# Patient Record
Sex: FEMALE | Race: WHITE | NOT HISPANIC OR LATINO | Employment: OTHER | URBAN - METROPOLITAN AREA
[De-identification: names, ages, dates, MRNs, and addresses within clinical notes are randomized per-mention and may not be internally consistent; named-entity substitution may affect disease eponyms.]

---

## 2017-03-17 ENCOUNTER — CONSULT (OUTPATIENT)
Dept: SLEEP MEDICINE | Facility: HOSPITAL | Age: 57
End: 2017-03-17

## 2017-03-17 VITALS
SYSTOLIC BLOOD PRESSURE: 148 MMHG | HEART RATE: 79 BPM | WEIGHT: 260 LBS | OXYGEN SATURATION: 94 % | DIASTOLIC BLOOD PRESSURE: 78 MMHG | BODY MASS INDEX: 46.07 KG/M2 | HEIGHT: 63 IN

## 2017-03-17 DIAGNOSIS — G47.33 OBSTRUCTIVE SLEEP APNEA, ADULT: ICD-10-CM

## 2017-03-17 DIAGNOSIS — R06.83 SNORING: Primary | ICD-10-CM

## 2017-03-17 DIAGNOSIS — G25.81 RESTLESS LEGS SYNDROME (RLS): ICD-10-CM

## 2017-03-17 PROBLEM — E66.01 MORBID OBESITY DUE TO EXCESS CALORIES: Status: ACTIVE | Noted: 2017-03-17

## 2017-03-17 PROCEDURE — 99244 OFF/OP CNSLTJ NEW/EST MOD 40: CPT | Performed by: INTERNAL MEDICINE

## 2017-03-17 RX ORDER — LEVOCETIRIZINE DIHYDROCHLORIDE 5 MG/1
5 TABLET, FILM COATED ORAL AS NEEDED
COMMUNITY
Start: 2017-02-15

## 2017-03-17 RX ORDER — METOPROLOL SUCCINATE 50 MG/1
50 TABLET, EXTENDED RELEASE ORAL DAILY
COMMUNITY
Start: 2017-02-15

## 2017-03-17 RX ORDER — SULFAMETHOXAZOLE AND TRIMETHOPRIM 800; 160 MG/1; MG/1
TABLET ORAL
Refills: 0 | COMMUNITY
Start: 2017-02-23 | End: 2017-04-25

## 2017-03-17 RX ORDER — GABAPENTIN 100 MG/1
CAPSULE ORAL
Refills: 0 | COMMUNITY
Start: 2016-12-31 | End: 2017-04-25 | Stop reason: DRUGHIGH

## 2017-03-17 RX ORDER — GABAPENTIN 300 MG/1
CAPSULE ORAL
Refills: 0 | COMMUNITY
Start: 2017-01-17 | End: 2017-04-25 | Stop reason: SINTOL

## 2017-03-17 RX ORDER — CELECOXIB 200 MG/1
CAPSULE ORAL
Refills: 0 | COMMUNITY
Start: 2017-02-16 | End: 2019-01-31

## 2017-03-17 RX ORDER — FLUOXETINE HYDROCHLORIDE 20 MG/1
CAPSULE ORAL
COMMUNITY
Start: 2017-02-15 | End: 2017-04-25 | Stop reason: DRUGHIGH

## 2017-03-17 RX ORDER — ACYCLOVIR 800 MG/1
TABLET ORAL
Refills: 0 | COMMUNITY
Start: 2017-02-08 | End: 2017-04-25

## 2017-03-17 RX ORDER — PROMETHAZINE HYDROCHLORIDE 12.5 MG/1
TABLET ORAL
Refills: 0 | Status: ON HOLD | COMMUNITY
Start: 2017-02-16 | End: 2020-02-19

## 2017-03-17 NOTE — PATIENT INSTRUCTIONS
Restless Legs Syndrome  Restless legs syndrome is a condition that causes uncomfortable feelings or sensations in the legs, especially while sitting or lying down. The sensations usually cause an overwhelming urge to move the legs. The arms can also sometimes be affected.  The condition can range from mild to severe. The symptoms often interfere with a person's ability to sleep.  CAUSES  The cause of this condition is not known.  RISK FACTORS  This condition is more likely to develop in:  · People who are older than age 50.  · Pregnant women. In general, restless legs syndrome is more common in women than in men.  · People who have a family history of the condition.  · People who have certain medical conditions, such as iron deficiency, kidney disease, Parkinson disease, or nerve damage.  · People who take certain medicines, such as medicines for high blood pressure, nausea, colds, allergies, depression, and some heart conditions.  SYMPTOMS  The main symptom of this condition is uncomfortable sensations in the legs. These sensations may be:  · Described as pulling, tingling, prickling, throbbing, crawling, or burning.  · Worse while you are sitting or lying down.  · Worse during periods of rest or inactivity.  · Worse at night, often interfering with your sleep.  · Accompanied by a very strong urge to move your legs.  · Temporarily relieved by movement of your legs.  The sensations usually affect both sides of the body. The arms can also be affected, but this is rare. People who have this condition often have tiredness during the day because of their lack of sleep at night.  DIAGNOSIS  This condition may be diagnosed based on your description of the symptoms. You may also have tests, including blood tests, to check for other conditions that may lead to your symptoms. In some cases, you may be asked to spend some time in a sleep lab so your sleeping can be monitored.  TREATMENT  Treatment for this condition is  focused on managing the symptoms. Treatment may include:  · Self-help and lifestyle changes.  · Medicines.  HOME CARE INSTRUCTIONS  · Take medicines only as directed by your health care provider.  · Try these methods to get temporary relief from the uncomfortable sensations:    Massage your legs.    Walk or stretch.    Take a cold or hot bath.  · Practice good sleep habits. For example, go to bed and get up at the same time every day.  · Exercise regularly.  · Practice ways of relaxing, such as yoga or meditation.  · Avoid caffeine and alcohol.  · Do not use any tobacco products, including cigarettes, chewing tobacco, or electronic cigarettes. If you need help quitting, ask your health care provider.  · Keep all follow-up visits as directed by your health care provider. This is important.  SEEK MEDICAL CARE IF:  Your symptoms do not improve with treatment, or they get worse.     This information is not intended to replace advice given to you by your health care provider. Make sure you discuss any questions you have with your health care provider.     Document Released: 12/08/2003 Document Revised: 05/03/2016 Document Reviewed: 12/14/2015  [employer] Interactive Patient Education ©2016 [employer]  Sleep Apnea   Sleep apnea is a sleep disorder characterized by abnormal pauses in breathing while you sleep. When your breathing pauses, the level of oxygen in your blood decreases. This causes you to move out of deep sleep and into light sleep. As a result, your quality of sleep is poor, and the system that carries your blood throughout your body (cardiovascular system) experiences stress. If sleep apnea remains untreated, the following conditions can develop:  · High blood pressure (hypertension).  · Coronary artery disease.  · Inability to achieve or maintain an erection (impotence).  · Impairment of your thought process (cognitive dysfunction).  There are three types of sleep apnea:  1. Obstructive sleep apnea--Pauses  in breathing during sleep because of a blocked airway.  2. Central sleep apnea--Pauses in breathing during sleep because the area of the brain that controls your breathing does not send the correct signals to the muscles that control breathing.  3. Mixed sleep apnea--A combination of both obstructive and central sleep apnea.  RISK FACTORS  The following risk factors can increase your risk of developing sleep apnea:  · Being overweight.  · Smoking.  · Having narrow passages in your nose and throat.  · Being of older age.  · Being male.  · Alcohol use.  · Sedative and tranquilizer use.  · Ethnicity. Among individuals younger than 35 years,  Americans are at increased risk of sleep apnea.  SYMPTOMS   · Difficulty staying asleep.  · Daytime sleepiness and fatigue.  · Loss of energy.  · Irritability.  · Loud, heavy snoring.  · Morning headaches.  · Trouble concentrating.  · Forgetfulness.  · Decreased interest in sex.  · Unexplained sleepiness.  DIAGNOSIS   In order to diagnose sleep apnea, your caregiver will perform a physical examination. A sleep study done in the comfort of your own home may be appropriate if you are otherwise healthy. Your caregiver may also recommend that you spend the night in a sleep lab. In the sleep lab, several monitors record information about your heart, lungs, and brain while you sleep. Your leg and arm movements and blood oxygen level are also recorded.  TREATMENT  The following actions may help to resolve mild sleep apnea:  · Sleeping on your side.    · Using a decongestant if you have nasal congestion.    · Avoiding the use of depressants, including alcohol, sedatives, and narcotics.    · Losing weight and modifying your diet if you are overweight.  There also are devices and treatments to help open your airway:  · Oral appliances. These are custom-made mouthpieces that shift your lower jaw forward and slightly open your bite. This opens your airway.  · Devices that create  "positive airway pressure. This positive pressure \"splints\" your airway open to help you breathe better during sleep. The following devices create positive airway pressure:    Continuous positive airway pressure (CPAP) device. The CPAP device creates a continuous level of air pressure with an air pump. The air is delivered to your airway through a mask while you sleep. This continuous pressure keeps your airway open.    Nasal expiratory positive airway pressure (EPAP) device. The EPAP device creates positive air pressure as you exhale. The device consists of single-use valves, which are inserted into each nostril and held in place by adhesive. The valves create very little resistance when you inhale but create much more resistance when you exhale. That increased resistance creates the positive airway pressure. This positive pressure while you exhale keeps your airway open, making it easier to breath when you inhale again.    Bilevel positive airway pressure (BPAP) device. The BPAP device is used mainly in patients with central sleep apnea. This device is similar to the CPAP device because it also uses an air pump to deliver continuous air pressure through a mask. However, with the BPAP machine, the pressure is set at two different levels. The pressure when you exhale is lower than the pressure when you inhale.  · Surgery. Typically, surgery is only done if you cannot comply with less invasive treatments or if the less invasive treatments do not improve your condition. Surgery involves removing excess tissue in your airway to create a wider passage way.     This information is not intended to replace advice given to you by your health care provider. Make sure you discuss any questions you have with your health care provider.     Document Released: 12/08/2003 Document Revised: 01/08/2016 Document Reviewed: 09/26/2016  [employer] Interactive Patient Education ©2016 [employer]    "

## 2017-03-18 NOTE — PROGRESS NOTES
Subjective   Jeevan Cardoso is a 56 y.o. female is being seen for consultation today at the request of SHARATH Sampsonhe is referred for snoring and nonrestorative sleep.    History of Present Illness  Patient states she had surgery last year and the anesthesiologist remarked that she needed to have a sleep study.  She's been told she had snoring.  All of her life but it's been worse in the past 5 years.  Apparently her  moved to sleep in a different room a urine a half ago due to her snoring as well as her movements.  Her  is noted her to have apneas for at least 2 years.  She admits to occasional falling asleep if quiet during the day.  She denies any problems while driving.  She does state she has loud snoring snores in all positions is awakened with a dry mouth and awakens with headaches 2-3 days a week.  She does not feel rested in the morning.  She denies ever breaking her nose.    She has history of reflux symptoms and is on medications.  She's had occasional hypnagogic hallucinations for roughly year.  She denies any sleep paralysis.  She denies any cataplexy.  She has a lot of leg movements at night and is been diagnosed with restless leg syndrome.she is already on medications for this and says it seems to help quite a bit.  She has some pain from her wrist that bothers her at night.  She admits she's gained 15 pounds in the past year.    She goes to bed between 9 and 11 PM.  She says it takes her an hour to go to sleep.  She will wakens couple times during the night.  She thinks she gets 4-5 hours of sleep and often feels tired on arising.  She has a history of hypertension for 4 years.  She denies any history of diabetes or coronary artery disease.  She has a history of arthritis gastroesophageal reflux disease and vertigo and migraines.    Past medical history is: Surgeries: She's had wrist surgeries ×2.  She had cholecystectomy.  She had wisdom teeth extraction she's had  "laparoscopy.    Allergies:She says she is allergic to most pain medicines as well as penicillin also allergic to tree pollen    Habits: Smoking: Without  Ethanol: She has one drink per week    Caffeine: She has 2 cups coffee per day    Family history: Positive for hypertension stroke COPD and cancer  The following portions of the patient's history were reviewed and updated as appropriate: allergies, current medications, past family history, past medical history, past social history, past surgical history and problem list.    Review of Systems   Constitutional: Positive for fatigue.   HENT: Positive for hearing loss, nosebleeds, postnasal drip, sinus pressure and tinnitus.    Eyes: Negative.    Respiratory: Negative.    Cardiovascular: Positive for palpitations and leg swelling.   Gastrointestinal: Positive for diarrhea.        She complains of difficulty swallowing   Genitourinary: Positive for frequency.   Musculoskeletal: Positive for arthralgias, back pain, gait problem and joint swelling.   Skin: Positive for rash.   Allergic/Immunologic: Positive for environmental allergies.   Neurological: Positive for dizziness, tremors, light-headedness, numbness and headaches.   Hematological: Negative.    Psychiatric/Behavioral: Positive for confusion, decreased concentration and dysphoric mood. The patient is nervous/anxious.    [place] scores 9/24    Objective    Visit Vitals   • /78   • Pulse 79   • Ht 63\" (160 cm)   • Wt 260 lb (118 kg)   • LMP  (LMP Unknown)   • SpO2 94%   • BMI 46.06 kg/m2       Physical Exam   Constitutional: She is oriented to person, place, and time. She appears well-developed and well-nourished.   She is morbidly obese.   HENT:   Head: Normocephalic and atraumatic.   She has nasal airway narrowing and Mallampati class IV anatomy   Eyes: EOM are normal. Pupils are equal, round, and reactive to light.   Neck: Normal range of motion. Neck supple.   Cardiovascular: Normal rate, regular rhythm " and normal heart sounds.    Pulmonary/Chest: Effort normal and breath sounds normal.   Abdominal: Soft. Bowel sounds are normal.   Musculoskeletal: Normal range of motion. She exhibits no edema.   Neurological: She is alert and oriented to person, place, and time.   Skin: Skin is warm and dry.   Psychiatric: She has a normal mood and affect. Her behavior is normal.         Assessment/Plan   Jeevan was seen today for sleeping problem.    Diagnoses and all orders for this visit:    Snoring  -     Home Sleep Study; Future    Restless legs syndrome (RLS)    Obstructive sleep apnea, adult  -     Home Sleep Study; Future    patient presents with a history as noted above.  She has a history of restless leg syndrome and is under therapy and seems have fairly good control.  She has a history of snoring and nonrestorative sleep and seems have obstructive sleep apnea.  We will plan to proceed to home sleep testing.  I've discussed possible therapies including CPAP weight loss oral appliances and surgery.  She is to return in roughly 2 weeks after the study.  I've also discussed possible complications from long-term untreated obstructive sleep apnea.    William Herron MD [facility]  Sleep Medicine  Pulmonary and Critical Care Medicine

## 2017-04-10 ENCOUNTER — PROCEDURE VISIT (OUTPATIENT)
Dept: NEUROLOGY | Facility: CLINIC | Age: 57
End: 2017-04-10

## 2017-04-10 DIAGNOSIS — G43.709 CHRONIC MIGRAINE W/O AURA W/O STATUS MIGRAINOSUS, NOT INTRACTABLE: Primary | ICD-10-CM

## 2017-04-10 PROCEDURE — 64615 CHEMODENERV MUSC MIGRAINE: CPT | Performed by: PSYCHIATRY & NEUROLOGY

## 2017-04-10 NOTE — PROGRESS NOTES
Botulinum Toxin Injection Procedure    Pre-operative diagnosis: headache    Post-operative diagnosis: Same    Procedure: Chemical neurolysis    After risks and benefits were explained including bleeding, infection, worsening of pain, damage to the areas being injected, weakness of muscles, loss of muscle control, dysphagia if injecting the head or neck, facial droop if injecting the facial area, painful injection, allergic or other reaction to the medications being injected, and the failure of the procedure to help the problem, a signed consent was obtained.      The patient was placed in a comfortable area and the sites to be treated were identified. A time out was called and performed. The area to be treated was prepped three times with alcohol and the alcohol allowed to dry. Next, a 27 gauge needle was used to inject the medication in the area to be treated.    Area(s) injected: frontalis muscle, semispinalis capitis muscle and temporallis muscle    Medications used: botulinum toxin 200 mu, 2 mL of saline used to dilute the botulinum toxin.      The patient did tolerate the procedure well. There were no complications.       Physical Examination    Current Treatment (Units)   Occipitalis 80 units on the right    Temporalis 75 units on the right   EMG Guidance none .   Complications: none .   The total amount injected in units is 155 .   The total amount wasted in units is 45 .   The total amount submitted in units is 200 .   Botox was supplied by physician.

## 2017-04-21 ENCOUNTER — TRANSCRIBE ORDERS (OUTPATIENT)
Dept: ADMINISTRATIVE | Facility: HOSPITAL | Age: 57
End: 2017-04-21

## 2017-04-21 DIAGNOSIS — R41.3 MEMORY LOSS: Primary | ICD-10-CM

## 2017-04-24 ENCOUNTER — HOSPITAL ENCOUNTER (OUTPATIENT)
Dept: SLEEP MEDICINE | Facility: HOSPITAL | Age: 57
Discharge: HOME | End: 2017-04-24
Attending: INTERNAL MEDICINE | Admitting: INTERNAL MEDICINE

## 2017-04-24 VITALS
HEART RATE: 66 BPM | OXYGEN SATURATION: 96 % | SYSTOLIC BLOOD PRESSURE: 96 MMHG | BODY MASS INDEX: 46.74 KG/M2 | WEIGHT: 263.8 LBS | HEIGHT: 63 IN | DIASTOLIC BLOOD PRESSURE: 51 MMHG

## 2017-04-24 DIAGNOSIS — R06.83 SNORING: ICD-10-CM

## 2017-04-24 DIAGNOSIS — G47.33 OBSTRUCTIVE SLEEP APNEA, ADULT: ICD-10-CM

## 2017-04-24 PROCEDURE — G0398 HOME SLEEP TEST/TYPE 2 PORTA: HCPCS

## 2017-04-24 PROCEDURE — 95806 SLEEP STUDY UNATT&RESP EFFT: CPT | Performed by: INTERNAL MEDICINE

## 2017-04-25 DIAGNOSIS — I10 ESSENTIAL HYPERTENSION: ICD-10-CM

## 2017-04-25 DIAGNOSIS — G25.81 RESTLESS LEGS SYNDROME (RLS): ICD-10-CM

## 2017-04-25 DIAGNOSIS — G47.33 OBSTRUCTIVE SLEEP APNEA, ADULT: Primary | ICD-10-CM

## 2017-04-25 DIAGNOSIS — R06.83 SNORING: ICD-10-CM

## 2017-04-27 ENCOUNTER — HOSPITAL ENCOUNTER (OUTPATIENT)
Dept: CT IMAGING | Facility: HOSPITAL | Age: 57
Discharge: HOME | End: 2017-04-27
Attending: FAMILY MEDICINE | Admitting: FAMILY MEDICINE

## 2017-04-27 DIAGNOSIS — R41.3 MEMORY LOSS: ICD-10-CM

## 2017-04-27 PROCEDURE — 70450 CT HEAD/BRAIN W/O DYE: CPT

## 2017-04-28 NOTE — PROGRESS NOTES
04- Patient agrees to start treatment. Filled of the DME preference sheet and has a preference of [facility]. Sending information over.

## 2017-07-14 ENCOUNTER — PROCEDURE VISIT (OUTPATIENT)
Dept: NEUROLOGY | Facility: CLINIC | Age: 57
End: 2017-07-14

## 2017-07-14 DIAGNOSIS — G43.709 CHRONIC MIGRAINE W/O AURA W/O STATUS MIGRAINOSUS, NOT INTRACTABLE: Primary | ICD-10-CM

## 2017-07-14 PROCEDURE — 64615 CHEMODENERV MUSC MIGRAINE: CPT | Performed by: PSYCHIATRY & NEUROLOGY

## 2017-07-14 NOTE — PROGRESS NOTES
Botulinum Toxin Injection Procedure    History:  Response to treatment has reduced HA's at least 7 fewer days a month and/or 100 hours fewer each month.     Pre-operative diagnosis: headache    Post-operative diagnosis: Same    Procedure: Chemical neurolysis    After risks and benefits were explained including bleeding, infection, worsening of pain, damage to the areas being injected, weakness of muscles, loss of muscle control, dysphagia if injecting the head or neck, facial droop if injecting the facial area, painful injection, allergic or other reaction to the medications being injected, and the failure of the procedure to help the problem, a signed consent was obtained.      The patient was placed in a comfortable area and the sites to be treated were identified. A time out was called and performed. The area to be treated was prepped three times with alcohol and the alcohol allowed to dry. Next, a 27 gauge needle was used to inject the medication in the area to be treated.    Area(s) injected: frontalis muscle, semispinalis capitis muscle and temporallis muscle    Medications used: botulinum toxin 200 mu, 2 mL of saline used to dilute the botulinum toxin.      The patient did tolerate the procedure well. There were no complications.       Physical Examination    Current Treatment (Units)   Splenius Capitis 25 units on the right and 25 units on the left.   Temporalis 25 units on the right and 25 units on the left.   Frontalis 27 units on the right and 28 units on the left.   EMG Guidance none .   Complications: none .   The total amount injected in units is 155 .   The total amount wasted in units is 45 .   The total amount submitted in units is 200 .   Botox was supplied by physician.

## 2017-07-28 ENCOUNTER — OFFICE VISIT (OUTPATIENT)
Dept: SLEEP MEDICINE | Facility: HOSPITAL | Age: 57
End: 2017-07-28

## 2017-07-28 VITALS
BODY MASS INDEX: 47.66 KG/M2 | SYSTOLIC BLOOD PRESSURE: 126 MMHG | OXYGEN SATURATION: 93 % | HEART RATE: 80 BPM | DIASTOLIC BLOOD PRESSURE: 68 MMHG | HEIGHT: 63 IN | WEIGHT: 269 LBS

## 2017-07-28 DIAGNOSIS — G47.34 NOCTURNAL HYPOXEMIA: ICD-10-CM

## 2017-07-28 DIAGNOSIS — G47.33 OBSTRUCTIVE SLEEP APNEA, ADULT: Primary | ICD-10-CM

## 2017-07-28 PROCEDURE — 99214 OFFICE O/P EST MOD 30 MIN: CPT | Performed by: INTERNAL MEDICINE

## 2017-07-28 RX ORDER — FUROSEMIDE 20 MG/1
20 TABLET ORAL AS NEEDED
Refills: 0 | COMMUNITY
Start: 2017-07-06

## 2017-07-28 NOTE — PROGRESS NOTES
Subjective   Jeevan Cardoso is a 56 y.o. female is here today for follow-up.  She is followed here with snoring and nonrestorative sleep.  Her primary care physician is Dr. Hair Delcid.    History of Present Illness  Patient was seen here March 17 with a history of snoring and nonrestorative sleep as well as hypertension arthritis and morbid obesity.  She had home sleep testing on April 24.  She thought her study night was somewhat better than usual.  She is been on CPAP since.  She says she has occasional problem with the mask and takes her some time to get comfortable.  She had some problems with allergies with her old cushion and now is using a new one doing better.  She feels more rested when she uses her machine.  Past Medical History:   Diagnosis Date   • Arthritis    • Erosive (osteo)arthritis    • GERD (gastroesophageal reflux disease)    • Migraine    • Vertigo        Past Surgical History:   Procedure Laterality Date   • GALLBLADDER SURGERY     • WISDOM TOOTH EXTRACTION             Current Outpatient Prescriptions:   •  ALPRAZolam (XANAX) 0.5 MG tablet, Take  by mouth., Disp: , Rfl:   •  buPROPion XL (WELLBUTRIN XL) 300 MG 24 hr tablet, Take  by mouth daily., Disp: , Rfl:   •  celecoxib (CeleBREX) 200 MG capsule, take 1 capsule by mouth once daily, Disp: , Rfl: 0  •  eletriptan (RELPAX) 40 MG tablet, Take 40 mg by mouth. may repeat in 2 hours if necessary, Disp: , Rfl:   •  FLUoxetine (PROZAC) 40 MG capsule, Take  by mouth daily., Disp: , Rfl:   •  furosemide (LASIX) 20 MG tablet, take 1 tablet by mouth once daily if needed, Disp: , Rfl: 0  •  levocetirizine (XYZAL) 5 MG tablet, , Disp: , Rfl:   •  metoprolol succinate XL (TOPROL-XL) 50 MG 24 hr tablet, , Disp: , Rfl:   •  OnabotulinumtoxinA (BOTOX) 200 UNITS reconstituted solution, Inject 200 Units into the shoulder, thigh, or buttocks 1 (one) time., Disp: , Rfl:   •  pantoprazole (PROTONIX) 40 MG EC tablet, Take  by mouth daily., Disp: , Rfl:   •   "promethazine (PHENERGAN) 25 MG tablet, take 1 tablet by mouth every 4 to 6 hours if needed, Disp: , Rfl: 0  •  tiZANidine (ZANAFLEX) 4 MG tablet, Take 3 tablets by mouth daily., Disp: , Rfl:   •  traMADol (ULTRAM) 50 MG tablet, Take 50 mg by mouth., Disp: , Rfl:     Current Facility-Administered Medications:   •  OnabotulinumtoxinA 155 Units, 155 Units, Intramuscular, Q3 Months, Carl Conway MD, 155 Units at 07/14/17 1334    Allergies   Allergen Reactions   • Penicillins Anaphylaxis   • Cefaclor Itching   • Hydrocodone Itching   • Oxycodone-Acetaminophen Itching   • Vancomycin Itching       The following portions of the patient's history were reviewed and updated as appropriate: allergies, current medications and problem list.    Review of Systems   Constitutional: Negative.  Negative for fever.   HENT: Positive for congestion, postnasal drip, rhinorrhea and sinus pressure. Negative for ear pain and sore throat.    Eyes: Negative.    Respiratory: Positive for cough and shortness of breath. Negative for wheezing.    Cardiovascular: Positive for leg swelling. Negative for chest pain.   Gastrointestinal: Positive for abdominal distention. Negative for abdominal pain and vomiting.   Endocrine: Negative.    Genitourinary: Negative.    Musculoskeletal: Positive for arthralgias and joint swelling. Negative for neck pain.   Skin: Negative.  Negative for rash.   Allergic/Immunologic: Positive for environmental allergies.   Neurological: Positive for headaches.   Hematological: Negative.    Psychiatric/Behavioral: Positive for dysphoric mood. The patient is nervous/anxious.    [place] score 7/24    Objective     /68  Pulse 80  Ht 63\" (160 cm)  Wt 269 lb (122 kg)  LMP  (LMP Unknown)  SpO2 93%  BMI 47.65 kg/m2    Physical Exam   Constitutional: She is oriented to person, place, and time. She appears well-developed and well-nourished.   She has morbid obesity.   HENT:   Head: Normocephalic and atraumatic.   She " has nasal airway narrowing with nasal septal deviation the left and Mallampati class IV anatomy   Eyes: EOM are normal. Pupils are equal, round, and reactive to light.   Neck: Normal range of motion. Neck supple.   Cardiovascular: Normal rate, regular rhythm and normal heart sounds.    Pulmonary/Chest: Effort normal and breath sounds normal.   Abdominal: Soft. Bowel sounds are normal.   Musculoskeletal: Normal range of motion. She exhibits edema.   She has 1+ pedal edema   Neurological: She is alert and oriented to person, place, and time.   Skin: Skin is warm and dry.   Psychiatric: She has a normal mood and affect. Her behavior is normal.    she had home sleep testing on April 24 that showed severe obstructive sleep apnea with an AHI of 109.4 she had oxygen desaturations to 79% and spent 8.2% of her time in bed in desaturated state.    Download from her machine for the past 80 days shows usage 95% the time.  She is using it for hours 45 minutes per day.  Her 90% pressure is 13.8.  Her AHI is minimally elevated at 5.3.      Assessment/Plan   Jeevan was seen today for follow-up.    Diagnoses and all orders for this visit:    Obstructive sleep apnea, adult  -     CPAP Therapy  -     Overnight Sleep Oximetry Study; Future    Nocturnal hypoxemia  -     Overnight Sleep Oximetry Study; Future    Patient does have very severe obstructive sleep apnea with significant nocturnal hypoxemia.  She has fairly good control of her respiratory events on her current pressure settings.  She wishes to continue on the CPAP.  We've discussed other possible therapies including weight loss oral appliances and surgery.  We will write to renew her supplies.  We will check an overnight oximetry on the CPAP to be certain we are correcting nocturnal hypoxemia.  Assuming it is adequate we will plan to see her back in 1 year.  She is encouraged to lose weight.  She is encouraged to avoid alcohol and sedatives close to bedtime.  She is encouraged  to act us lateral position sleep.  She is to contact us earlier symptoms worsen.             William Herron MD [facility]  Sleep Medicine  Pulmonary and Critical Care Medicine      07/28/17  2:19 PM

## 2017-07-28 NOTE — PATIENT INSTRUCTIONS
Sleep Apnea  Sleep apnea is a condition in which breathing pauses or becomes shallow during sleep. Episodes of sleep apnea usually last 10 seconds or longer, and they may occur as many as 20 times an hour. Sleep apnea disrupts your sleep and keeps your body from getting the rest that it needs. This condition can increase your risk of certain health problems, including:  · Heart attack.  · Stroke.  · Obesity.  · Diabetes.  · Heart failure.  · Irregular heartbeat.  There are three kinds of sleep apnea:  · Obstructive sleep apnea. This kind is caused by a blocked or collapsed airway.  · Central sleep apnea. This kind happens when the part of the brain that controls breathing does not send the correct signals to the muscles that control breathing.  · Mixed sleep apnea. This is a combination of obstructive and central sleep apnea.  CAUSES  The most common cause of this condition is a collapsed or blocked airway. An airway can collapse or become blocked if:  · Your throat muscles are abnormally relaxed.  · Your tongue and tonsils are larger than normal.  · You are overweight.  · Your airway is smaller than normal.  RISK FACTORS  This condition is more likely to develop in people who:  · Are overweight.  · Smoke.  · Have a smaller than normal airway.  · Are elderly.  · Are male.  · Drink alcohol.  · Take sedatives or tranquilizers.  · Have a family history of sleep apnea.  SYMPTOMS  Symptoms of this condition include:  · Trouble staying asleep.  · Daytime sleepiness and tiredness.  · Irritability.  · Loud snoring.  · Morning headaches.  · Trouble concentrating.  · Forgetfulness.  · Decreased interest in sex.  · Unexplained sleepiness.  · Mood swings.  · Personality changes.  · Feelings of depression.  · Waking up often during the night to urinate.  · Dry mouth.  · Sore throat.  DIAGNOSIS  This condition may be diagnosed with:  · A medical history.  · A physical exam.  · A series of tests that are done while you are  sleeping (sleep study). These tests are usually done in a sleep lab, but they may also be done at home.  TREATMENT  Treatment for this condition aims to restore normal breathing and to ease symptoms during sleep. It may involve managing health issues that can affect breathing, such as high blood pressure or obesity. Treatment may include:  · Sleeping on your side.  · Using a decongestant if you have nasal congestion.  · Avoiding the use of depressants, including alcohol, sedatives, and narcotics.  · Losing weight if you are overweight.  · Making changes to your diet.  · Quitting smoking.  · Using a device to open your airway while you sleep, such as:    An oral appliance. This is a custom-made mouthpiece that shifts your lower jaw forward.    A continuous positive airway pressure (CPAP) device. This device delivers oxygen to your airway through a mask.    A nasal expiratory positive airway pressure (EPAP) device. This device has valves that you put into each nostril.    A bi-level positive airway pressure (BPAP) device. This device delivers oxygen to your airway through a mask.  · Surgery if other treatments do not work. During surgery, excess tissue is removed to create a wider airway.  It is important to get treatment for sleep apnea. Without treatment, this condition can lead to:  · High blood pressure.  · Coronary artery disease.  · (Men) An inability to achieve or maintain an erection (impotence).  · Reduced thinking abilities.  HOME CARE INSTRUCTIONS  · Make any lifestyle changes that your health care provider recommends.  · Eat a healthy, well-balanced diet.  · Take over-the-counter and prescription medicines only as told by your health care provider.  · Avoid using depressants, including alcohol, sedatives, and narcotics.  · Take steps to lose weight if you are overweight.  · If you were given a device to open your airway while you sleep, use it only as told by your health care provider.  · Do not use any  tobacco products, such as cigarettes, chewing tobacco, and e-cigarettes. If you need help quitting, ask your health care provider.  · Keep all follow-up visits as told by your health care provider. This is important.  SEEK MEDICAL CARE IF:  · The device that you received to open your airway during sleep is uncomfortable or does not seem to be working.  · Your symptoms do not improve.  · Your symptoms get worse.  SEEK IMMEDIATE MEDICAL CARE IF:  · You develop chest pain.  · You develop shortness of breath.  · You develop discomfort in your back, arms, or stomach.  · You have trouble speaking.  · You have weakness on one side of your body.  · You have drooping in your face.  These symptoms may represent a serious problem that is an emergency. Do not wait to see if the symptoms will go away. Get medical help right away. Call your local emergency services (911 in the [place] Do not drive yourself to the hospital.     This information is not intended to replace advice given to you by your health care provider. Make sure you discuss any questions you have with your health care provider.     Document Released: 12/08/2003 Document Revised: 04/10/2017 Document Reviewed: 09/26/2016  [employer] Interactive Patient Education ©2017 [employer]

## 2017-09-08 ENCOUNTER — TRANSCRIBE ORDERS (OUTPATIENT)
Dept: ADMINISTRATIVE | Facility: HOSPITAL | Age: 57
End: 2017-09-08

## 2017-09-08 DIAGNOSIS — R60.0 LOWER LEG EDEMA: Primary | ICD-10-CM

## 2019-01-14 ENCOUNTER — LAB (OUTPATIENT)
Dept: LAB | Facility: HOSPITAL | Age: 59
End: 2019-01-14

## 2019-01-14 ENCOUNTER — CONSULT (OUTPATIENT)
Dept: ONCOLOGY | Facility: CLINIC | Age: 59
End: 2019-01-14

## 2019-01-14 VITALS
DIASTOLIC BLOOD PRESSURE: 76 MMHG | HEIGHT: 63 IN | HEART RATE: 88 BPM | SYSTOLIC BLOOD PRESSURE: 145 MMHG | WEIGHT: 248 LBS | RESPIRATION RATE: 18 BRPM | BODY MASS INDEX: 43.94 KG/M2 | TEMPERATURE: 98.2 F | OXYGEN SATURATION: 96 %

## 2019-01-14 DIAGNOSIS — D69.6 THROMBOCYTOPENIA (HCC): Primary | Chronic | ICD-10-CM

## 2019-01-14 DIAGNOSIS — D69.6 THROMBOCYTOPENIA (HCC): ICD-10-CM

## 2019-01-14 LAB
APTT PPP: 32.6 SECONDS (ref 24–37)
D DIMER PPP FEU-MCNC: 0.44 MCGFEU/ML (ref 0–0.56)
FIBRINOGEN PPP-MCNC: 358 MG/DL (ref 220–400)
FSP PPP LA-ACNC: NORMAL
INR PPP: 1.21 (ref 0.85–1.16)
PROTHROMBIN TIME: 14.7 SECONDS (ref 11.2–14.3)

## 2019-01-14 PROCEDURE — 85060 BLOOD SMEAR INTERPRETATION: CPT

## 2019-01-14 PROCEDURE — 85384 FIBRINOGEN ACTIVITY: CPT

## 2019-01-14 PROCEDURE — 86038 ANTINUCLEAR ANTIBODIES: CPT

## 2019-01-14 PROCEDURE — 85379 FIBRIN DEGRADATION QUANT: CPT

## 2019-01-14 PROCEDURE — 85730 THROMBOPLASTIN TIME PARTIAL: CPT

## 2019-01-14 PROCEDURE — 36415 COLL VENOUS BLD VENIPUNCTURE: CPT

## 2019-01-14 PROCEDURE — 85610 PROTHROMBIN TIME: CPT

## 2019-01-14 PROCEDURE — 86677 HELICOBACTER PYLORI ANTIBODY: CPT

## 2019-01-14 PROCEDURE — 85362 FIBRIN DEGRADATION PRODUCTS: CPT

## 2019-01-14 PROCEDURE — 86430 RHEUMATOID FACTOR TEST QUAL: CPT

## 2019-01-14 PROCEDURE — 99244 OFF/OP CNSLTJ NEW/EST MOD 40: CPT | Performed by: INTERNAL MEDICINE

## 2019-01-14 RX ORDER — VENLAFAXINE 75 MG/1
75 TABLET ORAL 2 TIMES DAILY
COMMUNITY
End: 2019-01-31

## 2019-01-14 RX ORDER — AMPICILLIN TRIHYDRATE 250 MG
400 CAPSULE ORAL DAILY
COMMUNITY

## 2019-01-14 RX ORDER — MELOXICAM 7.5 MG/1
7.5 TABLET ORAL DAILY
COMMUNITY
End: 2023-01-23

## 2019-01-14 RX ORDER — B-COMPLEX WITH VITAMIN C
1 TABLET ORAL EVERY MORNING
COMMUNITY
End: 2019-01-31

## 2019-01-14 RX ORDER — VENLAFAXINE HYDROCHLORIDE 37.5 MG/1
37.5 CAPSULE, EXTENDED RELEASE ORAL DAILY
COMMUNITY
End: 2019-01-31

## 2019-01-14 NOTE — PROGRESS NOTES
CHIEF COMPLAINT: Thrombocytopenia    REASON FOR REFERRAL: Same      RECORDS OBTAINED  Records of the patients history including those obtained from  [facility] were reviewed and summarized in detail.    Oncology/Hematology History    1. Thrombocytopenia  2. Severe obstructive sleep apnea  3. Normal creatinine and liver enzymes    -1/14/19: Saw me for the first time with platelet count by history of 76,000 in November 2018 and 83,000 in October 2018 with normal creatinine and liver enzymes.  No petechiae or ecchymoses.  No arthritis to suggest rheumatoid disease.  No rashes.  No diarrhea.  No abdominal distention she's been aware of.  No change in the color caliber consistency of her stools.  No consumption of undercooked beef that she is aware of.  No known ulcers.  I recommended checking a peripheral smear for platelet clumping and schistocytes, d-dimer, fibrin split products, PT, PTT, LINETTE, rheumatoid factor, and liver spleen ultrasound with her severe obstructive sleep apnea with high risk of pulmonary hypertension and possible functional portal hypertension with splenomegaly though I did not feel an enlarged spleen on exam.  Nonetheless she has large abdominal girth that would make that less accurate on exam.  She is had a 36 pound unexplained weight loss over the last 10 months with a great deal of fatigue and to that end for the potential of immune mediated destruction of platelets in the face of lymphoproliferative disorder I will check a CT of her chest abdomen and pelvis as well.  I will also check Helicobacter pylori see urology's and ultimately she may need to go back to Dr. Alvarez who did her colonoscopy 2 years ago and EGD about 7 years ago if there is any hint of ongoing reflux symptoms/helicobacter pylori        Thrombocytopenia (CMS/HCC)    1/14/2019 Initial Diagnosis     Thrombocytopenia (CMS/HCC)            HISTORY OF PRESENT ILLNESS:  The patient is a 58 y.o.  female, referred for thrombocytopenia.   See above for hematology history of present illness.    REVIEW OF SYSTEMS:  A 14 point review of systems was performed and is negative except as noted above.    Past Medical History:   Diagnosis Date   • Arthritis    • Erosive (osteo)arthritis    • GERD (gastroesophageal reflux disease)    • Migraine    • Vertigo      Past Surgical History:   Procedure Laterality Date   • GALLBLADDER SURGERY     • WISDOM TOOTH EXTRACTION         Current Outpatient Medications on File Prior to Visit   Medication Sig Dispense Refill   • ALPRAZolam (XANAX) 0.5 MG tablet Take 0.5 mg by mouth 3 (Three) Times a Day As Needed.     • Coenzyme Q10 (COQ10) 200 MG capsule Take 200 mg by mouth Daily.     • eletriptan (RELPAX) 40 MG tablet Take 40 mg by mouth. may repeat in 2 hours if necessary     • FLUoxetine (PROZAC) 40 MG capsule Take  by mouth daily.     • furosemide (LASIX) 20 MG tablet take 1 tablet by mouth once daily if needed  0   • levocetirizine (XYZAL) 5 MG tablet      • meloxicam (MOBIC) 7.5 MG tablet Take 7.5 mg by mouth Daily.     • metoprolol succinate XL (TOPROL-XL) 50 MG 24 hr tablet      • pantoprazole (PROTONIX) 40 MG EC tablet Take  by mouth daily.     • promethazine (PHENERGAN) 12.5 MG tablet take 1 tablet by mouth every 4 to 6 hours if needed  0   • Riboflavin (VITAMIN B-2) 25 MG tablet Take 1 tablet by mouth Every Morning.     • tiZANidine (ZANAFLEX) 4 MG tablet Take 3 tablets by mouth Every 8 (Eight) Hours As Needed (RLS).     • traMADol (ULTRAM) 50 MG tablet Take 50 mg by mouth Every 8 (Eight) Hours As Needed.     • Unable to find 1 each 1 (One) Time. Membrasm qam     • venlafaxine (EFFEXOR) 75 MG tablet Take 75 mg by mouth 2 (Two) Times a Day.     • Venlafaxine HCl (EFFEXOR XR PO) Take 225 mg by mouth Every Night.     • venlafaxine XR (EFFEXOR XR) 37.5 MG 24 hr capsule Take 37.5 mg by mouth Daily.     • buPROPion XL (WELLBUTRIN XL) 300 MG 24 hr tablet Take  by mouth daily.     • celecoxib (CeleBREX) 200 MG capsule  "take 1 capsule by mouth once daily  0   • OnabotulinumtoxinA (BOTOX) 200 UNITS reconstituted solution Inject 200 Units into the shoulder, thigh, or buttocks 1 (one) time.       Current Facility-Administered Medications on File Prior to Visit   Medication Dose Route Frequency Provider Last Rate Last Dose   • OnabotulinumtoxinA 155 Units  155 Units Intramuscular Q3 Months Carl Conway MD   155 Units at 07/14/17 1334       Allergies   Allergen Reactions   • Penicillins Anaphylaxis   • Cefaclor Itching   • Hydrocodone Itching   • Oxycodone-Acetaminophen Itching   • Vancomycin Itching       Social History     Socioeconomic History   • Marital status:      Spouse name: Not on file   • Number of children: Not on file   • Years of education: Not on file   • Highest education level: Not on file   Tobacco Use   • Smoking status: Never Smoker   • Smokeless tobacco: Never Used   Substance and Sexual Activity   • Alcohol use: Yes     Comment: once a week    • Drug use: No       Family History   Problem Relation Age of Onset   • Breast cancer Cousin 40   • Ovarian cancer Cousin 50   • Hypertension Mother    • Stroke Mother    • Cancer Father        PHYSICAL EXAM:    /76   Pulse 88   Temp 98.2 °F (36.8 °C)   Resp 18   Ht 160 cm (63\")   Wt 112 kg (248 lb)   LMP  (LMP Unknown)   SpO2 96%   BMI 43.93 kg/m²     ECOG: (1) Restricted in physically strenuous activity, ambulatory and able to do work of light nature  General: well appearing overly nourished female in no acute distress  HEENT: sclera anicteric, oropharynx clear  Lymphatics: no cervical, supraclavicular, inguinal, or axillary adenopathy  Cardiovascular: regular rate and rhythm, no murmurs  Neck: Supple; No thyromegaly  Lungs: clear to auscultation bilaterally. No respiratory distress.   Abdomen: soft, nontender, nondistended.  No palpable organomegaly  Extremities: no cyanosis, clubbing, edema, or cords  Skin: no rashes, lesions, bruising, or " petechiae  Neuro: Alert and oriented x 4; Moving all extremities.  Psych: No anxiety or depression    Lab Results   Component Value Date    HGB 12.4 10/07/2015    HCT 38.5 10/07/2015    MCV 98.0 10/07/2015     (L) 10/07/2015    WBC 4.53 10/07/2015    NEUTROABS 2.08 10/07/2015    LYMPHSABS 1.86 10/07/2015    MONOSABS 0.44 10/07/2015    EOSABS 0.14 10/07/2015    BASOSABS 0.01 10/07/2015     Lab Results   Component Value Date    GLUCOSE 114 (H) 10/07/2015    BUN 14 10/07/2015    CREATININE 1.0 10/07/2015     10/07/2015    K 4.3 10/07/2015     10/07/2015    CO2 27 10/07/2015    CALCIUM 8.4 (L) 10/07/2015    PROTEINTOT 6.1 10/07/2015    ALBUMIN 3.3 10/07/2015    BILITOT 0.2 (L) 10/07/2015    ALKPHOS 87 10/07/2015    AST 40 (H) 10/07/2015    ALT 32 10/07/2015           Assessment/Plan     1. Thrombocytopenia  2. Severe obstructive sleep apnea  3. Normal creatinine and liver enzymes    -1/14/19: Saw me for the first time with platelet count by history of 76,000 in November 2018 and 83,000 in October 2018 with normal creatinine and liver enzymes.  No petechiae or ecchymoses.  No arthritis to suggest rheumatoid disease.  No rashes.  No diarrhea.  No abdominal distention she's been aware of.  No change in the color caliber consistency of her stools.  No consumption of undercooked beef that she is aware of.  No known ulcers.  I recommended checking a peripheral smear for platelet clumping and schistocytes, d-dimer, fibrin split products, PT, PTT, LINETTE, rheumatoid factor, and liver spleen ultrasound with her severe obstructive sleep apnea with high risk of pulmonary hypertension and possible functional portal hypertension with splenomegaly though I did not feel an enlarged spleen on exam.  Nonetheless she has large abdominal girth that would make that less accurate on exam.  She is had a 36 pound unexplained weight loss over the last 10 months with a great deal of fatigue and to that end for the potential of  immune mediated destruction of platelets in the face of lymphoproliferative disorder I will check a CT of her chest abdomen and pelvis as well.  I will also check Helicobacter pylori see urology's and ultimately she may need to go back to Dr. Alvarez who did her colonoscopy 2 years ago and EGD about 7 years ago if there is any hint of ongoing reflux symptoms/helicobacter pylori.  Discussed with patient 1 hour greater than 50% spent counseling      Darren Day MD    1/14/2019

## 2019-01-15 LAB
H PYLORI IGA SER IA-ACNC: <9 UNITS (ref 0–8.9)
H PYLORI IGG SER IA-ACNC: 0.19 INDEX VALUE (ref 0–0.79)
H PYLORI IGM SER-ACNC: <9 UNITS (ref 0–8.9)
RHEUMATOID FACT SERPL-ACNC: NEGATIVE [IU]/ML

## 2019-01-16 LAB
ANA HOMOGEN TITR SER: ABNORMAL {TITER}
ANA SER QL IA: POSITIVE
CYTOLOGIST CVX/VAG CYTO: NORMAL
Lab: ABNORMAL
PATH INTERP BLD-IMP: NORMAL

## 2019-01-21 ENCOUNTER — HOSPITAL ENCOUNTER (OUTPATIENT)
Dept: CT IMAGING | Facility: HOSPITAL | Age: 59
Discharge: HOME | End: 2019-01-21
Attending: INTERNAL MEDICINE | Admitting: INTERNAL MEDICINE

## 2019-01-21 ENCOUNTER — TRANSCRIBE ORDERS (OUTPATIENT)
Dept: DIABETES SERVICES | Facility: HOSPITAL | Age: 59
End: 2019-01-21

## 2019-01-21 DIAGNOSIS — D69.6 THROMBOCYTOPENIA (HCC): Chronic | ICD-10-CM

## 2019-01-21 DIAGNOSIS — E11.9 NEW ONSET TYPE 2 DIABETES MELLITUS (HCC): Primary | ICD-10-CM

## 2019-01-21 LAB — CREAT BLDA-MCNC: 0.9 MG/DL (ref 0.6–1.3)

## 2019-01-21 PROCEDURE — 74177 CT ABD & PELVIS W/CONTRAST: CPT

## 2019-01-21 PROCEDURE — 82565 ASSAY OF CREATININE: CPT

## 2019-01-21 PROCEDURE — 71260 CT THORAX DX C+: CPT

## 2019-01-21 PROCEDURE — 25010000002 IOPAMIDOL 61 % SOLUTION: Performed by: INTERNAL MEDICINE

## 2019-01-21 RX ADMIN — BARIUM SULFATE 375 ML: 21 SUSPENSION ORAL at 15:02

## 2019-01-21 RX ADMIN — IOPAMIDOL 90 ML: 612 INJECTION, SOLUTION INTRAVENOUS at 15:00

## 2019-01-31 ENCOUNTER — LAB (OUTPATIENT)
Dept: LAB | Facility: HOSPITAL | Age: 59
End: 2019-01-31

## 2019-01-31 ENCOUNTER — TELEPHONE (OUTPATIENT)
Dept: ONCOLOGY | Facility: CLINIC | Age: 59
End: 2019-01-31

## 2019-01-31 ENCOUNTER — OFFICE VISIT (OUTPATIENT)
Dept: ONCOLOGY | Facility: CLINIC | Age: 59
End: 2019-01-31

## 2019-01-31 VITALS
RESPIRATION RATE: 18 BRPM | BODY MASS INDEX: 43.77 KG/M2 | HEIGHT: 63 IN | OXYGEN SATURATION: 97 % | DIASTOLIC BLOOD PRESSURE: 60 MMHG | TEMPERATURE: 97.8 F | HEART RATE: 65 BPM | WEIGHT: 247 LBS | SYSTOLIC BLOOD PRESSURE: 113 MMHG

## 2019-01-31 DIAGNOSIS — D69.6 THROMBOCYTOPENIA (HCC): Primary | Chronic | ICD-10-CM

## 2019-01-31 DIAGNOSIS — D69.6 THROMBOCYTOPENIA (HCC): ICD-10-CM

## 2019-01-31 LAB
ALBUMIN SERPL-MCNC: 3.37 G/DL (ref 3.2–4.8)
ALBUMIN/GLOB SERPL: 1 G/DL (ref 1.5–2.5)
ALP SERPL-CCNC: 119 U/L (ref 25–100)
ALT SERPL W P-5'-P-CCNC: 35 U/L (ref 7–40)
ANION GAP SERPL CALCULATED.3IONS-SCNC: 6 MMOL/L (ref 3–11)
AST SERPL-CCNC: 51 U/L (ref 0–33)
BILIRUB SERPL-MCNC: 1.2 MG/DL (ref 0.3–1.2)
BUN BLD-MCNC: 10 MG/DL (ref 9–23)
BUN/CREAT SERPL: 9.1 (ref 7–25)
CALCIUM SPEC-SCNC: 8.6 MG/DL (ref 8.7–10.4)
CHLORIDE SERPL-SCNC: 92 MMOL/L (ref 99–109)
CO2 SERPL-SCNC: 30 MMOL/L (ref 20–31)
CREAT BLD-MCNC: 1.1 MG/DL (ref 0.6–1.3)
ERYTHROCYTE [DISTWIDTH] IN BLOOD BY AUTOMATED COUNT: 13.8 % (ref 11.3–14.5)
GFR SERPL CREATININE-BSD FRML MDRD: 51 ML/MIN/1.73
GLOBULIN UR ELPH-MCNC: 3.3 GM/DL
GLUCOSE BLD-MCNC: 559 MG/DL (ref 70–100)
HCT VFR BLD AUTO: 40.5 % (ref 34.5–44)
HGB BLD-MCNC: 13.5 G/DL (ref 11.5–15.5)
LYMPHOCYTES # BLD AUTO: 1.4 10*3/MM3 (ref 0.6–4.8)
LYMPHOCYTES NFR BLD AUTO: 33.8 % (ref 24–44)
MCH RBC QN AUTO: 32.6 PG (ref 27–31)
MCHC RBC AUTO-ENTMCNC: 33.3 G/DL (ref 32–36)
MCV RBC AUTO: 97.9 FL (ref 80–99)
MONOCYTES # BLD AUTO: 0.1 10*3/MM3 (ref 0–1)
MONOCYTES NFR BLD AUTO: 2.9 % (ref 0–12)
NEUTROPHILS # BLD AUTO: 2.7 10*3/MM3 (ref 1.5–8.3)
NEUTROPHILS NFR BLD AUTO: 63.3 % (ref 41–71)
PLATELET # BLD AUTO: 87 10*3/MM3 (ref 150–450)
PMV BLD AUTO: 8.7 FL (ref 6–12)
POTASSIUM BLD-SCNC: 4.5 MMOL/L (ref 3.5–5.5)
PROT SERPL-MCNC: 6.7 G/DL (ref 5.7–8.2)
RBC # BLD AUTO: 4.14 10*6/MM3 (ref 3.89–5.14)
SODIUM BLD-SCNC: 128 MMOL/L (ref 132–146)
TSH SERPL DL<=0.05 MIU/L-ACNC: 8.68 MIU/ML (ref 0.35–5.35)
WBC NRBC COR # BLD: 4.2 10*3/MM3 (ref 3.5–10.8)

## 2019-01-31 PROCEDURE — 84443 ASSAY THYROID STIM HORMONE: CPT

## 2019-01-31 PROCEDURE — 99215 OFFICE O/P EST HI 40 MIN: CPT | Performed by: INTERNAL MEDICINE

## 2019-01-31 PROCEDURE — 85025 COMPLETE CBC W/AUTO DIFF WBC: CPT

## 2019-01-31 PROCEDURE — 82784 ASSAY IGA/IGD/IGG/IGM EACH: CPT

## 2019-01-31 PROCEDURE — 36415 COLL VENOUS BLD VENIPUNCTURE: CPT

## 2019-01-31 PROCEDURE — 80053 COMPREHEN METABOLIC PANEL: CPT

## 2019-01-31 PROCEDURE — 82785 ASSAY OF IGE: CPT

## 2019-01-31 RX ORDER — TIZANIDINE 4 MG/1
4 TABLET ORAL EVERY 6 HOURS PRN
COMMUNITY
Start: 2018-09-03 | End: 2023-01-23

## 2019-01-31 RX ORDER — TRAMADOL HYDROCHLORIDE 50 MG/1
TABLET ORAL
COMMUNITY
End: 2019-07-31

## 2019-01-31 RX ORDER — VENLAFAXINE HYDROCHLORIDE 150 MG/1
CAPSULE, EXTENDED RELEASE ORAL
COMMUNITY
End: 2019-07-31

## 2019-01-31 NOTE — PROGRESS NOTES
CHIEF COMPLAINT: Follow-up thrombocytopenia    Problem List:  Oncology/Hematology History    1. Thrombocytopenia  2. Severe obstructive sleep apnea  3. Normal creatinine and liver enzymes    -1/14/19: Saw me for the first time with platelet count by history of 76,000 in November 2018 and 83,000 in October 2018 with normal creatinine and liver enzymes.  No petechiae or ecchymoses.  No arthritis to suggest rheumatoid disease.  No rashes.  No diarrhea.  No abdominal distention she's been aware of.  No change in the color caliber consistency of her stools.  No consumption of undercooked beef that she is aware of.  No known ulcers.  I recommended checking a peripheral smear for platelet clumping and schistocytes, d-dimer, fibrin split products, PT, PTT, LINETTE, rheumatoid factor, and liver spleen ultrasound with her severe obstructive sleep apnea with high risk of pulmonary hypertension and possible functional portal hypertension with splenomegaly though I did not feel an enlarged spleen on exam.  Nonetheless she has large abdominal girth that would make that less accurate on exam.  She is had a 36 pound unexplained weight loss over the last 10 months with a great deal of fatigue and to that end for the potential of immune mediated destruction of platelets in the face of lymphoproliferative disorder I will check a CT of her chest abdomen and pelvis as well.  I will also check Helicobacter pylori see urology's and ultimately she may need to go back to Dr. Alvarez who did her colonoscopy 2 years ago and EGD about 7 years ago if there is any hint of ongoing reflux symptoms/helicobacter pylori    -1/31/19: Came back to me to go over the results of the above tests.  Liver spleen ultrasound showed portal vein dilated 1.5 cm with 14.7 cm splenomegaly and hepatomegaly with abnormal liver echogenicity indicative of parenchymal disease.  Serum helical back or pylori was negative.  1/21/19 CT chest abdomen pelvis showed 13 cm  splenomegaly with slight increased thickening of the spleen compared to prominent spleen seen in 2013 on prior CT.  Peripheral smear showed decreased platelets with no schistocytes or platelet clumping.  D-dimer, fibrin split products, and fibrinogen were all negative for DIC.  Her INR was 1.2 but with a normal PTT.  Her LINETTE was positive at 1:320 in a homogeneous pattern with rheumatoid factor negative.  Hence, I suspect this to be some component of portal hypertensive sequestration with hypersplenism and hepatomegaly on liver spleen ultrasound with Dr. Jonathan Florez as well as perhaps autoimmune thrombocytopenic purpura with a positive LINETTE and no peripheral stigmata for lupus.  I will make referrals to GI as well as rheumatology to sort out associated autoimmune and hepatic disorders.  I'm checking her liver enzymes along with her CBC and quantitative immunoglobulins as well as a TSH in light of her tendency to  infections as well as her severe fatigue 1/31/19 and will see her back in a couple of months to see what other specialists have come up with.        Thrombocytopenia (CMS/HCC)    1/14/2019 Initial Diagnosis     Thrombocytopenia (CMS/HCC)         1/21/2019 Imaging     CT chest, abdomen and pelvis IMPRESSION:  1. No acute intrathoracic pathology or evidence for active malignancy  within the chest.  2. Spleen is borderline enlarged in craniocaudal dimension at 13 cm with  increased thickness on transaxial measurements especially when compared  to 2013 CT evaluation concerning for splenomegaly with splenule at the  splenic hilum.  3. No bulky adenopathy within the abdomen or pelvis. No ascites.            HISTORY OF PRESENT ILLNESS:  The patient is a 58 y.o. female, here for follow up on management of thrombocytopenia.  she is very fatigued thus far inexplicably.  See above for details of my recent hematologic evaluation.      Past Medical History:   Diagnosis Date   • Arthritis    • Erosive  "(osteo)arthritis    • GERD (gastroesophageal reflux disease)    • Migraine    • Vertigo      Past Surgical History:   Procedure Laterality Date   • GALLBLADDER SURGERY     • WISDOM TOOTH EXTRACTION         Allergies   Allergen Reactions   • Penicillins Anaphylaxis   • Cefaclor Itching   • Hydrocodone Itching   • Oxycodone-Acetaminophen Itching   • Vancomycin Itching       Family History and Social History reviewed and changed as necessary      REVIEW OF SYSTEM:   Review of Systems   Constitutional: Negative for appetite change, chills, diaphoresis, fatigue, fever and unexpected weight change.   HENT:   Negative for mouth sores, sore throat and trouble swallowing.    Eyes: Negative for icterus.   Respiratory: Negative for cough, hemoptysis and shortness of breath.    Cardiovascular: Negative for chest pain, leg swelling and palpitations.   Gastrointestinal: Negative for abdominal distention, abdominal pain, blood in stool, constipation, diarrhea, nausea and vomiting.   Endocrine: Negative for hot flashes.   Genitourinary: Negative for bladder incontinence, difficulty urinating, dysuria, frequency and hematuria.    Musculoskeletal: Negative for gait problem, neck pain and neck stiffness.   Skin: Negative for rash.   Neurological: Negative for dizziness, gait problem, headaches, light-headedness and numbness.   Hematological: Negative for adenopathy. Does not bruise/bleed easily.   Psychiatric/Behavioral: Negative for depression. The patient is not nervous/anxious.    All other systems reviewed and are negative.       PHYSICAL EXAM    Vitals:    01/31/19 1027   BP: 113/60   Pulse: 65   Resp: 18   Temp: 97.8 °F (36.6 °C)   SpO2: 97%   Weight: 112 kg (247 lb)   Height: 160 cm (63\")     Constitutional: Appears well-developed and well-nourished. No distress.   ECOG: (1) Restricted in physically strenuous activity, ambulatory and able to do work of light nature  HENT:   Head: Normocephalic.   Mouth/Throat: Oropharynx is " clear and moist.   Eyes: Conjunctivae are normal. Pupils are equal, round, and reactive to light. No scleral icterus.   Neck: Neck supple. No JVD present. No thyromegaly present.   Cardiovascular: Normal rate, regular rhythm and normal heart sounds.    Pulmonary/Chest: Breath sounds normal. No respiratory distress.   Abdominal: Soft. Exhibits no distension and no mass. There is no hepatosplenomegaly. There is no tenderness. There is no rebound and no guarding.   Musculoskeletal:Exhibits no edema, tenderness or deformity.   Neurological: Alert and oriented to person, place, and time. Exhibits normal muscle tone.   Skin: No ecchymosis, no petechiae and no rash noted. Not diaphoretic. No cyanosis. Nails show no clubbing.   Psychiatric: Normal mood and affect.   Vitals reviewed.      Lab Results   Component Value Date    HGB 12.4 10/07/2015    HCT 38.5 10/07/2015    MCV 98.0 10/07/2015     (L) 10/07/2015    WBC 4.53 10/07/2015    NEUTROABS 2.08 10/07/2015    LYMPHSABS 1.86 10/07/2015    MONOSABS 0.44 10/07/2015    EOSABS 0.14 10/07/2015    BASOSABS 0.01 10/07/2015       Lab Results   Component Value Date    GLUCOSE 114 (H) 10/07/2015    BUN 14 10/07/2015    CREATININE 0.90 01/21/2019     10/07/2015    K 4.3 10/07/2015     10/07/2015    CO2 27 10/07/2015    CALCIUM 8.4 (L) 10/07/2015    PROTEINTOT 6.1 10/07/2015    ALBUMIN 3.3 10/07/2015    BILITOT 0.2 (L) 10/07/2015    ALKPHOS 87 10/07/2015    AST 40 (H) 10/07/2015    ALT 32 10/07/2015                   ASSESSMENT & PLAn  1. Thrombocytopenia  2. Severe obstructive sleep apnea  3. Normal creatinine and liver enzymes    -1/14/19: Saw me for the first time with platelet count by history of 76,000 in November 2018 and 83,000 in October 2018 with normal creatinine and liver enzymes.  No petechiae or ecchymoses.  No arthritis to suggest rheumatoid disease.  No rashes.  No diarrhea.  No abdominal distention she's been aware of.  No change in the color  caliber consistency of her stools.  No consumption of undercooked beef that she is aware of.  No known ulcers.  I recommended checking a peripheral smear for platelet clumping and schistocytes, d-dimer, fibrin split products, PT, PTT, LINETTE, rheumatoid factor, and liver spleen ultrasound with her severe obstructive sleep apnea with high risk of pulmonary hypertension and possible functional portal hypertension with splenomegaly though I did not feel an enlarged spleen on exam.  Nonetheless she has large abdominal girth that would make that less accurate on exam.  She is had a 36 pound unexplained weight loss over the last 10 months with a great deal of fatigue and to that end for the potential of immune mediated destruction of platelets in the face of lymphoproliferative disorder I will check a CT of her chest abdomen and pelvis as well.  I will also check Helicobacter pylori see urology's and ultimately she may need to go back to Dr. Alvarez who did her colonoscopy 2 years ago and EGD about 7 years ago if there is any hint of ongoing reflux symptoms/helicobacter pylori    -1/31/19: Came back to me to go over the results of the above tests.  Liver spleen ultrasound showed portal vein dilated 1.5 cm with 14.7 cm splenomegaly and hepatomegaly with abnormal liver echogenicity indicative of parenchymal disease.  Serum helical back or pylori was negative.  1/21/19 CT chest abdomen pelvis showed 13 cm splenomegaly with slight increased thickening of the spleen compared to prominent spleen seen in 2013 on prior CT.  Peripheral smear showed decreased platelets with no schistocytes or platelet clumping.  D-dimer, fibrin split products, and fibrinogen were all negative for DIC.  Her INR was 1.2 but with a normal PTT.  Her LINETTE was positive at 1:320 in a homogeneous pattern with rheumatoid factor negative.  Hence, I suspect this to be some component of portal hypertensive sequestration with hypersplenism and hepatomegaly on liver  spleen ultrasound with Dr. Jonathan Florez as well as perhaps autoimmune thrombocytopenic purpura with a positive LINETTE and no peripheral stigmata for lupus.  I will make referrals to GI as well as rheumatology to sort out associated autoimmune and hepatic disorders.  I'm checking her liver enzymes along with her CBC and quantitative immunoglobulins as well as a TSH in light of her tendency to  infections as well as her severe fatigue 1/31/19 and will see her back in a couple of months to see what other specialists have come up with.    Discussed with patient 45 minutes greater than 50% spent counseling  Darren Day MD    01/31/2019

## 2019-01-31 NOTE — TELEPHONE ENCOUNTER
Name of Physician notified: Darren Day MD    Time Physician Notified: 13:40       []  Orders received    []  Protocol/Standing orders followed    [x]  No new orders.  Dr. Day calling patient to discuss.  Results faxed to PCP.

## 2019-01-31 NOTE — TELEPHONE ENCOUNTER
----- Message from Cony Caruso RN sent at 1/31/2019  1:30 PM EST -----  Regarding: critical lab      Critical Test Results      MD: Dr. Day    Date:  1/31/19     Critical test result: Glucose 559    Time results received: 130 pm

## 2019-02-06 LAB
IGA SERPL-MCNC: 902 MG/DL (ref 87–352)
IGG SERPL-MCNC: 1451 MG/DL (ref 700–1600)
IGM SERPL-MCNC: 218 MG/DL (ref 26–217)
TOTAL IGE SMQN RAST: 96 IU/ML (ref 0–100)

## 2019-02-12 ENCOUNTER — LAB (OUTPATIENT)
Dept: LAB | Facility: HOSPITAL | Age: 59
End: 2019-02-12

## 2019-02-12 ENCOUNTER — OFFICE VISIT (OUTPATIENT)
Dept: GASTROENTEROLOGY | Facility: CLINIC | Age: 59
End: 2019-02-12

## 2019-02-12 VITALS
WEIGHT: 247 LBS | TEMPERATURE: 96.8 F | DIASTOLIC BLOOD PRESSURE: 82 MMHG | RESPIRATION RATE: 18 BRPM | SYSTOLIC BLOOD PRESSURE: 124 MMHG | BODY MASS INDEX: 43.75 KG/M2 | HEART RATE: 66 BPM | OXYGEN SATURATION: 99 %

## 2019-02-12 DIAGNOSIS — K74.60 CIRRHOSIS OF LIVER WITHOUT ASCITES, UNSPECIFIED HEPATIC CIRRHOSIS TYPE (HCC): Primary | ICD-10-CM

## 2019-02-12 DIAGNOSIS — K75.81 NASH (NONALCOHOLIC STEATOHEPATITIS): Primary | ICD-10-CM

## 2019-02-12 DIAGNOSIS — K74.60 CIRRHOSIS OF LIVER WITHOUT ASCITES, UNSPECIFIED HEPATIC CIRRHOSIS TYPE (HCC): ICD-10-CM

## 2019-02-12 LAB
ALBUMIN SERPL-MCNC: 3.46 G/DL (ref 3.2–4.8)
ALBUMIN/GLOB SERPL: 1.2 G/DL (ref 1.5–2.5)
ALP SERPL-CCNC: 104 U/L (ref 25–100)
ALT SERPL W P-5'-P-CCNC: 36 U/L (ref 7–40)
ANION GAP SERPL CALCULATED.3IONS-SCNC: 4 MMOL/L (ref 3–11)
AST SERPL-CCNC: 62 U/L (ref 0–33)
BILIRUB SERPL-MCNC: 0.6 MG/DL (ref 0.3–1.2)
BUN BLD-MCNC: 15 MG/DL (ref 9–23)
BUN/CREAT SERPL: 16.5 (ref 7–25)
CALCIUM SPEC-SCNC: 9.1 MG/DL (ref 8.7–10.4)
CHLORIDE SERPL-SCNC: 102 MMOL/L (ref 99–109)
CO2 SERPL-SCNC: 30 MMOL/L (ref 20–31)
CREAT BLD-MCNC: 0.91 MG/DL (ref 0.6–1.3)
DEPRECATED RDW RBC AUTO: 47.9 FL (ref 37–54)
ERYTHROCYTE [DISTWIDTH] IN BLOOD BY AUTOMATED COUNT: 13.2 % (ref 11.3–14.5)
GFR SERPL CREATININE-BSD FRML MDRD: 64 ML/MIN/1.73
GLOBULIN UR ELPH-MCNC: 2.9 GM/DL
GLUCOSE BLD-MCNC: 126 MG/DL (ref 70–100)
HBV CORE IGM SERPL QL IA: NORMAL
HBV SURFACE AB SER RIA-ACNC: REACTIVE
HBV SURFACE AG SERPL QL IA: NORMAL
HCT VFR BLD AUTO: 42.4 % (ref 34.5–44)
HCV AB SER DONR QL: NORMAL
HGB BLD-MCNC: 13.7 G/DL (ref 11.5–15.5)
INR PPP: 1.14 (ref 0.85–1.16)
IRON 24H UR-MRATE: 92 MCG/DL (ref 50–175)
IRON SATN SFR SERPL: 30.77 % (ref 15–50)
MCH RBC QN AUTO: 32.2 PG (ref 27–31)
MCHC RBC AUTO-ENTMCNC: 32.3 G/DL (ref 32–36)
MCV RBC AUTO: 99.5 FL (ref 80–99)
PLATELET # BLD AUTO: 88 10*3/MM3 (ref 150–450)
PMV BLD AUTO: 10.5 FL (ref 6–12)
POTASSIUM BLD-SCNC: 4.5 MMOL/L (ref 3.5–5.5)
PROT SERPL-MCNC: 6.4 G/DL (ref 5.7–8.2)
PROTHROMBIN TIME: 14.1 SECONDS (ref 11.2–14.3)
RBC # BLD AUTO: 4.26 10*6/MM3 (ref 3.89–5.14)
SODIUM BLD-SCNC: 136 MMOL/L (ref 132–146)
TIBC SERPL-MCNC: 299 MCG/DL (ref 250–450)
WBC NRBC COR # BLD: 4.7 10*3/MM3 (ref 3.5–10.8)

## 2019-02-12 PROCEDURE — 85610 PROTHROMBIN TIME: CPT

## 2019-02-12 PROCEDURE — 86038 ANTINUCLEAR ANTIBODIES: CPT

## 2019-02-12 PROCEDURE — 99204 OFFICE O/P NEW MOD 45 MIN: CPT | Performed by: INTERNAL MEDICINE

## 2019-02-12 PROCEDURE — 82784 ASSAY IGA/IGD/IGG/IGM EACH: CPT

## 2019-02-12 PROCEDURE — 86708 HEPATITIS A ANTIBODY: CPT

## 2019-02-12 PROCEDURE — 85027 COMPLETE CBC AUTOMATED: CPT

## 2019-02-12 PROCEDURE — 80074 ACUTE HEPATITIS PANEL: CPT

## 2019-02-12 PROCEDURE — 36415 COLL VENOUS BLD VENIPUNCTURE: CPT

## 2019-02-12 PROCEDURE — 82104 ALPHA-1-ANTITRYPSIN PHENO: CPT

## 2019-02-12 PROCEDURE — 83550 IRON BINDING TEST: CPT

## 2019-02-12 PROCEDURE — 83516 IMMUNOASSAY NONANTIBODY: CPT

## 2019-02-12 PROCEDURE — 86706 HEP B SURFACE ANTIBODY: CPT

## 2019-02-12 PROCEDURE — 80053 COMPREHEN METABOLIC PANEL: CPT

## 2019-02-12 PROCEDURE — 83540 ASSAY OF IRON: CPT

## 2019-02-12 PROCEDURE — 86704 HEP B CORE ANTIBODY TOTAL: CPT

## 2019-02-12 PROCEDURE — 82103 ALPHA-1-ANTITRYPSIN TOTAL: CPT

## 2019-02-12 NOTE — PROGRESS NOTES
PCP: RICARDO Delcid MD    Chief Complaint   Patient presents with   • NEW PATIENT        History of Present Illness:   Jeevan Cardoso is a 58 y.o. female who presents to the GI clinic for assessment of thrombocytopenia.  She has longstanding obesity and newly uncontroleld dm2 with a1c checked to be 11.7.  Dr. Darren Day assessed her for fatigue, easy bruising and thrombocytopenia.  She has occasional hypogastric abdominal pain that is achy, unpredictable.  She has had an extensive workup that has shown splenomegaly, inr 1.2 without dic, appearance of fatty liver and dilated portal vein.  CT a/p without pancreatic lesion and no liver mass seen.      Past Medical History:   Diagnosis Date   • Arthritis    • Erosive (osteo)arthritis    • GERD (gastroesophageal reflux disease)    • Migraine    • Vertigo        Past Surgical History:   Procedure Laterality Date   • GALLBLADDER SURGERY     • WISDOM TOOTH EXTRACTION           Current Outpatient Medications:   •  ALPRAZolam (XANAX) 0.5 MG tablet, Take 0.5 mg by mouth 3 (Three) Times a Day As Needed., Disp: , Rfl:   •  Coenzyme Q10 (COQ10) 200 MG capsule, Take 200 mg by mouth Daily., Disp: , Rfl:   •  eletriptan (RELPAX) 40 MG tablet, Take 40 mg by mouth. may repeat in 2 hours if necessary, Disp: , Rfl:   •  FLUoxetine (PROZAC) 40 MG capsule, Take  by mouth daily., Disp: , Rfl:   •  furosemide (LASIX) 20 MG tablet, take 1 tablet by mouth once daily if needed, Disp: , Rfl: 0  •  levocetirizine (XYZAL) 5 MG tablet, , Disp: , Rfl:   •  meloxicam (MOBIC) 7.5 MG tablet, Take 7.5 mg by mouth Daily., Disp: , Rfl:   •  metoprolol succinate XL (TOPROL-XL) 50 MG 24 hr tablet, , Disp: , Rfl:   •  pantoprazole (PROTONIX) 40 MG EC tablet, Take  by mouth daily., Disp: , Rfl:   •  promethazine (PHENERGAN) 12.5 MG tablet, take 1 tablet by mouth every 4 to 6 hours if needed, Disp: , Rfl: 0  •  Riboflavin 100 MG tablet, , Disp: , Rfl:   •  tiZANidine (ZANAFLEX) 4 MG tablet, , Disp: , Rfl:   •   traMADol (ULTRAM) 50 MG tablet, Take 50 mg by mouth Every 8 (Eight) Hours As Needed., Disp: , Rfl:   •  traMADol (ULTRAM) 50 MG tablet, Ultram 50 mg tablet  Take 1 tablet every day by oral route., Disp: , Rfl:   •  Venlafaxine HCl (EFFEXOR XR PO), Take 225 mg by mouth Every Night., Disp: , Rfl:   •  venlafaxine XR (EFFEXOR XR) 150 MG 24 hr capsule, Effexor  mg capsule,extended release  Take 1 capsule every day by oral route., Disp: , Rfl:     Current Facility-Administered Medications:   •  OnabotulinumtoxinA 155 Units, 155 Units, Intramuscular, Q3 Months, Carl Conway MD, 155 Units at 07/14/17 1334    Allergies   Allergen Reactions   • Penicillins Anaphylaxis   • Cefaclor Itching   • Hydrocodone Itching   • Oxycodone-Acetaminophen Itching   • Vancomycin Itching       Family History   Problem Relation Age of Onset   • Breast cancer Cousin 40   • Ovarian cancer Cousin 50   • Hypertension Mother    • Stroke Mother    • Cancer Father        Social History     Socioeconomic History   • Marital status:      Spouse name: Not on file   • Number of children: Not on file   • Years of education: Not on file   • Highest education level: Not on file   Social Needs   • Financial resource strain: Not on file   • Food insecurity - worry: Not on file   • Food insecurity - inability: Not on file   • Transportation needs - medical: Not on file   • Transportation needs - non-medical: Not on file   Occupational History   • Not on file   Tobacco Use   • Smoking status: Never Smoker   • Smokeless tobacco: Never Used   Substance and Sexual Activity   • Alcohol use: Yes     Comment: once a week    • Drug use: No   • Sexual activity: Not on file   Other Topics Concern   • Not on file   Social History Narrative   • Not on file       Review of Systems   Constitutional: Positive for appetite change, fatigue (LAST 6 MONTHS) and unexpected weight change.   Eyes: Negative.    Respiratory: Negative.    Cardiovascular: Negative.     Gastrointestinal: Positive for abdominal distention, abdominal pain, constipation, diarrhea and nausea.   Endocrine: Negative.    Genitourinary: Negative.    Musculoskeletal: Positive for gait problem.   Allergic/Immunologic: Negative.    Neurological: Positive for dizziness (BALANCE ISSUES BUT NOT ALL DIZINESS) and headaches.   Hematological: Negative.    Psychiatric/Behavioral: The patient is nervous/anxious.      All other systems reviewed and are negative.    Vitals:    02/12/19 0850   BP: 124/82   Pulse: 66   Resp: 18   Temp: 96.8 °F (36 °C)   SpO2: 99%       Physical Exam  General Appearance:  Vitals as above. no acute distress  Obese, antalgic gait with a cane  Head/face:  Normocephalic, atraumatic  Eyes:   EOMI, no conjunctivitis or icterus   Nose/Sinuses:  Nares patent bilaterally without discharge or lesions  Mouth/Throat:  Posterior pharynx is pink without drainage or exudates;  dentition is in good condition and repair  Neck:  trachea is midline, no thyromegaly  Lungs:  Clear to auscultation bilaterally  Heart:  Regular rate and rhythm without murmur, gallop or rub  Abdomen:  Soft, non-tender to palpation, no obvious masses, bowel sounds positive in four quadrants; no abdominal bruits; no guarding or rebound tenderness  Neurologic:  Alert; no focal deficits; age appropriate behavior and speech  Psychiatric: mood and affect are congruent  Vascular: extremities without edema  Skin: no rash or cyanosis.      Assessment/Plan  1.) Obesity  2.) Newly uncontrolled dm2  3.) Thrombocytopenia, suggestive of cirrhosis  4.) Splenomegaly  5.) + brandin  The constellation of her symptoms including ast 50s/alt 20/30s with thrombocytopenia, splenomegaly, INR 1.2, glucose 500s and obesity is most suggestive of DARBY induced cirrhosis. Will assess for serologic mimickers of DARBY.     Plan:  Serology. EGD to assess for varices. No signs of decompensation.  Continue to follow with Dr. Day at his discretion.    Future  consideration: if picture equivocal, liver biopsy    rtc in 3 months    David Bustillos MD  2/12/2019

## 2019-02-13 DIAGNOSIS — Z12.11 SCREENING FOR COLON CANCER: Primary | ICD-10-CM

## 2019-02-13 LAB
ACTIN IGG SERPL-ACNC: 14 UNITS (ref 0–19)
ANA SER QL: NEGATIVE
HAV AB SER QL IA: NEGATIVE
HBV CORE AB SER DONR QL IA: NEGATIVE
IGA SERPL-MCNC: 747 MG/DL (ref 87–352)
IGG SERPL-MCNC: 1420 MG/DL (ref 700–1600)
IGM SERPL-MCNC: 188 MG/DL (ref 26–217)

## 2019-02-13 RX ORDER — SODIUM, POTASSIUM,MAG SULFATES 17.5-3.13G
SOLUTION, RECONSTITUTED, ORAL ORAL
Qty: 2 BOTTLE | Refills: 0 | Status: SHIPPED | OUTPATIENT
Start: 2019-02-13 | End: 2019-02-13

## 2019-02-15 LAB
A1AT SERPL-MCNC: 153 MG/DL (ref 90–200)
PHENOTYPE: NORMAL

## 2019-02-21 ENCOUNTER — OUTSIDE FACILITY SERVICE (OUTPATIENT)
Dept: GASTROENTEROLOGY | Facility: CLINIC | Age: 59
End: 2019-02-21

## 2019-02-21 PROCEDURE — 43235 EGD DIAGNOSTIC BRUSH WASH: CPT | Performed by: INTERNAL MEDICINE

## 2019-02-27 ENCOUNTER — APPOINTMENT (OUTPATIENT)
Dept: DIABETES SERVICES | Facility: HOSPITAL | Age: 59
End: 2019-02-27

## 2019-03-06 ENCOUNTER — TRANSCRIBE ORDERS (OUTPATIENT)
Dept: DIABETES SERVICES | Facility: HOSPITAL | Age: 59
End: 2019-03-06

## 2019-03-06 ENCOUNTER — TELEPHONE (OUTPATIENT)
Dept: GASTROENTEROLOGY | Facility: CLINIC | Age: 59
End: 2019-03-06

## 2019-03-06 NOTE — TELEPHONE ENCOUNTER
Called patient back. There wasn't any biopsy done when patient had EGD per Dr Bustillos. Repeat EGD in 3 months with an 24 hours clear liquid diet.

## 2019-03-13 ENCOUNTER — APPOINTMENT (OUTPATIENT)
Dept: DIABETES SERVICES | Facility: HOSPITAL | Age: 59
End: 2019-03-13

## 2019-03-14 ENCOUNTER — OFFICE VISIT (OUTPATIENT)
Dept: ONCOLOGY | Facility: CLINIC | Age: 59
End: 2019-03-14

## 2019-03-14 ENCOUNTER — LAB (OUTPATIENT)
Dept: LAB | Facility: HOSPITAL | Age: 59
End: 2019-03-14

## 2019-03-14 VITALS
DIASTOLIC BLOOD PRESSURE: 69 MMHG | HEART RATE: 73 BPM | TEMPERATURE: 97.2 F | OXYGEN SATURATION: 92 % | BODY MASS INDEX: 43.59 KG/M2 | WEIGHT: 246 LBS | HEIGHT: 63 IN | RESPIRATION RATE: 18 BRPM | SYSTOLIC BLOOD PRESSURE: 108 MMHG

## 2019-03-14 DIAGNOSIS — D69.6 THROMBOCYTOPENIA (HCC): ICD-10-CM

## 2019-03-14 DIAGNOSIS — D69.6 THROMBOCYTOPENIA (HCC): Primary | Chronic | ICD-10-CM

## 2019-03-14 LAB
ERYTHROCYTE [DISTWIDTH] IN BLOOD BY AUTOMATED COUNT: 14.3 % (ref 11.3–14.5)
HCT VFR BLD AUTO: 38.8 % (ref 34.5–44)
HGB BLD-MCNC: 12.9 G/DL (ref 11.5–15.5)
LYMPHOCYTES # BLD AUTO: 2 10*3/MM3 (ref 0.6–4.8)
LYMPHOCYTES NFR BLD AUTO: 45.2 % (ref 24–44)
MCH RBC QN AUTO: 33 PG (ref 27–31)
MCHC RBC AUTO-ENTMCNC: 33.2 G/DL (ref 32–36)
MCV RBC AUTO: 99.4 FL (ref 80–99)
MONOCYTES # BLD AUTO: 0.3 10*3/MM3 (ref 0–1)
MONOCYTES NFR BLD AUTO: 6.3 % (ref 0–12)
NEUTROPHILS # BLD AUTO: 2.2 10*3/MM3 (ref 1.5–8.3)
NEUTROPHILS NFR BLD AUTO: 48.5 % (ref 41–71)
PLATELET # BLD AUTO: 118 10*3/MM3 (ref 150–450)
PMV BLD AUTO: 7.5 FL (ref 6–12)
RBC # BLD AUTO: 3.9 10*6/MM3 (ref 3.89–5.14)
WBC NRBC COR # BLD: 4.5 10*3/MM3 (ref 3.5–10.8)

## 2019-03-14 PROCEDURE — 99214 OFFICE O/P EST MOD 30 MIN: CPT | Performed by: INTERNAL MEDICINE

## 2019-03-14 PROCEDURE — 36415 COLL VENOUS BLD VENIPUNCTURE: CPT

## 2019-03-14 PROCEDURE — 85025 COMPLETE CBC W/AUTO DIFF WBC: CPT

## 2019-03-14 RX ORDER — GLIMEPIRIDE 4 MG/1
4 TABLET ORAL DAILY
COMMUNITY
Start: 2019-02-10 | End: 2022-01-10

## 2019-03-14 RX ORDER — METFORMIN HYDROCHLORIDE 500 MG/1
500 TABLET, EXTENDED RELEASE ORAL 2 TIMES DAILY
COMMUNITY
Start: 2019-02-10

## 2019-03-14 NOTE — PROGRESS NOTES
CHIEF COMPLAINT: Thrombocytopenia due to portal hypertension    Problem List:  Oncology/Hematology History    1. Thrombocytopenia secondary to portal hypertension  2. Severe obstructive sleep apnea  3. Normal creatinine and liver enzymes    -1/14/19: Saw me for the first time with platelet count by history of 76,000 in November 2018 and 83,000 in October 2018 with normal creatinine and liver enzymes.  No petechiae or ecchymoses.  No arthritis to suggest rheumatoid disease.  No rashes.  No diarrhea.  No abdominal distention she's been aware of.  No change in the color caliber consistency of her stools.  No consumption of undercooked beef that she is aware of.  No known ulcers.  I recommended checking a peripheral smear for platelet clumping and schistocytes, d-dimer, fibrin split products, PT, PTT, LINETTE, rheumatoid factor, and liver spleen ultrasound with her severe obstructive sleep apnea with high risk of pulmonary hypertension and possible functional portal hypertension with splenomegaly though I did not feel an enlarged spleen on exam.  Nonetheless she has large abdominal girth that would make that less accurate on exam.  She is had a 36 pound unexplained weight loss over the last 10 months with a great deal of fatigue and to that end for the potential of immune mediated destruction of platelets in the face of lymphoproliferative disorder I will check a CT of her chest abdomen and pelvis as well.  I will also check Helicobacter pylori see urology's and ultimately she may need to go back to Dr. Alvarez who did her colonoscopy 2 years ago and EGD about 7 years ago if there is any hint of ongoing reflux symptoms/helicobacter pylori    -1/31/19: Came back to me to go over the results of the above tests.  Liver spleen ultrasound showed portal vein dilated 1.5 cm with 14.7 cm splenomegaly and hepatomegaly with abnormal liver echogenicity indicative of parenchymal disease.  Serum helical back or pylori was negative.   1/21/19 CT chest abdomen pelvis showed 13 cm splenomegaly with slight increased thickening of the spleen compared to prominent spleen seen in 2013 on prior CT.  Peripheral smear showed decreased platelets with no schistocytes or platelet clumping.  D-dimer, fibrin split products, and fibrinogen were all negative for DIC.  Her INR was 1.2 but with a normal PTT.  Her LINETTE was positive at 1:320 in a homogeneous pattern with rheumatoid factor negative.  Hence, I suspect this to be some component of portal hypertensive sequestration with hypersplenism and hepatomegaly on liver spleen ultrasound with Dr. Jonathan Florez as well as perhaps autoimmune thrombocytopenic purpura with a positive LINETTE and no peripheral stigmata for lupus.  I will make referrals to GI as well as rheumatology to sort out associated autoimmune and hepatic disorders.  I'm checking her liver enzymes along with her CBC and quantitative immunoglobulins as well as a TSH in light of her tendency to  infections as well as her severe fatigue 1/31/19 and will see her back in a couple of months to see what other specialists have come up with.    -2/12/2019 results: EGD suggested esophageal dysmotility with small esophageal varix suggesting portal hypertension and delayed gastric emptying likely related to diabetes, nonalcoholic fatty liver disease and repeat EGD within 3 months recommended by Dr. Bustillos.  Transferrin saturation normal at 31 with iron normal 92.  Elevated IgA with otherwise normal immunoglobulin levels.  Hepatitis B surface antibody positive otherwise hepatitis AB and C serologies negative.  AST 62 alkaline phosphatase 104.  Anti-smooth muscle  antibody negative.  LINETTE negative.  Alpha 1 antitrypsin phenotype MM.    -3/14/2019 visit: Platelets 118,000 otherwise normal CBC.  She has portal hypertension and will follow with Dr. Bustillos on her counts as well as with Dr. Delcid.  There is nothing for me to add to her care at this junction.  Should  she need procedures and her platelets are running below 80,000, avatrombopag is available to raise her platelet count to prep her for procedures and I can see him a couple of weeks in advance of his planned procedures to help with that as need be into the future.        Thrombocytopenia (CMS/HCC)    1/14/2019 Initial Diagnosis     Thrombocytopenia (CMS/HCC)         1/21/2019 Imaging     CT chest, abdomen and pelvis IMPRESSION:  1. No acute intrathoracic pathology or evidence for active malignancy  within the chest.  2. Spleen is borderline enlarged in craniocaudal dimension at 13 cm with  increased thickness on transaxial measurements especially when compared  to 2013 CT evaluation concerning for splenomegaly with splenule at the  splenic hilum.  3. No bulky adenopathy within the abdomen or pelvis. No ascites.            HISTORY OF PRESENT ILLNESS:  The patient is a 58 y.o. female, here for follow up on management of thrombocytopenia due to portal hypertension.  No bleeding issues.      Past Medical History:   Diagnosis Date   • Arthritis    • Erosive (osteo)arthritis    • GERD (gastroesophageal reflux disease)    • Migraine    • Vertigo      Past Surgical History:   Procedure Laterality Date   • ENDOSCOPY  02/21/2019    Dr. Bustillos   • GALLBLADDER SURGERY     • WISDOM TOOTH EXTRACTION         Allergies   Allergen Reactions   • Penicillins Anaphylaxis   • Cefaclor Itching   • Hydrocodone Itching   • Oxycodone-Acetaminophen Itching   • Vancomycin Itching       Family History and Social History reviewed and changed as necessary      REVIEW OF SYSTEM:   Review of Systems   Constitutional: Negative for appetite change, chills, diaphoresis, fatigue, fever and unexpected weight change.   HENT:   Negative for mouth sores, sore throat and trouble swallowing.    Eyes: Negative for icterus.   Respiratory: Negative for cough, hemoptysis and shortness of breath.    Cardiovascular: Negative for chest pain, leg swelling and  "palpitations.   Gastrointestinal: Negative for abdominal distention, abdominal pain, blood in stool, constipation, diarrhea, nausea and vomiting.   Endocrine: Negative for hot flashes.   Genitourinary: Negative for bladder incontinence, difficulty urinating, dysuria, frequency and hematuria.    Musculoskeletal: Negative for gait problem, neck pain and neck stiffness.   Skin: Negative for rash.   Neurological: Negative for dizziness, gait problem, headaches, light-headedness and numbness.   Hematological: Negative for adenopathy. Does not bruise/bleed easily.   Psychiatric/Behavioral: Negative for depression. The patient is not nervous/anxious.    All other systems reviewed and are negative.       PHYSICAL EXAM    Vitals:    03/14/19 0922   BP: 108/69   Pulse: 73   Resp: 18   Temp: 97.2 °F (36.2 °C)   SpO2: 92%   Weight: 112 kg (246 lb)   Height: 160 cm (63\")     Constitutional: Appears well-developed and well-nourished. No distress.   ECOG: (0) Fully active, able to carry on all predisease performance without restriction  HENT:   Head: Normocephalic.   Mouth/Throat: Oropharynx is clear and moist.   Eyes: Conjunctivae are normal. Pupils are equal, round, and reactive to light. No scleral icterus.   Neck: Neck supple. No JVD present. No thyromegaly present.   Cardiovascular: Normal rate, regular rhythm and normal heart sounds.    Pulmonary/Chest: Breath sounds normal. No respiratory distress.   Abdominal: Soft. Exhibits no distension and no mass. There is no hepatosplenomegaly. There is no tenderness. There is no rebound and no guarding.   Musculoskeletal:Exhibits no edema, tenderness or deformity.   Neurological: Alert and oriented to person, place, and time. Exhibits normal muscle tone.   Skin: No ecchymosis, no petechiae and no rash noted. Not diaphoretic. No cyanosis. Nails show no clubbing.   Psychiatric: Normal mood and affect.   Vitals reviewed.      Lab Results   Component Value Date    HGB 12.9 03/14/2019    " HCT 38.8 03/14/2019    MCV 99.4 (H) 03/14/2019     (L) 03/14/2019    WBC 4.50 03/14/2019    NEUTROABS 2.20 03/14/2019    LYMPHSABS 2.00 03/14/2019    MONOSABS 0.30 03/14/2019    EOSABS 0.14 10/07/2015    BASOSABS 0.01 10/07/2015       Lab Results   Component Value Date    GLUCOSE 126 (H) 02/12/2019    BUN 15 02/12/2019    CREATININE 0.91 02/12/2019     02/12/2019    K 4.5 02/12/2019     02/12/2019    CO2 30.0 02/12/2019    CALCIUM 9.1 02/12/2019    PROTEINTOT 6.4 02/12/2019    ALBUMIN 3.46 02/12/2019    BILITOT 0.6 02/12/2019    ALKPHOS 104 (H) 02/12/2019    AST 62 (H) 02/12/2019    ALT 36 02/12/2019                   ASSESSMENT & PLAN:      1. Thrombocytopenia  2. Severe obstructive sleep apneaNormal creatinine and liver enzymes    -1/14/19: Saw me for the first time with platelet count by history of 76,000 in November 2018 and 83,000 in October 2018 with normal creatinine and liver enzymes.  No petechiae or ecchymoses.  No arthritis to suggest rheumatoid disease.  No rashes.  No diarrhea.  No abdominal distention she's been aware of.  No change in the color caliber consistency of her stools.  No consumption of undercooked beef that she is aware of.  No known ulcers.  I recommended checking a peripheral smear for platelet clumping and schistocytes, d-dimer, fibrin split products, PT, PTT, LINETTE, rheumatoid factor, and liver spleen ultrasound with her severe obstructive sleep apnea with high risk of pulmonary hypertension and possible functional portal hypertension with splenomegaly though I did not feel an enlarged spleen on exam.  Nonetheless she has large abdominal girth that would make that less accurate on exam.  She is had a 36 pound unexplained weight loss over the last 10 months with a great deal of fatigue and to that end for the potential of immune mediated destruction of platelets in the face of lymphoproliferative disorder I will check a CT of her chest abdomen and pelvis as well.  I  will also check Helicobacter pylori see urology's and ultimately she may need to go back to Dr. Alvarez who did her colonoscopy 2 years ago and EGD about 7 years ago if there is any hint of ongoing reflux symptoms/helicobacter pylori    -1/31/19: Came back to me to go over the results of the above tests.  Liver spleen ultrasound showed portal vein dilated 1.5 cm with 14.7 cm splenomegaly and hepatomegaly with abnormal liver echogenicity indicative of parenchymal disease.  Serum helical back or pylori was negative.  1/21/19 CT chest abdomen pelvis showed 13 cm splenomegaly with slight increased thickening of the spleen compared to prominent spleen seen in 2013 on prior CT.  Peripheral smear showed decreased platelets with no schistocytes or platelet clumping.  D-dimer, fibrin split products, and fibrinogen were all negative for DIC.  Her INR was 1.2 but with a normal PTT.  Her LINETTE was positive at 1:320 in a homogeneous pattern with rheumatoid factor negative.  Hence, I suspect this to be some component of portal hypertensive sequestration with hypersplenism and hepatomegaly on liver spleen ultrasound with Dr. Jonathan Florez as well as perhaps autoimmune thrombocytopenic purpura with a positive LINETTE and no peripheral stigmata for lupus.  I will make referrals to GI as well as rheumatology to sort out associated autoimmune and hepatic disorders.  I'm checking her liver enzymes along with her CBC and quantitative immunoglobulins as well as a TSH in light of her tendency to  infections as well as her severe fatigue 1/31/19 and will see her back in a couple of months to see what other specialists have come up with.    -2/12/2019 results: EGD suggested esophageal dysmotility with small esophageal varix suggesting portal hypertension and delayed gastric emptying likely related to diabetes, nonalcoholic fatty liver disease and repeat EGD within 3 months recommended by Dr. Bustillos.  Transferrin saturation normal at 31 with  iron normal 92.  Elevated IgA with otherwise normal immunoglobulin levels.  Hepatitis B surface antibody positive otherwise hepatitis AB and C serologies negative.  AST 62 alkaline phosphatase 104.  Anti-smooth muscle  antibody negative.  LINETTE negative.  Alpha 1 antitrypsin phenotype MM.    -3/14/2019 visit: Platelets 118,000 otherwise normal CBC.  She has portal hypertension and will follow with Dr. Bustillos on her counts as well as with Dr. Delcid.  There is nothing for me to add to her care at this junction.  Should she need procedures and her platelets are running below 80,000, avatrombopag is available to raise her platelet count to prep her for procedures and I can see him a couple of weeks in advance of his planned procedures to help with that as need be into the future.    Discussed with patient 30 minutes greater than 50% spent counseling.  Darren Day MD    03/14/2019

## 2019-03-15 ENCOUNTER — HOSPITAL ENCOUNTER (OUTPATIENT)
Dept: DIABETES SERVICES | Facility: HOSPITAL | Age: 59
Setting detail: RECURRING SERIES
Discharge: HOME | End: 2019-03-15

## 2019-03-15 PROCEDURE — G0108 DIAB MANAGE TRN  PER INDIV: HCPCS | Performed by: DIETITIAN, REGISTERED

## 2019-04-12 ENCOUNTER — APPOINTMENT (OUTPATIENT)
Dept: DIABETES SERVICES | Facility: HOSPITAL | Age: 59
End: 2019-04-12

## 2019-05-29 ENCOUNTER — OUTSIDE FACILITY SERVICE (OUTPATIENT)
Dept: GASTROENTEROLOGY | Facility: CLINIC | Age: 59
End: 2019-05-29

## 2019-05-29 PROCEDURE — 43235 EGD DIAGNOSTIC BRUSH WASH: CPT | Performed by: INTERNAL MEDICINE

## 2019-07-31 ENCOUNTER — OFFICE VISIT (OUTPATIENT)
Dept: SLEEP MEDICINE | Facility: HOSPITAL | Age: 59
End: 2019-07-31

## 2019-07-31 VITALS
HEIGHT: 63 IN | HEART RATE: 63 BPM | DIASTOLIC BLOOD PRESSURE: 56 MMHG | WEIGHT: 248.6 LBS | SYSTOLIC BLOOD PRESSURE: 84 MMHG | OXYGEN SATURATION: 96 % | BODY MASS INDEX: 44.05 KG/M2

## 2019-07-31 DIAGNOSIS — F51.04 PSYCHOPHYSIOLOGICAL INSOMNIA: ICD-10-CM

## 2019-07-31 DIAGNOSIS — G47.33 OBSTRUCTIVE SLEEP APNEA, ADULT: Primary | ICD-10-CM

## 2019-07-31 PROCEDURE — 99213 OFFICE O/P EST LOW 20 MIN: CPT | Performed by: NURSE PRACTITIONER

## 2019-07-31 RX ORDER — VIT C/B6/B5/MAGNESIUM/HERB 173 50-5-6-5MG
CAPSULE ORAL
Status: ON HOLD | COMMUNITY
End: 2020-02-19

## 2019-07-31 RX ORDER — TEMAZEPAM 15 MG/1
15 CAPSULE ORAL NIGHTLY PRN
Qty: 30 CAPSULE | Refills: 0 | Status: SHIPPED | OUTPATIENT
Start: 2019-07-31 | End: 2019-08-06

## 2019-07-31 RX ORDER — ATORVASTATIN CALCIUM 10 MG/1
10 TABLET, FILM COATED ORAL DAILY
COMMUNITY

## 2019-07-31 RX ORDER — CHOLECALCIFEROL (VITAMIN D3) 125 MCG
5 CAPSULE ORAL
COMMUNITY
End: 2022-01-10

## 2019-07-31 RX ORDER — ONDANSETRON 4 MG/1
4 TABLET, FILM COATED ORAL EVERY 8 HOURS PRN
COMMUNITY
End: 2022-01-10

## 2019-07-31 RX ORDER — MULTIPLE VITAMINS W/ MINERALS TAB 9MG-400MCG
1 TAB ORAL DAILY
COMMUNITY

## 2019-07-31 RX ORDER — SACCHAROMYCES BOULARDII 250 MG
250 CAPSULE ORAL DAILY
COMMUNITY

## 2019-07-31 RX ORDER — POTASSIUM CHLORIDE 750 MG/1
10 CAPSULE, EXTENDED RELEASE ORAL AS NEEDED
COMMUNITY
End: 2023-01-23

## 2019-07-31 NOTE — PATIENT INSTRUCTIONS

## 2019-07-31 NOTE — PROGRESS NOTES
Chief Complaint:   Chief Complaint   Patient presents with   • Follow-up       HPI:    Jeevan Cardoso is a 58 y.o. female here for follow-up of sleep apnea.  Patient was last seen 7/28/2017.  Patient states she is doing very well with CPAP therapy.  However patient is only sleeping 4 hours nightly and the sleep is very fragmented.  She has trouble going to sleep as well as staying asleep.  She puts her Penitas score of 18/24.  Patient does have PRN Xanax but she tries not to take this at bedtime.  She is tried over-the-counter sleep aids without relief.  Patient is open today and trying a prescription of medication to help her sleep.  Patient does think something may be wrong with her machine as at times she feels she is not getting enough pressure.  She rarely sees her number go over 8 she would like to have this machine looked at.      Current medications are:   Current Outpatient Medications:   •  ALPRAZolam (XANAX) 0.5 MG tablet, Take 0.5 mg by mouth 3 (Three) Times a Day As Needed., Disp: , Rfl:   •  atorvastatin (LIPITOR) 10 MG tablet, Take 10 mg by mouth Daily., Disp: , Rfl:   •  Coenzyme Q10 (COQ10) 200 MG capsule, Take 200 mg by mouth Daily., Disp: , Rfl:   •  eletriptan (RELPAX) 40 MG tablet, Take 40 mg by mouth. may repeat in 2 hours if necessary, Disp: , Rfl:   •  furosemide (LASIX) 20 MG tablet, take 1 tablet by mouth once daily if needed 10 MG, Disp: , Rfl: 0  •  glimepiride (AMARYL) 4 MG tablet, Take 4 mg by mouth Daily., Disp: , Rfl:   •  levocetirizine (XYZAL) 5 MG tablet, , Disp: , Rfl:   •  magnesium oxide (MAGOX) 400 (241.3 Mg) MG tablet tablet, Take 400 mg by mouth Daily., Disp: , Rfl:   •  melatonin 5 MG tablet tablet, Take 10 mg by mouth., Disp: , Rfl:   •  meloxicam (MOBIC) 7.5 MG tablet, Take 7.5 mg by mouth Daily., Disp: , Rfl:   •  metFORMIN ER (GLUCOPHAGE-XR) 500 MG 24 hr tablet, Take 1,000 mg by mouth 2 (Two) Times a Day., Disp: , Rfl:   •  metoprolol succinate XL (TOPROL-XL) 50 MG  24 hr tablet, , Disp: , Rfl:   •  Milk Thistle 175 MG capsule, Take  by mouth., Disp: , Rfl:   •  Multiple Vitamins-Minerals (MULTIVITAMIN WITH MINERALS) tablet tablet, Take 1 tablet by mouth Daily., Disp: , Rfl:   •  ondansetron (ZOFRAN) 4 MG tablet, Take 4 mg by mouth Every 8 (Eight) Hours As Needed for Nausea or Vomiting., Disp: , Rfl:   •  pantoprazole (PROTONIX) 40 MG EC tablet, Take  by mouth daily., Disp: , Rfl:   •  potassium chloride (MICRO-K) 10 MEQ CR capsule, Take 10 mEq by mouth 2 (Two) Times a Day., Disp: , Rfl:   •  Riboflavin 100 MG tablet, 250 mg/day., Disp: , Rfl:   •  saccharomyces boulardii (FLORASTOR) 250 MG capsule, Take 250 mg by mouth 2 (Two) Times a Day., Disp: , Rfl:   •  tiZANidine (ZANAFLEX) 4 MG tablet, , Disp: , Rfl:   •  traMADol (ULTRAM) 50 MG tablet, Take 50 mg by mouth Every 8 (Eight) Hours As Needed., Disp: , Rfl:   •  Turmeric 500 MG capsule, Take  by mouth., Disp: , Rfl:   •  Venlafaxine HCl (EFFEXOR XR PO), Take 225 mg by mouth Every Night., Disp: , Rfl:   •  FLUoxetine (PROZAC) 40 MG capsule, Take  by mouth daily., Disp: , Rfl:   •  promethazine (PHENERGAN) 12.5 MG tablet, take 1 tablet by mouth every 4 to 6 hours if needed, Disp: , Rfl: 0  •  temazepam (RESTORIL) 15 MG capsule, Take 1 capsule by mouth At Night As Needed for Sleep., Disp: 30 capsule, Rfl: 0    Current Facility-Administered Medications:   •  OnabotulinumtoxinA 155 Units, 155 Units, Intramuscular, Q3 Months, Carl Conway MD, 155 Units at 07/14/17 1334.      The patient's relevant past medical, surgical, family and social history were reviewed and updated in Epic as appropriate.       Review of Systems   HENT: Positive for congestion.    Eyes: Positive for visual disturbance.   Respiratory: Positive for apnea and shortness of breath.    Cardiovascular: Positive for leg swelling.   Musculoskeletal: Positive for arthralgias, gait problem and joint swelling.   Neurological: Positive for headaches.    Psychiatric/Behavioral: Positive for dysphoric mood and sleep disturbance. The patient is nervous/anxious.    All other systems reviewed and are negative.        Objective:    Physical Exam   Constitutional: She is oriented to person, place, and time. She appears well-developed and well-nourished.   HENT:   Head: Normocephalic and atraumatic.   Mouth/Throat: Oropharynx is clear and moist.   Class 4 airway   Eyes: Conjunctivae are normal.   Neck: Neck supple. No thyromegaly present.   Cardiovascular: Normal rate and regular rhythm.   Pulmonary/Chest: Effort normal and breath sounds normal.   Lymphadenopathy:     She has no cervical adenopathy.   Neurological: She is alert and oriented to person, place, and time.   Skin: Skin is warm and dry.   Psychiatric: She has a normal mood and affect. Her behavior is normal. Judgment and thought content normal.   Nursing note and vitals reviewed.  179/1 80 days of use.  Greater than 4-hour use 77.8.  90% pressure 12.8.  AHI 1.0.  Download reviewed with patient.      ASSESSMENT/PLAN    Jeevan was seen today for follow-up.    Diagnoses and all orders for this visit:    Obstructive sleep apnea, adult  -     CPAP Therapy    Psychophysiological insomnia  -     temazepam (RESTORIL) 15 MG capsule; Take 1 capsule by mouth At Night As Needed for Sleep.            1. Counseled patient regarding multimodal approach with healthy nutrition, healthy sleep, regular physical activity, social activities, counseling, and medications. Encouraged to practice sleep position. Avoid alcohol and sedatives close to bedtime.  2. I have put in an order for repair or replace on a machine.  I also changed her settings to 12-20.  I will see patient back in 31 to 90 days.  3. Restoril 15 mg 1 p.o. nightly #30 with no refills given.  Ashwin is compliant and scanned to chart.    I have reviewed the results of my evaluation and impression and discussed my recommendations in detail with the patient.      Signed  by  Wendy Leigh, APRN    July 31, 2019      CC: RICARDO Delcid MD          No ref. provider found

## 2019-08-05 ENCOUNTER — TELEPHONE (OUTPATIENT)
Dept: SLEEP MEDICINE | Facility: HOSPITAL | Age: 59
End: 2019-08-05

## 2019-08-05 NOTE — TELEPHONE ENCOUNTER
Patient states that the restoril isnt doing much help for her and states that you mentioned that abien could be another option. She was wondering if ambien could be called in to [facility].

## 2019-08-06 RX ORDER — ZOLPIDEM TARTRATE 5 MG/1
5 TABLET ORAL NIGHTLY PRN
Qty: 30 TABLET | Refills: 0 | Status: SHIPPED | OUTPATIENT
Start: 2019-08-06 | End: 2019-10-22 | Stop reason: SDUPTHER

## 2019-08-06 NOTE — TELEPHONE ENCOUNTER
Advised patient that medication was sent to pharmacy. Patient verbalized understanding and appreciation.

## 2019-10-22 ENCOUNTER — OFFICE VISIT (OUTPATIENT)
Dept: SLEEP MEDICINE | Facility: HOSPITAL | Age: 59
End: 2019-10-22

## 2019-10-22 VITALS
OXYGEN SATURATION: 96 % | HEIGHT: 63 IN | DIASTOLIC BLOOD PRESSURE: 51 MMHG | WEIGHT: 247 LBS | HEART RATE: 65 BPM | BODY MASS INDEX: 43.77 KG/M2 | SYSTOLIC BLOOD PRESSURE: 104 MMHG

## 2019-10-22 DIAGNOSIS — G47.33 OBSTRUCTIVE SLEEP APNEA, ADULT: Primary | ICD-10-CM

## 2019-10-22 DIAGNOSIS — G47.34 NOCTURNAL HYPOXEMIA: ICD-10-CM

## 2019-10-22 DIAGNOSIS — F51.04 PSYCHOPHYSIOLOGICAL INSOMNIA: ICD-10-CM

## 2019-10-22 PROCEDURE — 99213 OFFICE O/P EST LOW 20 MIN: CPT | Performed by: NURSE PRACTITIONER

## 2019-10-22 RX ORDER — ZOLPIDEM TARTRATE 5 MG/1
5 TABLET ORAL NIGHTLY PRN
Qty: 30 TABLET | Refills: 0 | Status: SHIPPED | OUTPATIENT
Start: 2019-10-22 | End: 2022-01-10

## 2019-10-22 NOTE — PATIENT INSTRUCTIONS
Insomnia  Insomnia is a sleep disorder that makes it difficult to fall asleep or stay asleep. Insomnia can cause fatigue, low energy, difficulty concentrating, mood swings, and poor performance at work or school.  There are three different ways to classify insomnia:  · Difficulty falling asleep.  · Difficulty staying asleep.  · Waking up too early in the morning.  Any type of insomnia can be long-term (chronic) or short-term (acute). Both are common. Short-term insomnia usually lasts for three months or less. Chronic insomnia occurs at least three times a week for longer than three months.  What are the causes?  Insomnia may be caused by another condition, situation, or substance, such as:  · Anxiety.  · Certain medicines.  · Gastroesophageal reflux disease (GERD) or other gastrointestinal conditions.  · Asthma or other breathing conditions.  · Restless legs syndrome, sleep apnea, or other sleep disorders.  · Chronic pain.  · Menopause.  · Stroke.  · Abuse of alcohol, tobacco, or illegal drugs.  · Mental health conditions, such as depression.  · Caffeine.  · Neurological disorders, such as Alzheimer's disease.  · An overactive thyroid (hyperthyroidism).  Sometimes, the cause of insomnia may not be known.  What increases the risk?  Risk factors for insomnia include:  · Gender. Women are affected more often than men.  · Age. Insomnia is more common as you get older.  · Stress.  · Lack of exercise.  · Irregular work schedule or working night shifts.  · Traveling between different time zones.  · Certain medical and mental health conditions.  What are the signs or symptoms?  If you have insomnia, the main symptom is having trouble falling asleep or having trouble staying asleep. This may lead to other symptoms, such as:  · Feeling fatigued or having low energy.  · Feeling nervous about going to sleep.  · Not feeling rested in the morning.  · Having trouble concentrating.  · Feeling irritable, anxious, or depressed.  How  is this diagnosed?  This condition may be diagnosed based on:  · Your symptoms and medical history. Your health care provider may ask about:  ? Your sleep habits.  ? Any medical conditions you have.  ? Your mental health.  · A physical exam.  How is this treated?  Treatment for insomnia depends on the cause. Treatment may focus on treating an underlying condition that is causing insomnia. Treatment may also include:  · Medicines to help you sleep.  · Counseling or therapy.  · Lifestyle adjustments to help you sleep better.  Follow these instructions at home:  Eating and drinking    · Limit or avoid alcohol, caffeinated beverages, and cigarettes, especially close to bedtime. These can disrupt your sleep.  · Do not eat a large meal or eat spicy foods right before bedtime. This can lead to digestive discomfort that can make it hard for you to sleep.  Sleep habits    · Keep a sleep diary to help you and your health care provider figure out what could be causing your insomnia. Write down:  ? When you sleep.  ? When you wake up during the night.  ? How well you sleep.  ? How rested you feel the next day.  ? Any side effects of medicines you are taking.  ? What you eat and drink.  · Make your bedroom a dark, comfortable place where it is easy to fall asleep.  ? Put up shades or blackout curtains to block light from outside.  ? Use a white noise machine to block noise.  ? Keep the temperature cool.  · Limit screen use before bedtime. This includes:  ? Watching TV.  ? Using your smartphone, tablet, or computer.  · Stick to a routine that includes going to bed and waking up at the same times every day and night. This can help you fall asleep faster. Consider making a quiet activity, such as reading, part of your nighttime routine.  · Try to avoid taking naps during the day so that you sleep better at night.  · Get out of bed if you are still awake after 15 minutes of trying to sleep. Keep the lights down, but try reading or  doing a quiet activity. When you feel sleepy, go back to bed.  General instructions  · Take over-the-counter and prescription medicines only as told by your health care provider.  · Exercise regularly, as told by your health care provider. Avoid exercise starting several hours before bedtime.  · Use relaxation techniques to manage stress. Ask your health care provider to suggest some techniques that may work well for you. These may include:  ? Breathing exercises.  ? Routines to release muscle tension.  ? Visualizing peaceful scenes.  · Make sure that you drive carefully. Avoid driving if you feel very sleepy.  · Keep all follow-up visits as told by your health care provider. This is important.  Contact a health care provider if:  · You are tired throughout the day.  · You have trouble in your daily routine due to sleepiness.  · You continue to have sleep problems, or your sleep problems get worse.  Get help right away if:  · You have serious thoughts about hurting yourself or someone else.  If you ever feel like you may hurt yourself or others, or have thoughts about taking your own life, get help right away. You can go to your nearest emergency department or call:  · Your local emergency services (911 in the [place]  · A suicide crisis helpline, such as the National Suicide Prevention Lifeline at 1-954.202.5412. This is open 24 hours a day.  Summary  · Insomnia is a sleep disorder that makes it difficult to fall asleep or stay asleep.  · Insomnia can be long-term (chronic) or short-term (acute).  · Treatment for insomnia depends on the cause. Treatment may focus on treating an underlying condition that is causing insomnia.  · Keep a sleep diary to help you and your health care provider figure out what could be causing your insomnia.  This information is not intended to replace advice given to you by your health care provider. Make sure you discuss any questions you have with your health care provider.  Document  Released: 12/15/2001 Document Revised: 09/27/2018 Document Reviewed: 09/27/2018  [employer] Interactive Patient Education © 2019 [employer]  Sleep Apnea  Sleep apnea is a condition that affects breathing. People with sleep apnea have moments during sleep when their breathing pauses briefly or gets shallow. Sleep apnea can cause these symptoms:  · Trouble staying asleep.  · Sleepiness or tiredness during the day.  · Irritability.  · Loud snoring.  · Morning headaches.  · Trouble concentrating.  · Forgetting things.  · Less interest in sex.  · Being sleepy for no reason.  · Mood swings.  · Personality changes.  · Depression.  · Waking up a lot during the night to pee (urinate).  · Dry mouth.  · Sore throat.  Follow these instructions at home:    · Make any changes in your routine that your doctor recommends.  · Eat a healthy, well-balanced diet.  · Take over-the-counter and prescription medicines only as told by your doctor.  · Avoid using alcohol, calming medicines (sedatives), and narcotic medicines.  · Take steps to lose weight if you are overweight.  · If you were given a machine (device) to use while you sleep, use it only as told by your doctor.  · Do not use any tobacco products, such as cigarettes, chewing tobacco, and e-cigarettes. If you need help quitting, ask your doctor.  · Keep all follow-up visits as told by your doctor. This is important.  Contact a doctor if:  · The machine that you were given to use during sleep is uncomfortable or does not seem to be working.  · Your symptoms do not get better.  · Your symptoms get worse.  Get help right away if:  · Your chest hurts.  · You have trouble breathing in enough air (shortness of breath).  · You have an uncomfortable feeling in your back, arms, or stomach.  · You have trouble talking.  · One side of your body feels weak.  · A part of your face is hanging down (drooping).  These symptoms may be an emergency. Do not wait to see if the symptoms will go  away. Get medical help right away. Call your local emergency services (911 in the [place] Do not drive yourself to the hospital.  This information is not intended to replace advice given to you by your health care provider. Make sure you discuss any questions you have with your health care provider.  Document Released: 09/26/2009 Document Revised: 07/16/2018 Document Reviewed: 09/26/2016  [employer] Interactive Patient Education © 2019 [employer]

## 2019-10-22 NOTE — PROGRESS NOTES
"    Chief Complaint:   Chief Complaint   Patient presents with   • Insomnia       HPI:    Jeevan Cardoso is a 58 y.o. female here for follow-up of insomnia.  On patient's last visit 7/31/2019 we did try Restoril 15 mg to help with sleep.  This did not work for patient.  We did try Ambien 5 mg after she called me and states this is working very well.  It takes her approximately 15 to 30 minutes to go to sleep and has no difficulty staying asleep.  Patient settings are currently 12-20.  Patient is sleeping 4 to 6 hours nightly is often sleepy though she does not attribute this to sleep apnea as she does have other health issues.  Patient states she recently changed her mask and is doing \"much better.\"  Patient has an Serafina score of 15/24.  Patient wishes to continue CPAP and Ambien.        Current medications are:   Current Outpatient Medications:   •  ALPRAZolam (XANAX) 0.5 MG tablet, Take 0.5 mg by mouth 3 (Three) Times a Day As Needed., Disp: , Rfl:   •  atorvastatin (LIPITOR) 10 MG tablet, Take 10 mg by mouth Daily., Disp: , Rfl:   •  Coenzyme Q10 (COQ10) 200 MG capsule, Take 400 mg by mouth Daily., Disp: , Rfl:   •  eletriptan (RELPAX) 40 MG tablet, Take 40 mg by mouth. may repeat in 2 hours if necessary, Disp: , Rfl:   •  furosemide (LASIX) 20 MG tablet, take 1 tablet by mouth once daily if needed 10 MG, Disp: , Rfl: 0  •  glimepiride (AMARYL) 4 MG tablet, Take 4 mg by mouth Daily., Disp: , Rfl:   •  levocetirizine (XYZAL) 5 MG tablet, , Disp: , Rfl:   •  magnesium oxide (MAGOX) 400 (241.3 Mg) MG tablet tablet, Take 400 mg by mouth Daily., Disp: , Rfl:   •  melatonin 5 MG tablet tablet, Take 10 mg by mouth., Disp: , Rfl:   •  meloxicam (MOBIC) 7.5 MG tablet, Take 7.5 mg by mouth Daily., Disp: , Rfl:   •  metFORMIN ER (GLUCOPHAGE-XR) 500 MG 24 hr tablet, Take 1,000 mg by mouth 2 (Two) Times a Day., Disp: , Rfl:   •  metoprolol succinate XL (TOPROL-XL) 50 MG 24 hr tablet, , Disp: , Rfl:   •  Milk Thistle 175 " MG capsule, Take  by mouth., Disp: , Rfl:   •  Multiple Vitamins-Minerals (MULTIVITAMIN WITH MINERALS) tablet tablet, Take 1 tablet by mouth Daily., Disp: , Rfl:   •  ondansetron (ZOFRAN) 4 MG tablet, Take 4 mg by mouth Every 8 (Eight) Hours As Needed for Nausea or Vomiting., Disp: , Rfl:   •  pantoprazole (PROTONIX) 40 MG EC tablet, Take  by mouth daily., Disp: , Rfl:   •  potassium chloride (MICRO-K) 10 MEQ CR capsule, Take 10 mEq by mouth 2 (Two) Times a Day., Disp: , Rfl:   •  Riboflavin 100 MG tablet, 250 mg/day., Disp: , Rfl:   •  saccharomyces boulardii (FLORASTOR) 250 MG capsule, Take 250 mg by mouth 2 (Two) Times a Day., Disp: , Rfl:   •  tiZANidine (ZANAFLEX) 4 MG tablet, , Disp: , Rfl:   •  traMADol (ULTRAM) 50 MG tablet, Take 50 mg by mouth Every 8 (Eight) Hours As Needed., Disp: , Rfl:   •  Turmeric 500 MG capsule, Take  by mouth., Disp: , Rfl:   •  Venlafaxine HCl (EFFEXOR XR PO), Take 225 mg by mouth Every Night., Disp: , Rfl:   •  zolpidem (AMBIEN) 5 MG tablet, Take 1 tablet by mouth At Night As Needed for Sleep., Disp: 30 tablet, Rfl: 0  •  FLUoxetine (PROZAC) 40 MG capsule, Take  by mouth daily., Disp: , Rfl:   •  promethazine (PHENERGAN) 12.5 MG tablet, take 1 tablet by mouth every 4 to 6 hours if needed, Disp: , Rfl: 0    Current Facility-Administered Medications:   •  OnabotulinumtoxinA 155 Units, 155 Units, Intramuscular, Q3 Months, Carl Conway MD, 155 Units at 07/14/17 1334.      The patient's relevant past medical, surgical, family and social history were reviewed and updated in Epic as appropriate.       Review of Systems   Constitutional: Positive for fatigue.   HENT: Positive for congestion.    Eyes: Positive for visual disturbance.   Respiratory: Positive for apnea and shortness of breath.    Cardiovascular: Positive for chest pain and leg swelling.   Musculoskeletal: Positive for arthralgias, gait problem and joint swelling.   Neurological: Positive for headaches.    Psychiatric/Behavioral: Positive for dysphoric mood and sleep disturbance. The patient is nervous/anxious.    All other systems reviewed and are negative.        Objective:    Physical Exam   Constitutional: She is oriented to person, place, and time. She appears well-developed and well-nourished.   HENT:   Head: Normocephalic and atraumatic.   Mouth/Throat: Oropharynx is clear and moist.   Mallampati 4 anatomy   Eyes: Conjunctivae are normal.   Neck: Neck supple. No thyromegaly present.   Cardiovascular: Normal rate and regular rhythm.   Pulmonary/Chest: Effort normal and breath sounds normal.   Lymphadenopathy:     She has no cervical adenopathy.   Neurological: She is alert and oriented to person, place, and time.   Skin: Skin is warm and dry.   Psychiatric: She has a normal mood and affect. Her behavior is normal. Judgment and thought content normal.   Nursing note and vitals reviewed.  90/90 days of use.  Greater than 4-hour use 93.3%.  90% pressure 14.8.  AHI of 0.7.  Download reviewed with patient.      ASSESSMENT/PLAN    Jeevan was seen today for insomnia.    Diagnoses and all orders for this visit:    Obstructive sleep apnea, adult  -     CPAP Therapy    Psychophysiological insomnia  -     zolpidem (AMBIEN) 5 MG tablet; Take 1 tablet by mouth At Night As Needed for Sleep.    Nocturnal hypoxemia  -     Overnight Sleep Oximetry Study; Future            1. Counseled patient regarding multimodal approach with healthy nutrition, healthy sleep, regular physical activity, social activities, counseling, and medications. Encouraged to practice lateral sleep position. Avoid alcohol and sedatives close to bedtime.  2. Fill supplies x1 year.  3. Ambien 5 mg 1 p.o. nightly #30 no refills with 2 additional prescriptions given.  Ashwin is up-to-date and compliant.  4. Overnight oximetry ordered while wearing CPAP she will be called with these results.  I will see patient back in 3 to 4 months.    I have reviewed the  results of my evaluation and impression and discussed my recommendations in detail with the patient.      Signed by  Wendy Leigh, APRN    October 22, 2019      CC: RICARDO Delcid MD          No ref. provider found

## 2019-11-08 ENCOUNTER — TELEPHONE (OUTPATIENT)
Dept: FAMILY MEDICINE CLINIC | Facility: CLINIC | Age: 59
End: 2019-11-08

## 2019-11-08 DIAGNOSIS — G47.34 NOCTURNAL HYPOXEMIA: ICD-10-CM

## 2019-11-08 NOTE — TELEPHONE ENCOUNTER
CALLED PATIENT TO ADVISE OF PULSE OX RESULTS. O2 CORRECTED WITH PAP. REACHED  REQUESTING RETURN CALL

## 2020-02-18 ENCOUNTER — APPOINTMENT (OUTPATIENT)
Dept: CT IMAGING | Facility: HOSPITAL | Age: 60
End: 2020-02-18

## 2020-02-18 ENCOUNTER — APPOINTMENT (OUTPATIENT)
Dept: GENERAL RADIOLOGY | Facility: HOSPITAL | Age: 60
End: 2020-02-18

## 2020-02-18 ENCOUNTER — HOSPITAL ENCOUNTER (INPATIENT)
Facility: HOSPITAL | Age: 60
LOS: 3 days | Discharge: HOME | End: 2020-02-22
Attending: EMERGENCY MEDICINE | Admitting: INTERNAL MEDICINE

## 2020-02-18 DIAGNOSIS — K52.9 ACUTE COLITIS: ICD-10-CM

## 2020-02-18 DIAGNOSIS — R93.3 ABNORMAL CT SCAN, COLON: ICD-10-CM

## 2020-02-18 DIAGNOSIS — R79.89 ELEVATED LACTIC ACID LEVEL: ICD-10-CM

## 2020-02-18 DIAGNOSIS — K92.0 HEMATEMESIS WITH NAUSEA: Primary | ICD-10-CM

## 2020-02-18 DIAGNOSIS — K59.1 FUNCTIONAL DIARRHEA: ICD-10-CM

## 2020-02-18 DIAGNOSIS — R10.31 RLQ ABDOMINAL PAIN: ICD-10-CM

## 2020-02-18 LAB
BACTERIA UR QL AUTO: NORMAL /HPF
BILIRUB UR QL STRIP: NEGATIVE
CLARITY UR: CLEAR
COLOR UR: YELLOW
GLUCOSE BLDC GLUCOMTR-MCNC: 154 MG/DL (ref 70–130)
GLUCOSE UR STRIP-MCNC: NEGATIVE MG/DL
HGB UR QL STRIP.AUTO: NEGATIVE
KETONES UR QL STRIP: NEGATIVE
LEUKOCYTE ESTERASE UR QL STRIP.AUTO: ABNORMAL
NITRITE UR QL STRIP: NEGATIVE
PH UR STRIP.AUTO: 6.5 [PH] (ref 5–8)
PROT UR QL STRIP: NEGATIVE
RBC # UR: NORMAL /HPF
REF LAB TEST METHOD: NORMAL
SP GR UR STRIP: 1.01 (ref 1–1.03)
SQUAMOUS #/AREA URNS HPF: NORMAL /HPF
UROBILINOGEN UR QL STRIP: ABNORMAL
WBC UR QL AUTO: NORMAL /HPF

## 2020-02-18 PROCEDURE — 74176 CT ABD & PELVIS W/O CONTRAST: CPT

## 2020-02-18 PROCEDURE — 87804 INFLUENZA ASSAY W/OPTIC: CPT | Performed by: EMERGENCY MEDICINE

## 2020-02-18 PROCEDURE — 83605 ASSAY OF LACTIC ACID: CPT | Performed by: EMERGENCY MEDICINE

## 2020-02-18 PROCEDURE — 82962 GLUCOSE BLOOD TEST: CPT

## 2020-02-18 PROCEDURE — 99284 EMERGENCY DEPT VISIT MOD MDM: CPT

## 2020-02-18 PROCEDURE — 83690 ASSAY OF LIPASE: CPT

## 2020-02-18 PROCEDURE — 85007 BL SMEAR W/DIFF WBC COUNT: CPT

## 2020-02-18 PROCEDURE — 71045 X-RAY EXAM CHEST 1 VIEW: CPT

## 2020-02-18 PROCEDURE — 85025 COMPLETE CBC W/AUTO DIFF WBC: CPT

## 2020-02-18 PROCEDURE — 84145 PROCALCITONIN (PCT): CPT | Performed by: EMERGENCY MEDICINE

## 2020-02-18 PROCEDURE — 81001 URINALYSIS AUTO W/SCOPE: CPT

## 2020-02-18 PROCEDURE — 80053 COMPREHEN METABOLIC PANEL: CPT

## 2020-02-18 RX ORDER — SODIUM CHLORIDE 0.9 % (FLUSH) 0.9 %
10 SYRINGE (ML) INJECTION AS NEEDED
Status: DISCONTINUED | OUTPATIENT
Start: 2020-02-18 | End: 2020-02-22 | Stop reason: HOSPADM

## 2020-02-18 RX ORDER — ACETAMINOPHEN 500 MG
1000 TABLET ORAL ONCE
Status: COMPLETED | OUTPATIENT
Start: 2020-02-18 | End: 2020-02-18

## 2020-02-18 RX ADMIN — SODIUM CHLORIDE 1000 ML: 9 INJECTION, SOLUTION INTRAVENOUS at 23:47

## 2020-02-18 RX ADMIN — ACETAMINOPHEN 1000 MG: 500 TABLET, FILM COATED ORAL at 23:44

## 2020-02-19 PROBLEM — I10 ESSENTIAL HYPERTENSION: Status: ACTIVE | Noted: 2020-02-19

## 2020-02-19 PROBLEM — E66.813 OBESITY, CLASS III, BMI 40-49.9 (MORBID OBESITY): Status: ACTIVE | Noted: 2017-03-17

## 2020-02-19 PROBLEM — R19.7 ACUTE DIARRHEA: Status: ACTIVE | Noted: 2020-02-19

## 2020-02-19 PROBLEM — E87.20 LACTIC ACIDOSIS: Status: ACTIVE | Noted: 2020-02-19

## 2020-02-19 PROBLEM — K52.9 CHRONIC DIARRHEA: Status: ACTIVE | Noted: 2020-02-19

## 2020-02-19 PROBLEM — K21.9 GERD WITHOUT ESOPHAGITIS: Status: ACTIVE | Noted: 2020-02-19

## 2020-02-19 PROBLEM — G47.33 OSA (OBSTRUCTIVE SLEEP APNEA): Status: ACTIVE | Noted: 2020-02-19

## 2020-02-19 PROBLEM — K74.60 CIRRHOSIS OF LIVER: Status: ACTIVE | Noted: 2020-02-19

## 2020-02-19 PROBLEM — K52.9 COLITIS: Status: ACTIVE | Noted: 2020-02-19

## 2020-02-19 PROBLEM — E11.9 TYPE 2 DIABETES MELLITUS: Status: RESOLVED | Noted: 2020-02-19 | Resolved: 2020-02-19

## 2020-02-19 PROBLEM — Z86.69 HISTORY OF MIGRAINE HEADACHES: Status: ACTIVE | Noted: 2020-02-19

## 2020-02-19 PROBLEM — E11.9 TYPE 2 DIABETES MELLITUS WITHOUT COMPLICATION, WITHOUT LONG-TERM CURRENT USE OF INSULIN: Status: ACTIVE | Noted: 2020-02-19

## 2020-02-19 PROBLEM — K92.0 HEMATEMESIS WITH NAUSEA: Status: ACTIVE | Noted: 2020-02-18

## 2020-02-19 PROBLEM — F41.1 GENERALIZED ANXIETY DISORDER: Status: ACTIVE | Noted: 2020-02-19

## 2020-02-19 PROBLEM — K75.81 LIVER CIRRHOSIS SECONDARY TO NASH: Status: ACTIVE | Noted: 2020-02-19

## 2020-02-19 PROBLEM — E11.9 TYPE 2 DIABETES MELLITUS (HCC): Status: ACTIVE | Noted: 2020-02-19

## 2020-02-19 LAB
ALBUMIN SERPL-MCNC: 3.6 G/DL (ref 3.5–5.2)
ALBUMIN/GLOB SERPL: 0.8 G/DL
ALP SERPL-CCNC: 107 U/L (ref 39–117)
ALT SERPL W P-5'-P-CCNC: 19 U/L (ref 1–33)
AMMONIA BLD-SCNC: 33 UMOL/L (ref 11–51)
ANION GAP SERPL CALCULATED.3IONS-SCNC: 14 MMOL/L (ref 5–15)
ANION GAP SERPL CALCULATED.3IONS-SCNC: 14 MMOL/L (ref 5–15)
AST SERPL-CCNC: 39 U/L (ref 1–32)
BASOPHILS # BLD AUTO: 0 10*3/MM3 (ref 0–0.2)
BASOPHILS # BLD AUTO: 0.02 10*3/MM3 (ref 0–0.2)
BASOPHILS NFR BLD AUTO: 0 % (ref 0–1.5)
BASOPHILS NFR BLD AUTO: 0.5 % (ref 0–1.5)
BILIRUB SERPL-MCNC: 0.8 MG/DL (ref 0.2–1.2)
BUN BLD-MCNC: 8 MG/DL (ref 6–20)
BUN BLD-MCNC: 8 MG/DL (ref 6–20)
BUN/CREAT SERPL: 9 (ref 7–25)
BUN/CREAT SERPL: 9.8 (ref 7–25)
CALCIUM SPEC-SCNC: 8.8 MG/DL (ref 8.6–10.5)
CALCIUM SPEC-SCNC: 9.7 MG/DL (ref 8.6–10.5)
CHLORIDE SERPL-SCNC: 103 MMOL/L (ref 98–107)
CHLORIDE SERPL-SCNC: 105 MMOL/L (ref 98–107)
CO2 SERPL-SCNC: 23 MMOL/L (ref 22–29)
CO2 SERPL-SCNC: 24 MMOL/L (ref 22–29)
CREAT BLD-MCNC: 0.82 MG/DL (ref 0.57–1)
CREAT BLD-MCNC: 0.89 MG/DL (ref 0.57–1)
D-LACTATE SERPL-SCNC: 2.5 MMOL/L (ref 0.5–2)
D-LACTATE SERPL-SCNC: 3.9 MMOL/L (ref 0.5–2)
D-LACTATE SERPL-SCNC: 4 MMOL/L (ref 0.5–2)
DEPRECATED RDW RBC AUTO: 49.2 FL (ref 37–54)
DEPRECATED RDW RBC AUTO: 50.4 FL (ref 37–54)
EOSINOPHIL # BLD AUTO: 0.03 10*3/MM3 (ref 0–0.4)
EOSINOPHIL # BLD AUTO: 0.05 10*3/MM3 (ref 0–0.4)
EOSINOPHIL NFR BLD AUTO: 0.8 % (ref 0.3–6.2)
EOSINOPHIL NFR BLD AUTO: 1.2 % (ref 0.3–6.2)
ERYTHROCYTE [DISTWIDTH] IN BLOOD BY AUTOMATED COUNT: 13.7 % (ref 12.3–15.4)
ERYTHROCYTE [DISTWIDTH] IN BLOOD BY AUTOMATED COUNT: 13.7 % (ref 12.3–15.4)
FLUAV AG NPH QL: NEGATIVE
FLUBV AG NPH QL IA: NEGATIVE
GFR SERPL CREATININE-BSD FRML MDRD: 65 ML/MIN/1.73
GFR SERPL CREATININE-BSD FRML MDRD: 72 ML/MIN/1.73
GLOBULIN UR ELPH-MCNC: 4.4 GM/DL
GLUCOSE BLD-MCNC: 163 MG/DL (ref 65–99)
GLUCOSE BLD-MCNC: 183 MG/DL (ref 65–99)
GLUCOSE BLDC GLUCOMTR-MCNC: 130 MG/DL (ref 70–130)
GLUCOSE BLDC GLUCOMTR-MCNC: 160 MG/DL (ref 70–130)
GLUCOSE BLDC GLUCOMTR-MCNC: 177 MG/DL (ref 70–130)
GLUCOSE BLDC GLUCOMTR-MCNC: 264 MG/DL (ref 70–130)
HBA1C MFR BLD: 7.9 % (ref 4.8–5.6)
HCT VFR BLD AUTO: 39.9 % (ref 34–46.6)
HCT VFR BLD AUTO: 43.5 % (ref 34–46.6)
HGB BLD-MCNC: 13.1 G/DL (ref 12–15.9)
HGB BLD-MCNC: 14.2 G/DL (ref 12–15.9)
HOLD SPECIMEN: NORMAL
HOLD SPECIMEN: NORMAL
IMM GRANULOCYTES # BLD AUTO: 0.01 10*3/MM3 (ref 0–0.05)
IMM GRANULOCYTES # BLD AUTO: 0.01 10*3/MM3 (ref 0–0.05)
IMM GRANULOCYTES NFR BLD AUTO: 0.2 % (ref 0–0.5)
IMM GRANULOCYTES NFR BLD AUTO: 0.3 % (ref 0–0.5)
LACTATE HOLD SPECIMEN: NORMAL
LIPASE SERPL-CCNC: 25 U/L (ref 13–60)
LYMPHOCYTES # BLD AUTO: 1 10*3/MM3 (ref 0.7–3.1)
LYMPHOCYTES # BLD AUTO: 1.04 10*3/MM3 (ref 0.7–3.1)
LYMPHOCYTES NFR BLD AUTO: 24.1 % (ref 19.6–45.3)
LYMPHOCYTES NFR BLD AUTO: 26.3 % (ref 19.6–45.3)
MCH RBC QN AUTO: 31.8 PG (ref 26.6–33)
MCH RBC QN AUTO: 32.9 PG (ref 26.6–33)
MCHC RBC AUTO-ENTMCNC: 32.6 G/DL (ref 31.5–35.7)
MCHC RBC AUTO-ENTMCNC: 32.8 G/DL (ref 31.5–35.7)
MCV RBC AUTO: 100.3 FL (ref 79–97)
MCV RBC AUTO: 97.5 FL (ref 79–97)
MONOCYTES # BLD AUTO: 0.32 10*3/MM3 (ref 0.1–0.9)
MONOCYTES # BLD AUTO: 0.37 10*3/MM3 (ref 0.1–0.9)
MONOCYTES NFR BLD AUTO: 8.1 % (ref 5–12)
MONOCYTES NFR BLD AUTO: 8.9 % (ref 5–12)
NEUTROPHILS # BLD AUTO: 2.56 10*3/MM3 (ref 1.7–7)
NEUTROPHILS # BLD AUTO: 2.7 10*3/MM3 (ref 1.7–7)
NEUTROPHILS NFR BLD AUTO: 64.5 % (ref 42.7–76)
NEUTROPHILS NFR BLD AUTO: 65.1 % (ref 42.7–76)
NRBC BLD AUTO-RTO: 0 /100 WBC (ref 0–0.2)
NRBC BLD AUTO-RTO: 0 /100 WBC (ref 0–0.2)
PLATELET # BLD AUTO: 70 10*3/MM3 (ref 140–450)
PLATELET # BLD AUTO: 82 10*3/MM3 (ref 140–450)
PMV BLD AUTO: 10.2 FL (ref 6–12)
PMV BLD AUTO: 10.2 FL (ref 6–12)
POTASSIUM BLD-SCNC: 3.7 MMOL/L (ref 3.5–5.2)
POTASSIUM BLD-SCNC: 4.2 MMOL/L (ref 3.5–5.2)
PROCALCITONIN SERPL-MCNC: 0.1 NG/ML (ref 0.1–0.25)
PROT SERPL-MCNC: 8 G/DL (ref 6–8.5)
RBC # BLD AUTO: 3.98 10*6/MM3 (ref 3.77–5.28)
RBC # BLD AUTO: 4.46 10*6/MM3 (ref 3.77–5.28)
SODIUM BLD-SCNC: 141 MMOL/L (ref 136–145)
SODIUM BLD-SCNC: 142 MMOL/L (ref 136–145)
WBC NRBC COR # BLD: 3.96 10*3/MM3 (ref 3.4–10.8)
WBC NRBC COR # BLD: 4.15 10*3/MM3 (ref 3.4–10.8)
WHOLE BLOOD HOLD SPECIMEN: NORMAL
WHOLE BLOOD HOLD SPECIMEN: NORMAL

## 2020-02-19 PROCEDURE — 99223 1ST HOSP IP/OBS HIGH 75: CPT | Performed by: FAMILY MEDICINE

## 2020-02-19 PROCEDURE — 87040 BLOOD CULTURE FOR BACTERIA: CPT | Performed by: NURSE PRACTITIONER

## 2020-02-19 PROCEDURE — 80048 BASIC METABOLIC PNL TOTAL CA: CPT | Performed by: NURSE PRACTITIONER

## 2020-02-19 PROCEDURE — 0097U HC BIOFIRE FILMARRAY GI PANEL: CPT | Performed by: PHYSICIAN ASSISTANT

## 2020-02-19 PROCEDURE — 83605 ASSAY OF LACTIC ACID: CPT | Performed by: PHYSICIAN ASSISTANT

## 2020-02-19 PROCEDURE — 83036 HEMOGLOBIN GLYCOSYLATED A1C: CPT | Performed by: NURSE PRACTITIONER

## 2020-02-19 PROCEDURE — 25010000002 ONDANSETRON PER 1 MG: Performed by: NURSE PRACTITIONER

## 2020-02-19 PROCEDURE — 85025 COMPLETE CBC W/AUTO DIFF WBC: CPT | Performed by: NURSE PRACTITIONER

## 2020-02-19 PROCEDURE — 63710000001 INSULIN LISPRO (HUMAN) PER 5 UNITS: Performed by: NURSE PRACTITIONER

## 2020-02-19 PROCEDURE — 99254 IP/OBS CNSLTJ NEW/EST MOD 60: CPT | Performed by: PHYSICIAN ASSISTANT

## 2020-02-19 PROCEDURE — 25010000002 PROMETHAZINE PER 50 MG: Performed by: PHYSICIAN ASSISTANT

## 2020-02-19 PROCEDURE — 83605 ASSAY OF LACTIC ACID: CPT | Performed by: EMERGENCY MEDICINE

## 2020-02-19 PROCEDURE — 82140 ASSAY OF AMMONIA: CPT | Performed by: FAMILY MEDICINE

## 2020-02-19 PROCEDURE — 25010000002 METOCLOPRAMIDE PER 10 MG: Performed by: PHYSICIAN ASSISTANT

## 2020-02-19 PROCEDURE — 25010000002 VANCOMYCIN 10 G RECONSTITUTED SOLUTION

## 2020-02-19 PROCEDURE — 82962 GLUCOSE BLOOD TEST: CPT

## 2020-02-19 RX ORDER — TRAMADOL HYDROCHLORIDE 50 MG/1
50 TABLET ORAL EVERY 8 HOURS PRN
Status: DISCONTINUED | OUTPATIENT
Start: 2020-02-19 | End: 2020-02-19

## 2020-02-19 RX ORDER — MELOXICAM 7.5 MG/1
7.5 TABLET ORAL
Status: DISCONTINUED | OUTPATIENT
Start: 2020-02-19 | End: 2020-02-19

## 2020-02-19 RX ORDER — TIZANIDINE 4 MG/1
8 TABLET ORAL NIGHTLY
Status: DISCONTINUED | OUTPATIENT
Start: 2020-02-19 | End: 2020-02-22 | Stop reason: HOSPADM

## 2020-02-19 RX ORDER — TRAMADOL HYDROCHLORIDE 50 MG/1
50 TABLET ORAL EVERY 4 HOURS PRN
Status: DISCONTINUED | OUTPATIENT
Start: 2020-02-19 | End: 2020-02-22 | Stop reason: HOSPADM

## 2020-02-19 RX ORDER — METOCLOPRAMIDE HYDROCHLORIDE 5 MG/ML
10 INJECTION INTRAMUSCULAR; INTRAVENOUS
Status: COMPLETED | OUTPATIENT
Start: 2020-02-19 | End: 2020-02-20

## 2020-02-19 RX ORDER — VENLAFAXINE HYDROCHLORIDE 75 MG/1
225 CAPSULE, EXTENDED RELEASE ORAL
Status: DISCONTINUED | OUTPATIENT
Start: 2020-02-19 | End: 2020-02-22 | Stop reason: HOSPADM

## 2020-02-19 RX ORDER — ALPRAZOLAM 0.5 MG/1
0.5 TABLET ORAL 4 TIMES DAILY PRN
Status: DISCONTINUED | OUTPATIENT
Start: 2020-02-19 | End: 2020-02-22 | Stop reason: HOSPADM

## 2020-02-19 RX ORDER — METOPROLOL SUCCINATE 50 MG/1
50 TABLET, EXTENDED RELEASE ORAL
Status: DISCONTINUED | OUTPATIENT
Start: 2020-02-19 | End: 2020-02-19

## 2020-02-19 RX ORDER — SODIUM CHLORIDE 0.9 % (FLUSH) 0.9 %
10 SYRINGE (ML) INJECTION EVERY 12 HOURS SCHEDULED
Status: DISCONTINUED | OUTPATIENT
Start: 2020-02-19 | End: 2020-02-22 | Stop reason: HOSPADM

## 2020-02-19 RX ORDER — METOPROLOL SUCCINATE 50 MG/1
50 TABLET, EXTENDED RELEASE ORAL NIGHTLY
Status: DISCONTINUED | OUTPATIENT
Start: 2020-02-19 | End: 2020-02-22 | Stop reason: HOSPADM

## 2020-02-19 RX ORDER — ALPRAZOLAM 0.5 MG/1
0.5 TABLET ORAL 3 TIMES DAILY PRN
Status: DISCONTINUED | OUTPATIENT
Start: 2020-02-19 | End: 2020-02-19

## 2020-02-19 RX ORDER — ONDANSETRON 2 MG/ML
4 INJECTION INTRAMUSCULAR; INTRAVENOUS EVERY 6 HOURS PRN
Status: DISCONTINUED | OUTPATIENT
Start: 2020-02-19 | End: 2020-02-22 | Stop reason: HOSPADM

## 2020-02-19 RX ORDER — NICOTINE POLACRILEX 4 MG
15 LOZENGE BUCCAL
Status: DISCONTINUED | OUTPATIENT
Start: 2020-02-19 | End: 2020-02-22 | Stop reason: HOSPADM

## 2020-02-19 RX ORDER — TIZANIDINE 4 MG/1
4 TABLET ORAL NIGHTLY PRN
Status: DISCONTINUED | OUTPATIENT
Start: 2020-02-19 | End: 2020-02-19

## 2020-02-19 RX ORDER — ALPRAZOLAM 1 MG/1
1 TABLET ORAL ONCE
Status: COMPLETED | OUTPATIENT
Start: 2020-02-19 | End: 2020-02-19

## 2020-02-19 RX ORDER — DEXTROSE MONOHYDRATE 25 G/50ML
25 INJECTION, SOLUTION INTRAVENOUS
Status: DISCONTINUED | OUTPATIENT
Start: 2020-02-19 | End: 2020-02-22 | Stop reason: HOSPADM

## 2020-02-19 RX ORDER — CHOLECALCIFEROL (VITAMIN D3) 125 MCG
10 CAPSULE ORAL NIGHTLY PRN
Status: DISCONTINUED | OUTPATIENT
Start: 2020-02-19 | End: 2020-02-22 | Stop reason: HOSPADM

## 2020-02-19 RX ORDER — SODIUM CHLORIDE 9 MG/ML
100 INJECTION, SOLUTION INTRAVENOUS CONTINUOUS
Status: DISCONTINUED | OUTPATIENT
Start: 2020-02-19 | End: 2020-02-22

## 2020-02-19 RX ORDER — PROMETHAZINE HYDROCHLORIDE 25 MG/ML
12.5 INJECTION, SOLUTION INTRAMUSCULAR; INTRAVENOUS EVERY 6 HOURS PRN
Status: DISCONTINUED | OUTPATIENT
Start: 2020-02-19 | End: 2020-02-19

## 2020-02-19 RX ORDER — ATORVASTATIN CALCIUM 10 MG/1
10 TABLET, FILM COATED ORAL NIGHTLY
Status: DISCONTINUED | OUTPATIENT
Start: 2020-02-19 | End: 2020-02-22 | Stop reason: HOSPADM

## 2020-02-19 RX ORDER — ATORVASTATIN CALCIUM 10 MG/1
10 TABLET, FILM COATED ORAL DAILY
Status: DISCONTINUED | OUTPATIENT
Start: 2020-02-19 | End: 2020-02-19

## 2020-02-19 RX ORDER — SACCHAROMYCES BOULARDII 250 MG
250 CAPSULE ORAL 2 TIMES DAILY
Status: DISCONTINUED | OUTPATIENT
Start: 2020-02-19 | End: 2020-02-22 | Stop reason: HOSPADM

## 2020-02-19 RX ORDER — PROMETHAZINE HYDROCHLORIDE 12.5 MG/1
12.5 TABLET ORAL EVERY 6 HOURS PRN
Status: DISCONTINUED | OUTPATIENT
Start: 2020-02-19 | End: 2020-02-19

## 2020-02-19 RX ORDER — PANTOPRAZOLE SODIUM 40 MG/10ML
40 INJECTION, POWDER, LYOPHILIZED, FOR SOLUTION INTRAVENOUS
Status: DISCONTINUED | OUTPATIENT
Start: 2020-02-19 | End: 2020-02-22 | Stop reason: HOSPADM

## 2020-02-19 RX ORDER — PANTOPRAZOLE SODIUM 40 MG/1
40 TABLET, DELAYED RELEASE ORAL
Status: DISCONTINUED | OUTPATIENT
Start: 2020-02-19 | End: 2020-02-19

## 2020-02-19 RX ORDER — SODIUM CHLORIDE 0.9 % (FLUSH) 0.9 %
10 SYRINGE (ML) INJECTION AS NEEDED
Status: DISCONTINUED | OUTPATIENT
Start: 2020-02-19 | End: 2020-02-22 | Stop reason: HOSPADM

## 2020-02-19 RX ORDER — ONDANSETRON 4 MG/1
4 TABLET, FILM COATED ORAL EVERY 6 HOURS PRN
Status: DISCONTINUED | OUTPATIENT
Start: 2020-02-19 | End: 2020-02-22 | Stop reason: HOSPADM

## 2020-02-19 RX ADMIN — TIZANIDINE 8 MG: 4 TABLET ORAL at 21:21

## 2020-02-19 RX ADMIN — ALPRAZOLAM 0.5 MG: 0.5 TABLET ORAL at 14:32

## 2020-02-19 RX ADMIN — SODIUM CHLORIDE 100 ML/HR: 9 INJECTION, SOLUTION INTRAVENOUS at 13:26

## 2020-02-19 RX ADMIN — Medication 250 MG: at 08:56

## 2020-02-19 RX ADMIN — INSULIN LISPRO 2 UNITS: 100 INJECTION, SOLUTION INTRAVENOUS; SUBCUTANEOUS at 11:28

## 2020-02-19 RX ADMIN — METOCLOPRAMIDE 10 MG: 5 INJECTION, SOLUTION INTRAMUSCULAR; INTRAVENOUS at 11:28

## 2020-02-19 RX ADMIN — ONDANSETRON 4 MG: 2 INJECTION INTRAMUSCULAR; INTRAVENOUS at 03:46

## 2020-02-19 RX ADMIN — INSULIN LISPRO 2 UNITS: 100 INJECTION, SOLUTION INTRAVENOUS; SUBCUTANEOUS at 17:02

## 2020-02-19 RX ADMIN — TRAMADOL HYDROCHLORIDE 50 MG: 50 TABLET, FILM COATED ORAL at 14:32

## 2020-02-19 RX ADMIN — METOCLOPRAMIDE 10 MG: 5 INJECTION, SOLUTION INTRAMUSCULAR; INTRAVENOUS at 21:21

## 2020-02-19 RX ADMIN — ALPRAZOLAM 1 MG: 1 TABLET ORAL at 09:03

## 2020-02-19 RX ADMIN — AZTREONAM 2 G: 2 INJECTION, POWDER, LYOPHILIZED, FOR SOLUTION INTRAMUSCULAR; INTRAVENOUS at 00:33

## 2020-02-19 RX ADMIN — SODIUM CHLORIDE 500 ML: 9 INJECTION, SOLUTION INTRAVENOUS at 04:41

## 2020-02-19 RX ADMIN — Medication 250 MG: at 21:21

## 2020-02-19 RX ADMIN — PANTOPRAZOLE SODIUM 40 MG: 40 TABLET, DELAYED RELEASE ORAL at 05:21

## 2020-02-19 RX ADMIN — TRAMADOL HYDROCHLORIDE 50 MG: 50 TABLET, FILM COATED ORAL at 21:20

## 2020-02-19 RX ADMIN — VANCOMYCIN HYDROCHLORIDE 1500 MG: 10 INJECTION, POWDER, LYOPHILIZED, FOR SOLUTION INTRAVENOUS at 13:26

## 2020-02-19 RX ADMIN — INSULIN LISPRO 4 UNITS: 100 INJECTION, SOLUTION INTRAVENOUS; SUBCUTANEOUS at 21:25

## 2020-02-19 RX ADMIN — PROMETHAZINE HYDROCHLORIDE 12.5 MG: 25 INJECTION INTRAMUSCULAR; INTRAVENOUS at 08:57

## 2020-02-19 RX ADMIN — ALPRAZOLAM 0.5 MG: 0.5 TABLET ORAL at 03:46

## 2020-02-19 RX ADMIN — SODIUM CHLORIDE, PRESERVATIVE FREE 10 ML: 5 INJECTION INTRAVENOUS at 21:22

## 2020-02-19 RX ADMIN — PANTOPRAZOLE SODIUM 40 MG: 40 INJECTION, POWDER, FOR SOLUTION INTRAVENOUS at 17:02

## 2020-02-19 RX ADMIN — ALPRAZOLAM 0.5 MG: 0.5 TABLET ORAL at 21:20

## 2020-02-19 RX ADMIN — METOPROLOL SUCCINATE 50 MG: 50 TABLET, EXTENDED RELEASE ORAL at 21:21

## 2020-02-19 RX ADMIN — VANCOMYCIN HYDROCHLORIDE 1500 MG: 10 INJECTION, POWDER, LYOPHILIZED, FOR SOLUTION INTRAVENOUS at 23:51

## 2020-02-19 RX ADMIN — METOPROLOL TARTRATE 25 MG: 25 TABLET ORAL at 08:56

## 2020-02-19 RX ADMIN — VANCOMYCIN HYDROCHLORIDE 2750 MG: 10 INJECTION, POWDER, LYOPHILIZED, FOR SOLUTION INTRAVENOUS at 01:17

## 2020-02-19 RX ADMIN — METOCLOPRAMIDE 10 MG: 5 INJECTION, SOLUTION INTRAMUSCULAR; INTRAVENOUS at 17:02

## 2020-02-19 RX ADMIN — AZTREONAM 2 G: 2 INJECTION, POWDER, LYOPHILIZED, FOR SOLUTION INTRAMUSCULAR; INTRAVENOUS at 08:56

## 2020-02-19 RX ADMIN — AZTREONAM 2 G: 2 INJECTION, POWDER, LYOPHILIZED, FOR SOLUTION INTRAMUSCULAR; INTRAVENOUS at 16:01

## 2020-02-19 RX ADMIN — TRAMADOL HYDROCHLORIDE 50 MG: 50 TABLET, FILM COATED ORAL at 08:56

## 2020-02-19 RX ADMIN — SODIUM CHLORIDE 100 ML/HR: 9 INJECTION, SOLUTION INTRAVENOUS at 03:34

## 2020-02-19 RX ADMIN — TRAMADOL HYDROCHLORIDE 50 MG: 50 TABLET, FILM COATED ORAL at 04:40

## 2020-02-19 RX ADMIN — ATORVASTATIN CALCIUM 10 MG: 10 TABLET, FILM COATED ORAL at 21:21

## 2020-02-20 ENCOUNTER — ANESTHESIA (OUTPATIENT)
Dept: GASTROENTEROLOGY | Facility: HOSPITAL | Age: 60
End: 2020-02-20

## 2020-02-20 ENCOUNTER — ANESTHESIA EVENT (OUTPATIENT)
Dept: GASTROENTEROLOGY | Facility: HOSPITAL | Age: 60
End: 2020-02-20

## 2020-02-20 PROBLEM — K31.89 PORTAL HYPERTENSIVE GASTROPATHY: Status: ACTIVE | Noted: 2020-02-20

## 2020-02-20 PROBLEM — I85.00 VARICES OF ESOPHAGUS DETERMINED BY ENDOSCOPY (HCC): Status: ACTIVE | Noted: 2020-02-20

## 2020-02-20 PROBLEM — K76.6 PORTAL HYPERTENSIVE GASTROPATHY (HCC): Status: ACTIVE | Noted: 2020-02-20

## 2020-02-20 LAB
ADV 40+41 DNA STL QL NAA+NON-PROBE: NOT DETECTED
ANION GAP SERPL CALCULATED.3IONS-SCNC: 8 MMOL/L (ref 5–15)
ASTRO TYP 1-8 RNA STL QL NAA+NON-PROBE: NOT DETECTED
BUN BLD-MCNC: 8 MG/DL (ref 6–20)
BUN/CREAT SERPL: 8 (ref 7–25)
C CAYETANENSIS DNA STL QL NAA+NON-PROBE: NOT DETECTED
CALCIUM SPEC-SCNC: 7.9 MG/DL (ref 8.6–10.5)
CAMPY SP DNA.DIARRHEA STL QL NAA+PROBE: NOT DETECTED
CHLORIDE SERPL-SCNC: 109 MMOL/L (ref 98–107)
CO2 SERPL-SCNC: 22 MMOL/L (ref 22–29)
CREAT BLD-MCNC: 1 MG/DL (ref 0.57–1)
CRYPTOSP STL CULT: NOT DETECTED
D-LACTATE SERPL-SCNC: 1.5 MMOL/L (ref 0.5–2)
DEPRECATED RDW RBC AUTO: 53 FL (ref 37–54)
E COLI DNA SPEC QL NAA+PROBE: NOT DETECTED
E HISTOLYT AG STL-ACNC: NOT DETECTED
EAEC PAA PLAS AGGR+AATA ST NAA+NON-PRB: NOT DETECTED
EC STX1 + STX2 GENES STL NAA+PROBE: NOT DETECTED
EPEC EAE GENE STL QL NAA+NON-PROBE: NOT DETECTED
ERYTHROCYTE [DISTWIDTH] IN BLOOD BY AUTOMATED COUNT: 14.4 % (ref 12.3–15.4)
ETEC LTA+ST1A+ST1B TOX ST NAA+NON-PROBE: NOT DETECTED
G LAMBLIA DNA SPEC QL NAA+PROBE: NOT DETECTED
GFR SERPL CREATININE-BSD FRML MDRD: 57 ML/MIN/1.73
GLUCOSE BLD-MCNC: 158 MG/DL (ref 65–99)
GLUCOSE BLDC GLUCOMTR-MCNC: 115 MG/DL (ref 70–130)
GLUCOSE BLDC GLUCOMTR-MCNC: 129 MG/DL (ref 70–130)
GLUCOSE BLDC GLUCOMTR-MCNC: 179 MG/DL (ref 70–130)
GLUCOSE BLDC GLUCOMTR-MCNC: 179 MG/DL (ref 70–130)
HCT VFR BLD AUTO: 35.3 % (ref 34–46.6)
HGB BLD-MCNC: 11 G/DL (ref 12–15.9)
MCH RBC QN AUTO: 31.5 PG (ref 26.6–33)
MCHC RBC AUTO-ENTMCNC: 31.2 G/DL (ref 31.5–35.7)
MCV RBC AUTO: 101.1 FL (ref 79–97)
NOROVIRUS GI+II RNA STL QL NAA+NON-PROBE: NOT DETECTED
P SHIGELLOIDES DNA STL QL NAA+PROBE: NOT DETECTED
PLATELET # BLD AUTO: 71 10*3/MM3 (ref 140–450)
PMV BLD AUTO: 10.4 FL (ref 6–12)
POTASSIUM BLD-SCNC: 3.9 MMOL/L (ref 3.5–5.2)
RBC # BLD AUTO: 3.49 10*6/MM3 (ref 3.77–5.28)
RV RNA STL NAA+PROBE: NOT DETECTED
SALMONELLA DNA SPEC QL NAA+PROBE: NOT DETECTED
SAPO I+II+IV+V RNA STL QL NAA+NON-PROBE: NOT DETECTED
SHIGELLA SP+EIEC IPAH STL QL NAA+PROBE: NOT DETECTED
SODIUM BLD-SCNC: 139 MMOL/L (ref 136–145)
V CHOLERAE DNA SPEC QL NAA+PROBE: NOT DETECTED
VANCOMYCIN TROUGH SERPL-MCNC: 17.6 MCG/ML (ref 5–20)
VIBRIO DNA SPEC NAA+PROBE: NOT DETECTED
WBC NRBC COR # BLD: 2.81 10*3/MM3 (ref 3.4–10.8)
YERSINIA STL CULT: NOT DETECTED

## 2020-02-20 PROCEDURE — 83605 ASSAY OF LACTIC ACID: CPT | Performed by: PHYSICIAN ASSISTANT

## 2020-02-20 PROCEDURE — 99233 SBSQ HOSP IP/OBS HIGH 50: CPT | Performed by: NURSE PRACTITIONER

## 2020-02-20 PROCEDURE — 85027 COMPLETE CBC AUTOMATED: CPT | Performed by: PHYSICIAN ASSISTANT

## 2020-02-20 PROCEDURE — 25010000002 VANCOMYCIN 10 G RECONSTITUTED SOLUTION

## 2020-02-20 PROCEDURE — 80202 ASSAY OF VANCOMYCIN: CPT | Performed by: INTERNAL MEDICINE

## 2020-02-20 PROCEDURE — 80048 BASIC METABOLIC PNL TOTAL CA: CPT | Performed by: PHYSICIAN ASSISTANT

## 2020-02-20 PROCEDURE — 25010000002 ONDANSETRON PER 1 MG: Performed by: INTERNAL MEDICINE

## 2020-02-20 PROCEDURE — 82962 GLUCOSE BLOOD TEST: CPT

## 2020-02-20 PROCEDURE — 25010000002 PROPOFOL 10 MG/ML EMULSION: Performed by: NURSE ANESTHETIST, CERTIFIED REGISTERED

## 2020-02-20 PROCEDURE — 25010000002 METOCLOPRAMIDE PER 10 MG: Performed by: PHYSICIAN ASSISTANT

## 2020-02-20 PROCEDURE — 0DJ08ZZ INSPECTION OF UPPER INTESTINAL TRACT, VIA NATURAL OR ARTIFICIAL OPENING ENDOSCOPIC: ICD-10-PCS | Performed by: INTERNAL MEDICINE

## 2020-02-20 RX ORDER — PEG-3350, SODIUM SULFATE, SODIUM CHLORIDE, POTASSIUM CHLORIDE, SODIUM ASCORBATE AND ASCORBIC ACID 7.5-2.691G
1000 KIT ORAL
Status: COMPLETED | OUTPATIENT
Start: 2020-02-21 | End: 2020-02-21

## 2020-02-20 RX ORDER — LABETALOL HYDROCHLORIDE 5 MG/ML
5 INJECTION, SOLUTION INTRAVENOUS
Status: DISCONTINUED | OUTPATIENT
Start: 2020-02-20 | End: 2020-02-20 | Stop reason: HOSPADM

## 2020-02-20 RX ORDER — PEG-3350, SODIUM SULFATE, SODIUM CHLORIDE, POTASSIUM CHLORIDE, SODIUM ASCORBATE AND ASCORBIC ACID 7.5-2.691G
1000 KIT ORAL
Status: COMPLETED | OUTPATIENT
Start: 2020-02-20 | End: 2020-02-20

## 2020-02-20 RX ORDER — SODIUM CHLORIDE 9 MG/ML
10 INJECTION, SOLUTION INTRAVENOUS CONTINUOUS
Status: DISCONTINUED | OUTPATIENT
Start: 2020-02-20 | End: 2020-02-22

## 2020-02-20 RX ORDER — LIDOCAINE HYDROCHLORIDE 10 MG/ML
0.5 INJECTION, SOLUTION EPIDURAL; INFILTRATION; INTRACAUDAL; PERINEURAL ONCE AS NEEDED
Status: DISCONTINUED | OUTPATIENT
Start: 2020-02-20 | End: 2020-02-20 | Stop reason: HOSPADM

## 2020-02-20 RX ORDER — HYDRALAZINE HYDROCHLORIDE 20 MG/ML
5 INJECTION INTRAMUSCULAR; INTRAVENOUS
Status: DISCONTINUED | OUTPATIENT
Start: 2020-02-20 | End: 2020-02-20 | Stop reason: HOSPADM

## 2020-02-20 RX ORDER — ZOLPIDEM TARTRATE 5 MG/1
5 TABLET ORAL NIGHTLY PRN
Status: DISCONTINUED | OUTPATIENT
Start: 2020-02-20 | End: 2020-02-22 | Stop reason: HOSPADM

## 2020-02-20 RX ORDER — PROPOFOL 10 MG/ML
VIAL (ML) INTRAVENOUS AS NEEDED
Status: DISCONTINUED | OUTPATIENT
Start: 2020-02-20 | End: 2020-02-20 | Stop reason: SURG

## 2020-02-20 RX ORDER — FAMOTIDINE 10 MG/ML
20 INJECTION, SOLUTION INTRAVENOUS ONCE
Status: COMPLETED | OUTPATIENT
Start: 2020-02-20 | End: 2020-02-20

## 2020-02-20 RX ORDER — LIDOCAINE HYDROCHLORIDE 10 MG/ML
INJECTION, SOLUTION EPIDURAL; INFILTRATION; INTRACAUDAL; PERINEURAL AS NEEDED
Status: DISCONTINUED | OUTPATIENT
Start: 2020-02-20 | End: 2020-02-20 | Stop reason: SURG

## 2020-02-20 RX ORDER — ONDANSETRON 2 MG/ML
4 INJECTION INTRAMUSCULAR; INTRAVENOUS ONCE AS NEEDED
Status: DISCONTINUED | OUTPATIENT
Start: 2020-02-20 | End: 2020-02-20 | Stop reason: HOSPADM

## 2020-02-20 RX ORDER — SODIUM CHLORIDE 0.9 % (FLUSH) 0.9 %
10 SYRINGE (ML) INJECTION EVERY 12 HOURS SCHEDULED
Status: DISCONTINUED | OUTPATIENT
Start: 2020-02-20 | End: 2020-02-20 | Stop reason: HOSPADM

## 2020-02-20 RX ORDER — IPRATROPIUM BROMIDE AND ALBUTEROL SULFATE 2.5; .5 MG/3ML; MG/3ML
3 SOLUTION RESPIRATORY (INHALATION) ONCE AS NEEDED
Status: DISCONTINUED | OUTPATIENT
Start: 2020-02-20 | End: 2020-02-20 | Stop reason: HOSPADM

## 2020-02-20 RX ORDER — PROMETHAZINE HYDROCHLORIDE 25 MG/1
25 SUPPOSITORY RECTAL ONCE AS NEEDED
Status: DISCONTINUED | OUTPATIENT
Start: 2020-02-20 | End: 2020-02-20 | Stop reason: HOSPADM

## 2020-02-20 RX ORDER — SODIUM CHLORIDE 0.9 % (FLUSH) 0.9 %
10 SYRINGE (ML) INJECTION AS NEEDED
Status: DISCONTINUED | OUTPATIENT
Start: 2020-02-20 | End: 2020-02-20 | Stop reason: HOSPADM

## 2020-02-20 RX ORDER — PROMETHAZINE HYDROCHLORIDE 25 MG/ML
6.25 INJECTION, SOLUTION INTRAMUSCULAR; INTRAVENOUS ONCE AS NEEDED
Status: DISCONTINUED | OUTPATIENT
Start: 2020-02-20 | End: 2020-02-20 | Stop reason: HOSPADM

## 2020-02-20 RX ORDER — SODIUM CHLORIDE, SODIUM LACTATE, POTASSIUM CHLORIDE, CALCIUM CHLORIDE 600; 310; 30; 20 MG/100ML; MG/100ML; MG/100ML; MG/100ML
9 INJECTION, SOLUTION INTRAVENOUS CONTINUOUS
Status: DISCONTINUED | OUTPATIENT
Start: 2020-02-20 | End: 2020-02-22

## 2020-02-20 RX ORDER — PROMETHAZINE HYDROCHLORIDE 25 MG/1
25 TABLET ORAL ONCE AS NEEDED
Status: DISCONTINUED | OUTPATIENT
Start: 2020-02-20 | End: 2020-02-20 | Stop reason: HOSPADM

## 2020-02-20 RX ADMIN — PROPOFOL 120 MG: 10 INJECTION, EMULSION INTRAVENOUS at 09:37

## 2020-02-20 RX ADMIN — INSULIN LISPRO 2 UNITS: 100 INJECTION, SOLUTION INTRAVENOUS; SUBCUTANEOUS at 19:48

## 2020-02-20 RX ADMIN — AZTREONAM 2 G: 2 INJECTION, POWDER, LYOPHILIZED, FOR SOLUTION INTRAMUSCULAR; INTRAVENOUS at 15:37

## 2020-02-20 RX ADMIN — FAMOTIDINE 20 MG: 10 INJECTION INTRAVENOUS at 08:55

## 2020-02-20 RX ADMIN — MELATONIN TAB 5 MG 10 MG: 5 TAB at 23:49

## 2020-02-20 RX ADMIN — ALPRAZOLAM 0.5 MG: 0.5 TABLET ORAL at 23:49

## 2020-02-20 RX ADMIN — TOPICAL ANESTHETIC 1 SPRAY: 200 SPRAY DENTAL; PERIODONTAL at 09:25

## 2020-02-20 RX ADMIN — METOPROLOL SUCCINATE 50 MG: 50 TABLET, EXTENDED RELEASE ORAL at 19:39

## 2020-02-20 RX ADMIN — INSULIN LISPRO 2 UNITS: 100 INJECTION, SOLUTION INTRAVENOUS; SUBCUTANEOUS at 17:18

## 2020-02-20 RX ADMIN — SODIUM CHLORIDE, PRESERVATIVE FREE 10 ML: 5 INJECTION INTRAVENOUS at 10:19

## 2020-02-20 RX ADMIN — AZTREONAM 2 G: 2 INJECTION, POWDER, LYOPHILIZED, FOR SOLUTION INTRAMUSCULAR; INTRAVENOUS at 01:13

## 2020-02-20 RX ADMIN — Medication 250 MG: at 19:38

## 2020-02-20 RX ADMIN — AZTREONAM 2 G: 2 INJECTION, POWDER, LYOPHILIZED, FOR SOLUTION INTRAMUSCULAR; INTRAVENOUS at 23:50

## 2020-02-20 RX ADMIN — VENLAFAXINE HYDROCHLORIDE 225 MG: 75 CAPSULE, EXTENDED RELEASE ORAL at 10:17

## 2020-02-20 RX ADMIN — ATORVASTATIN CALCIUM 10 MG: 10 TABLET, FILM COATED ORAL at 19:38

## 2020-02-20 RX ADMIN — Medication 250 MG: at 10:17

## 2020-02-20 RX ADMIN — SODIUM CHLORIDE 10 ML/HR: 9 INJECTION, SOLUTION INTRAVENOUS at 08:55

## 2020-02-20 RX ADMIN — TIZANIDINE 8 MG: 4 TABLET ORAL at 19:38

## 2020-02-20 RX ADMIN — AZTREONAM 2 G: 2 INJECTION, POWDER, LYOPHILIZED, FOR SOLUTION INTRAMUSCULAR; INTRAVENOUS at 10:18

## 2020-02-20 RX ADMIN — PANTOPRAZOLE SODIUM 40 MG: 40 INJECTION, POWDER, FOR SOLUTION INTRAVENOUS at 10:18

## 2020-02-20 RX ADMIN — LIDOCAINE HYDROCHLORIDE 100 MG: 10 INJECTION, SOLUTION EPIDURAL; INFILTRATION; INTRACAUDAL; PERINEURAL at 09:37

## 2020-02-20 RX ADMIN — ONDANSETRON 4 MG: 2 INJECTION INTRAMUSCULAR; INTRAVENOUS at 19:38

## 2020-02-20 RX ADMIN — PANTOPRAZOLE SODIUM 40 MG: 40 INJECTION, POWDER, FOR SOLUTION INTRAVENOUS at 17:04

## 2020-02-20 RX ADMIN — POLYETHYLENE GLYCOL 3350, SODIUM SULFATE, SODIUM CHLORIDE, POTASSIUM CHLORIDE, ASCORBIC ACID, SODIUM ASCORBATE 1000 ML: KIT at 18:24

## 2020-02-20 RX ADMIN — METOCLOPRAMIDE 10 MG: 5 INJECTION, SOLUTION INTRAMUSCULAR; INTRAVENOUS at 08:17

## 2020-02-20 RX ADMIN — TOPICAL ANESTHETIC 1 SPRAY: 200 SPRAY DENTAL; PERIODONTAL at 09:26

## 2020-02-20 RX ADMIN — TRAMADOL HYDROCHLORIDE 50 MG: 50 TABLET, FILM COATED ORAL at 19:44

## 2020-02-20 RX ADMIN — VANCOMYCIN HYDROCHLORIDE 1250 MG: 10 INJECTION, POWDER, LYOPHILIZED, FOR SOLUTION INTRAVENOUS at 14:08

## 2020-02-20 NOTE — ANESTHESIA POSTPROCEDURE EVALUATION
Patient: Jeevan Cardoso    Procedure Summary     Date:  02/20/20 Room / Location:  [facility] ENDOSCOPY 1 /  [facility]    Anesthesia Start:  0928 Anesthesia Stop:  0947    Procedure:  ESOPHAGOGASTRODUODENOSCOPY (N/A ) Diagnosis:       Hematemesis with nausea      (Hematemesis with nausea [K92.0])    Surgeon:  Brunner, Mark I, MD Provider:  Matt Rashid MD    Anesthesia Type:  general ASA Status:  3          Anesthesia Type: general    Vitals  No vitals data found for the desired time range.          Post Anesthesia Care and Evaluation    Patient location during evaluation: PACU  Patient participation: complete - patient participated  Level of consciousness: awake and alert  Pain score: 0  Pain management: adequate  Airway patency: patent  Anesthetic complications: No anesthetic complications  PONV Status: none  Cardiovascular status: hemodynamically stable and acceptable  Respiratory status: nonlabored ventilation, acceptable and nasal cannula  Hydration status: acceptable

## 2020-02-20 NOTE — ANESTHESIA PREPROCEDURE EVALUATION
Anesthesia Evaluation                  Airway   Mallampati: I  TM distance: >3 FB  Neck ROM: full  No difficulty expected  Dental      Pulmonary    (+) sleep apnea,   Cardiovascular     ECG reviewed    (+) hypertension,       Neuro/Psych  (+) headaches, dizziness/light headedness,     GI/Hepatic/Renal/Endo    (+) obesity,  GERD,  hepatitis, liver disease, diabetes mellitus,     Musculoskeletal     Abdominal    Substance History      OB/GYN          Other                        Anesthesia Plan    ASA 3     general     intravenous induction     Anesthetic plan, all risks, benefits, and alternatives have been provided, discussed and informed consent has been obtained with: patient.    Plan discussed with CRNA.

## 2020-02-21 ENCOUNTER — ANESTHESIA (OUTPATIENT)
Dept: GASTROENTEROLOGY | Facility: HOSPITAL | Age: 60
End: 2020-02-21

## 2020-02-21 ENCOUNTER — ANESTHESIA EVENT (OUTPATIENT)
Dept: GASTROENTEROLOGY | Facility: HOSPITAL | Age: 60
End: 2020-02-21

## 2020-02-21 PROBLEM — R93.3 ABNORMAL CT SCAN, COLON: Status: ACTIVE | Noted: 2020-02-18

## 2020-02-21 PROBLEM — K59.1 FUNCTIONAL DIARRHEA: Status: ACTIVE | Noted: 2020-02-18

## 2020-02-21 PROBLEM — R10.31 RLQ ABDOMINAL PAIN: Status: ACTIVE | Noted: 2020-02-18

## 2020-02-21 LAB
ALBUMIN SERPL-MCNC: 3 G/DL (ref 3.5–5.2)
ALBUMIN/GLOB SERPL: 0.8 G/DL
ALP SERPL-CCNC: 78 U/L (ref 39–117)
ALT SERPL W P-5'-P-CCNC: 15 U/L (ref 1–33)
ANION GAP SERPL CALCULATED.3IONS-SCNC: 9 MMOL/L (ref 5–15)
AST SERPL-CCNC: 31 U/L (ref 1–32)
BASOPHILS # BLD AUTO: 0.01 10*3/MM3 (ref 0–0.2)
BASOPHILS NFR BLD AUTO: 0.3 % (ref 0–1.5)
BILIRUB SERPL-MCNC: 1.1 MG/DL (ref 0.2–1.2)
BUN BLD-MCNC: 11 MG/DL (ref 6–20)
BUN/CREAT SERPL: 9.5 (ref 7–25)
CALCIUM SPEC-SCNC: 8.4 MG/DL (ref 8.6–10.5)
CHLORIDE SERPL-SCNC: 109 MMOL/L (ref 98–107)
CO2 SERPL-SCNC: 23 MMOL/L (ref 22–29)
CREAT BLD-MCNC: 1.16 MG/DL (ref 0.57–1)
DEPRECATED RDW RBC AUTO: 53.4 FL (ref 37–54)
EOSINOPHIL # BLD AUTO: 0.13 10*3/MM3 (ref 0–0.4)
EOSINOPHIL NFR BLD AUTO: 4.4 % (ref 0.3–6.2)
ERYTHROCYTE [DISTWIDTH] IN BLOOD BY AUTOMATED COUNT: 14.2 % (ref 12.3–15.4)
GFR SERPL CREATININE-BSD FRML MDRD: 48 ML/MIN/1.73
GLOBULIN UR ELPH-MCNC: 3.6 GM/DL
GLUCOSE BLD-MCNC: 124 MG/DL (ref 65–99)
GLUCOSE BLDC GLUCOMTR-MCNC: 101 MG/DL (ref 70–130)
GLUCOSE BLDC GLUCOMTR-MCNC: 102 MG/DL (ref 70–130)
GLUCOSE BLDC GLUCOMTR-MCNC: 105 MG/DL (ref 70–130)
GLUCOSE BLDC GLUCOMTR-MCNC: 124 MG/DL (ref 70–130)
GLUCOSE BLDC GLUCOMTR-MCNC: 177 MG/DL (ref 70–130)
HCT VFR BLD AUTO: 38.7 % (ref 34–46.6)
HGB BLD-MCNC: 12 G/DL (ref 12–15.9)
IMM GRANULOCYTES # BLD AUTO: 0.01 10*3/MM3 (ref 0–0.05)
IMM GRANULOCYTES NFR BLD AUTO: 0.3 % (ref 0–0.5)
LYMPHOCYTES # BLD AUTO: 0.99 10*3/MM3 (ref 0.7–3.1)
LYMPHOCYTES NFR BLD AUTO: 33.3 % (ref 19.6–45.3)
MCH RBC QN AUTO: 31.6 PG (ref 26.6–33)
MCHC RBC AUTO-ENTMCNC: 31 G/DL (ref 31.5–35.7)
MCV RBC AUTO: 101.8 FL (ref 79–97)
MONOCYTES # BLD AUTO: 0.35 10*3/MM3 (ref 0.1–0.9)
MONOCYTES NFR BLD AUTO: 11.8 % (ref 5–12)
NEUTROPHILS # BLD AUTO: 1.48 10*3/MM3 (ref 1.7–7)
NEUTROPHILS NFR BLD AUTO: 49.9 % (ref 42.7–76)
NRBC BLD AUTO-RTO: 0 /100 WBC (ref 0–0.2)
PLATELET # BLD AUTO: 62 10*3/MM3 (ref 140–450)
PMV BLD AUTO: 9.9 FL (ref 6–12)
POTASSIUM BLD-SCNC: 3.8 MMOL/L (ref 3.5–5.2)
PROT SERPL-MCNC: 6.6 G/DL (ref 6–8.5)
RBC # BLD AUTO: 3.8 10*6/MM3 (ref 3.77–5.28)
SODIUM BLD-SCNC: 141 MMOL/L (ref 136–145)
WBC NRBC COR # BLD: 2.97 10*3/MM3 (ref 3.4–10.8)

## 2020-02-21 PROCEDURE — 0DBK8ZX EXCISION OF ASCENDING COLON, VIA NATURAL OR ARTIFICIAL OPENING ENDOSCOPIC, DIAGNOSTIC: ICD-10-PCS | Performed by: INTERNAL MEDICINE

## 2020-02-21 PROCEDURE — 25010000002 VANCOMYCIN 10 G RECONSTITUTED SOLUTION

## 2020-02-21 PROCEDURE — 85025 COMPLETE CBC W/AUTO DIFF WBC: CPT | Performed by: NURSE PRACTITIONER

## 2020-02-21 PROCEDURE — 82962 GLUCOSE BLOOD TEST: CPT

## 2020-02-21 PROCEDURE — 0DBH8ZX EXCISION OF CECUM, VIA NATURAL OR ARTIFICIAL OPENING ENDOSCOPIC, DIAGNOSTIC: ICD-10-PCS | Performed by: INTERNAL MEDICINE

## 2020-02-21 PROCEDURE — 80053 COMPREHEN METABOLIC PANEL: CPT | Performed by: NURSE PRACTITIONER

## 2020-02-21 PROCEDURE — 99232 SBSQ HOSP IP/OBS MODERATE 35: CPT | Performed by: NURSE PRACTITIONER

## 2020-02-21 PROCEDURE — 25010000002 ONDANSETRON PER 1 MG: Performed by: INTERNAL MEDICINE

## 2020-02-21 PROCEDURE — 88305 TISSUE EXAM BY PATHOLOGIST: CPT | Performed by: INTERNAL MEDICINE

## 2020-02-21 PROCEDURE — 25010000002 PROPOFOL 10 MG/ML EMULSION: Performed by: NURSE ANESTHETIST, CERTIFIED REGISTERED

## 2020-02-21 PROCEDURE — 25010000002 VANCOMYCIN 10 G RECONSTITUTED SOLUTION: Performed by: INTERNAL MEDICINE

## 2020-02-21 RX ORDER — PROMETHAZINE HYDROCHLORIDE 25 MG/1
25 SUPPOSITORY RECTAL ONCE AS NEEDED
Status: DISCONTINUED | OUTPATIENT
Start: 2020-02-21 | End: 2020-02-21 | Stop reason: HOSPADM

## 2020-02-21 RX ORDER — MEPERIDINE HYDROCHLORIDE 50 MG/ML
12.5 INJECTION INTRAMUSCULAR; INTRAVENOUS; SUBCUTANEOUS
Status: DISCONTINUED | OUTPATIENT
Start: 2020-02-21 | End: 2020-02-21 | Stop reason: HOSPADM

## 2020-02-21 RX ORDER — SODIUM CHLORIDE 0.9 % (FLUSH) 0.9 %
3 SYRINGE (ML) INJECTION EVERY 12 HOURS SCHEDULED
Status: DISCONTINUED | OUTPATIENT
Start: 2020-02-21 | End: 2020-02-21 | Stop reason: HOSPADM

## 2020-02-21 RX ORDER — SODIUM CHLORIDE 0.9 % (FLUSH) 0.9 %
3-10 SYRINGE (ML) INJECTION AS NEEDED
Status: DISCONTINUED | OUTPATIENT
Start: 2020-02-21 | End: 2020-02-21 | Stop reason: HOSPADM

## 2020-02-21 RX ORDER — HYDRALAZINE HYDROCHLORIDE 20 MG/ML
5 INJECTION INTRAMUSCULAR; INTRAVENOUS
Status: DISCONTINUED | OUTPATIENT
Start: 2020-02-21 | End: 2020-02-21 | Stop reason: HOSPADM

## 2020-02-21 RX ORDER — PROMETHAZINE HYDROCHLORIDE 25 MG/1
25 TABLET ORAL ONCE AS NEEDED
Status: DISCONTINUED | OUTPATIENT
Start: 2020-02-21 | End: 2020-02-21 | Stop reason: HOSPADM

## 2020-02-21 RX ORDER — HYDROMORPHONE HYDROCHLORIDE 1 MG/ML
0.5 INJECTION, SOLUTION INTRAMUSCULAR; INTRAVENOUS; SUBCUTANEOUS
Status: DISCONTINUED | OUTPATIENT
Start: 2020-02-21 | End: 2020-02-21 | Stop reason: HOSPADM

## 2020-02-21 RX ORDER — PROPOFOL 10 MG/ML
VIAL (ML) INTRAVENOUS AS NEEDED
Status: DISCONTINUED | OUTPATIENT
Start: 2020-02-21 | End: 2020-02-21 | Stop reason: SURG

## 2020-02-21 RX ORDER — NALOXONE HCL 0.4 MG/ML
0.4 VIAL (ML) INJECTION AS NEEDED
Status: DISCONTINUED | OUTPATIENT
Start: 2020-02-21 | End: 2020-02-21 | Stop reason: HOSPADM

## 2020-02-21 RX ORDER — ONDANSETRON 2 MG/ML
4 INJECTION INTRAMUSCULAR; INTRAVENOUS ONCE AS NEEDED
Status: DISCONTINUED | OUTPATIENT
Start: 2020-02-21 | End: 2020-02-21 | Stop reason: HOSPADM

## 2020-02-21 RX ORDER — PROMETHAZINE HYDROCHLORIDE 25 MG/ML
6.25 INJECTION, SOLUTION INTRAMUSCULAR; INTRAVENOUS ONCE AS NEEDED
Status: DISCONTINUED | OUTPATIENT
Start: 2020-02-21 | End: 2020-02-21 | Stop reason: HOSPADM

## 2020-02-21 RX ORDER — LABETALOL HYDROCHLORIDE 5 MG/ML
5 INJECTION, SOLUTION INTRAVENOUS
Status: DISCONTINUED | OUTPATIENT
Start: 2020-02-21 | End: 2020-02-21 | Stop reason: HOSPADM

## 2020-02-21 RX ORDER — IPRATROPIUM BROMIDE AND ALBUTEROL SULFATE 2.5; .5 MG/3ML; MG/3ML
3 SOLUTION RESPIRATORY (INHALATION) ONCE AS NEEDED
Status: DISCONTINUED | OUTPATIENT
Start: 2020-02-21 | End: 2020-02-21 | Stop reason: HOSPADM

## 2020-02-21 RX ORDER — FENTANYL CITRATE 50 UG/ML
50 INJECTION, SOLUTION INTRAMUSCULAR; INTRAVENOUS
Status: DISCONTINUED | OUTPATIENT
Start: 2020-02-21 | End: 2020-02-21 | Stop reason: HOSPADM

## 2020-02-21 RX ORDER — SODIUM CHLORIDE 9 MG/ML
50 INJECTION, SOLUTION INTRAVENOUS CONTINUOUS
Status: DISCONTINUED | OUTPATIENT
Start: 2020-02-21 | End: 2020-02-22

## 2020-02-21 RX ORDER — HYDROCODONE BITARTRATE AND ACETAMINOPHEN 5; 325 MG/1; MG/1
1 TABLET ORAL ONCE AS NEEDED
Status: DISCONTINUED | OUTPATIENT
Start: 2020-02-21 | End: 2020-02-21 | Stop reason: HOSPADM

## 2020-02-21 RX ADMIN — VENLAFAXINE HYDROCHLORIDE 225 MG: 75 CAPSULE, EXTENDED RELEASE ORAL at 07:56

## 2020-02-21 RX ADMIN — PANTOPRAZOLE SODIUM 40 MG: 40 INJECTION, POWDER, FOR SOLUTION INTRAVENOUS at 07:56

## 2020-02-21 RX ADMIN — SODIUM CHLORIDE 50 ML/HR: 9 INJECTION, SOLUTION INTRAVENOUS at 13:17

## 2020-02-21 RX ADMIN — MELATONIN TAB 5 MG 10 MG: 5 TAB at 21:40

## 2020-02-21 RX ADMIN — ALPRAZOLAM 0.5 MG: 0.5 TABLET ORAL at 21:40

## 2020-02-21 RX ADMIN — Medication 250 MG: at 20:16

## 2020-02-21 RX ADMIN — PROPOFOL 150 MCG/KG/MIN: 10 INJECTION, EMULSION INTRAVENOUS at 13:48

## 2020-02-21 RX ADMIN — PROPOFOL 60 MG: 10 INJECTION, EMULSION INTRAVENOUS at 13:48

## 2020-02-21 RX ADMIN — SODIUM CHLORIDE 50 ML/HR: 9 INJECTION, SOLUTION INTRAVENOUS at 23:58

## 2020-02-21 RX ADMIN — AZTREONAM 2 G: 2 INJECTION, POWDER, LYOPHILIZED, FOR SOLUTION INTRAMUSCULAR; INTRAVENOUS at 07:56

## 2020-02-21 RX ADMIN — METOPROLOL SUCCINATE 50 MG: 50 TABLET, EXTENDED RELEASE ORAL at 20:16

## 2020-02-21 RX ADMIN — ONDANSETRON 4 MG: 2 INJECTION INTRAMUSCULAR; INTRAVENOUS at 08:07

## 2020-02-21 RX ADMIN — SODIUM CHLORIDE, PRESERVATIVE FREE 10 ML: 5 INJECTION INTRAVENOUS at 20:17

## 2020-02-21 RX ADMIN — AZTREONAM 2 G: 2 INJECTION, POWDER, LYOPHILIZED, FOR SOLUTION INTRAMUSCULAR; INTRAVENOUS at 17:02

## 2020-02-21 RX ADMIN — VANCOMYCIN HYDROCHLORIDE 1250 MG: 10 INJECTION, POWDER, LYOPHILIZED, FOR SOLUTION INTRAVENOUS at 01:35

## 2020-02-21 RX ADMIN — SODIUM CHLORIDE, PRESERVATIVE FREE 10 ML: 5 INJECTION INTRAVENOUS at 08:12

## 2020-02-21 RX ADMIN — INSULIN LISPRO 2 UNITS: 100 INJECTION, SOLUTION INTRAVENOUS; SUBCUTANEOUS at 20:17

## 2020-02-21 RX ADMIN — POLYETHYLENE GLYCOL 3350, SODIUM SULFATE, SODIUM CHLORIDE, POTASSIUM CHLORIDE, ASCORBIC ACID, SODIUM ASCORBATE 1000 ML: KIT at 04:35

## 2020-02-21 RX ADMIN — VANCOMYCIN HYDROCHLORIDE 1250 MG: 10 INJECTION, POWDER, LYOPHILIZED, FOR SOLUTION INTRAVENOUS at 15:08

## 2020-02-21 RX ADMIN — TRAMADOL HYDROCHLORIDE 50 MG: 50 TABLET, FILM COATED ORAL at 15:12

## 2020-02-21 RX ADMIN — PANTOPRAZOLE SODIUM 40 MG: 40 INJECTION, POWDER, FOR SOLUTION INTRAVENOUS at 17:11

## 2020-02-21 RX ADMIN — TIZANIDINE 8 MG: 4 TABLET ORAL at 20:16

## 2020-02-21 RX ADMIN — ATORVASTATIN CALCIUM 10 MG: 10 TABLET, FILM COATED ORAL at 20:17

## 2020-02-21 RX ADMIN — Medication 250 MG: at 08:00

## 2020-02-21 NOTE — ANESTHESIA POSTPROCEDURE EVALUATION
Patient: Jeevan Cardoso    Procedure Summary     Date:  02/21/20 Room / Location:   [facility] /  [facility]    Anesthesia Start:  1344 Anesthesia Stop:  1422    Procedure:  COLONOSCOPY (N/A ) Diagnosis:       Functional diarrhea      RLQ abdominal pain      Abnormal CT scan, colon      (Functional diarrhea [K59.1])      (RLQ abdominal pain [R10.31])      (Abnormal CT scan, colon [R93.3])    Surgeon:  Brunner, Mark I, MD Provider:  Amish Keller MD    Anesthesia Type:  general ASA Status:  3          Anesthesia Type: general    Vitals  No vitals data found for the desired time range.          Post Anesthesia Care and Evaluation    Patient location during evaluation: PACU  Patient participation: complete - patient participated  Level of consciousness: sleepy but conscious  Pain score: 0  Pain management: adequate  Airway patency: patent  Anesthetic complications: No anesthetic complications  PONV Status: none  Cardiovascular status: hemodynamically stable and acceptable  Respiratory status: nonlabored ventilation, acceptable and nasal cannula  Hydration status: acceptable

## 2020-02-21 NOTE — ANESTHESIA PREPROCEDURE EVALUATION
Anesthesia Evaluation     Patient summary reviewed and Nursing notes reviewed                Airway   Mallampati: II  TM distance: >3 FB  Neck ROM: full  No difficulty expected  Dental - normal exam     Pulmonary - normal exam   (+) sleep apnea,   Cardiovascular - normal exam    (+) hypertension,       Neuro/Psych  (+) headaches, dizziness/light headedness, psychiatric history Anxiety,     GI/Hepatic/Renal/Endo    (+) morbid obesity, GERD, GI bleeding , hepatitis, liver disease fatty liver disease, diabetes mellitus,     Musculoskeletal     Abdominal  - normal exam    Bowel sounds: normal.   Substance History - negative use     OB/GYN negative ob/gyn ROS         Other   arthritis, blood dyscrasia (ITP),                     Anesthesia Plan    ASA 3     general   (PROPOFOL)  intravenous induction     Anesthetic plan, all risks, benefits, and alternatives have been provided, discussed and informed consent has been obtained with: patient.    Plan discussed with CRNA.

## 2020-02-22 VITALS
HEART RATE: 68 BPM | DIASTOLIC BLOOD PRESSURE: 57 MMHG | OXYGEN SATURATION: 96 % | HEIGHT: 63 IN | TEMPERATURE: 97.9 F | BODY MASS INDEX: 44.65 KG/M2 | SYSTOLIC BLOOD PRESSURE: 115 MMHG | RESPIRATION RATE: 18 BRPM | WEIGHT: 252 LBS

## 2020-02-22 PROBLEM — E87.20 LACTIC ACIDOSIS: Status: RESOLVED | Noted: 2020-02-19 | Resolved: 2020-02-22

## 2020-02-22 PROBLEM — K31.89 PORTAL HYPERTENSIVE GASTROPATHY: Chronic | Status: ACTIVE | Noted: 2020-02-20

## 2020-02-22 PROBLEM — R19.7 ACUTE DIARRHEA: Status: RESOLVED | Noted: 2020-02-19 | Resolved: 2020-02-22

## 2020-02-22 PROBLEM — K21.9 GERD WITHOUT ESOPHAGITIS: Chronic | Status: ACTIVE | Noted: 2020-02-19

## 2020-02-22 PROBLEM — E66.813 OBESITY, CLASS III, BMI 40-49.9 (MORBID OBESITY): Chronic | Status: ACTIVE | Noted: 2017-03-17

## 2020-02-22 PROBLEM — K92.0 HEMATEMESIS WITH NAUSEA: Status: RESOLVED | Noted: 2020-02-18 | Resolved: 2020-02-22

## 2020-02-22 PROBLEM — I85.00 VARICES OF ESOPHAGUS DETERMINED BY ENDOSCOPY: Chronic | Status: ACTIVE | Noted: 2020-02-20

## 2020-02-22 PROBLEM — K76.6 PORTAL HYPERTENSIVE GASTROPATHY: Chronic | Status: ACTIVE | Noted: 2020-02-20

## 2020-02-22 PROBLEM — Z86.69 HISTORY OF MIGRAINE HEADACHES: Chronic | Status: ACTIVE | Noted: 2020-02-19

## 2020-02-22 PROBLEM — K75.81 LIVER CIRRHOSIS SECONDARY TO NASH: Chronic | Status: ACTIVE | Noted: 2020-02-19

## 2020-02-22 PROBLEM — E11.9 TYPE 2 DIABETES MELLITUS: Chronic | Status: ACTIVE | Noted: 2020-02-19

## 2020-02-22 PROBLEM — G25.81 RESTLESS LEGS SYNDROME (RLS): Chronic | Status: ACTIVE | Noted: 2017-03-17

## 2020-02-22 PROBLEM — K59.1 FUNCTIONAL DIARRHEA: Chronic | Status: ACTIVE | Noted: 2020-02-18

## 2020-02-22 PROBLEM — K74.60 LIVER CIRRHOSIS SECONDARY TO NASH: Chronic | Status: ACTIVE | Noted: 2020-02-19

## 2020-02-22 PROBLEM — E11.9 TYPE 2 DIABETES MELLITUS (HCC): Chronic | Status: RESOLVED | Noted: 2020-02-19 | Resolved: 2020-02-19

## 2020-02-22 PROBLEM — F41.1 GENERALIZED ANXIETY DISORDER: Chronic | Status: ACTIVE | Noted: 2020-02-19

## 2020-02-22 PROBLEM — G47.33 OSA (OBSTRUCTIVE SLEEP APNEA): Chronic | Status: ACTIVE | Noted: 2020-02-19

## 2020-02-22 PROBLEM — K52.9 COLITIS: Status: RESOLVED | Noted: 2020-02-19 | Resolved: 2020-02-22

## 2020-02-22 PROBLEM — I10 ESSENTIAL HYPERTENSION: Chronic | Status: ACTIVE | Noted: 2020-02-19

## 2020-02-22 PROBLEM — E11.9 TYPE 2 DIABETES MELLITUS WITHOUT COMPLICATION, WITHOUT LONG-TERM CURRENT USE OF INSULIN: Chronic | Status: ACTIVE | Noted: 2020-02-19

## 2020-02-22 PROBLEM — E66.01 OBESITY, CLASS III, BMI 40-49.9 (MORBID OBESITY): Chronic | Status: ACTIVE | Noted: 2017-03-17

## 2020-02-22 LAB
ANION GAP SERPL CALCULATED.3IONS-SCNC: 8 MMOL/L (ref 5–15)
BASOPHILS # BLD AUTO: 0.02 10*3/MM3 (ref 0–0.2)
BASOPHILS NFR BLD AUTO: 0.6 % (ref 0–1.5)
BUN BLD-MCNC: 14 MG/DL (ref 6–20)
BUN/CREAT SERPL: 13.3 (ref 7–25)
CALCIUM SPEC-SCNC: 8.2 MG/DL (ref 8.6–10.5)
CHLORIDE SERPL-SCNC: 110 MMOL/L (ref 98–107)
CO2 SERPL-SCNC: 22 MMOL/L (ref 22–29)
CREAT BLD-MCNC: 1.05 MG/DL (ref 0.57–1)
CYTO UR: NORMAL
DEPRECATED RDW RBC AUTO: 52.7 FL (ref 37–54)
EOSINOPHIL # BLD AUTO: 0.19 10*3/MM3 (ref 0–0.4)
EOSINOPHIL NFR BLD AUTO: 5.3 % (ref 0.3–6.2)
ERYTHROCYTE [DISTWIDTH] IN BLOOD BY AUTOMATED COUNT: 14.1 % (ref 12.3–15.4)
GFR SERPL CREATININE-BSD FRML MDRD: 54 ML/MIN/1.73
GLUCOSE BLD-MCNC: 190 MG/DL (ref 65–99)
GLUCOSE BLDC GLUCOMTR-MCNC: 148 MG/DL (ref 70–130)
GLUCOSE BLDC GLUCOMTR-MCNC: 189 MG/DL (ref 70–130)
HCT VFR BLD AUTO: 35.3 % (ref 34–46.6)
HGB BLD-MCNC: 11.2 G/DL (ref 12–15.9)
IMM GRANULOCYTES # BLD AUTO: 0.01 10*3/MM3 (ref 0–0.05)
IMM GRANULOCYTES NFR BLD AUTO: 0.3 % (ref 0–0.5)
LAB AP CASE REPORT: NORMAL
LAB AP CLINICAL INFORMATION: NORMAL
LYMPHOCYTES # BLD AUTO: 1.14 10*3/MM3 (ref 0.7–3.1)
LYMPHOCYTES NFR BLD AUTO: 31.9 % (ref 19.6–45.3)
MCH RBC QN AUTO: 32.3 PG (ref 26.6–33)
MCHC RBC AUTO-ENTMCNC: 31.7 G/DL (ref 31.5–35.7)
MCV RBC AUTO: 101.7 FL (ref 79–97)
MONOCYTES # BLD AUTO: 0.36 10*3/MM3 (ref 0.1–0.9)
MONOCYTES NFR BLD AUTO: 10.1 % (ref 5–12)
NEUTROPHILS # BLD AUTO: 1.85 10*3/MM3 (ref 1.7–7)
NEUTROPHILS NFR BLD AUTO: 51.8 % (ref 42.7–76)
NRBC BLD AUTO-RTO: 0 /100 WBC (ref 0–0.2)
PATH REPORT.FINAL DX SPEC: NORMAL
PATH REPORT.GROSS SPEC: NORMAL
PLATELET # BLD AUTO: 77 10*3/MM3 (ref 140–450)
PMV BLD AUTO: 10.2 FL (ref 6–12)
POTASSIUM BLD-SCNC: 3.7 MMOL/L (ref 3.5–5.2)
RBC # BLD AUTO: 3.47 10*6/MM3 (ref 3.77–5.28)
SODIUM BLD-SCNC: 140 MMOL/L (ref 136–145)
WBC NRBC COR # BLD: 3.57 10*3/MM3 (ref 3.4–10.8)

## 2020-02-22 PROCEDURE — 85025 COMPLETE CBC W/AUTO DIFF WBC: CPT | Performed by: NURSE PRACTITIONER

## 2020-02-22 PROCEDURE — 99239 HOSP IP/OBS DSCHRG MGMT >30: CPT | Performed by: FAMILY MEDICINE

## 2020-02-22 PROCEDURE — 25010000002 VANCOMYCIN 10 G RECONSTITUTED SOLUTION: Performed by: INTERNAL MEDICINE

## 2020-02-22 PROCEDURE — 80048 BASIC METABOLIC PNL TOTAL CA: CPT | Performed by: INTERNAL MEDICINE

## 2020-02-22 PROCEDURE — 82962 GLUCOSE BLOOD TEST: CPT

## 2020-02-22 RX ADMIN — VANCOMYCIN HYDROCHLORIDE 1250 MG: 10 INJECTION, POWDER, LYOPHILIZED, FOR SOLUTION INTRAVENOUS at 04:01

## 2020-02-22 RX ADMIN — PANTOPRAZOLE SODIUM 40 MG: 40 INJECTION, POWDER, FOR SOLUTION INTRAVENOUS at 06:44

## 2020-02-22 RX ADMIN — SODIUM CHLORIDE, PRESERVATIVE FREE 10 ML: 5 INJECTION INTRAVENOUS at 08:44

## 2020-02-22 RX ADMIN — Medication 250 MG: at 08:42

## 2020-02-22 RX ADMIN — VENLAFAXINE HYDROCHLORIDE 225 MG: 75 CAPSULE, EXTENDED RELEASE ORAL at 08:41

## 2020-02-22 RX ADMIN — AZTREONAM 2 G: 2 INJECTION, POWDER, LYOPHILIZED, FOR SOLUTION INTRAMUSCULAR; INTRAVENOUS at 00:00

## 2020-02-23 ENCOUNTER — READMISSION MANAGEMENT (OUTPATIENT)
Dept: CALL CENTER | Facility: HOSPITAL | Age: 60
End: 2020-02-23

## 2020-02-23 NOTE — OUTREACH NOTE
Prep Survey      Responses   Facility patient discharged from?  [place]   Is patient eligible?  Yes   Discharge diagnosis  Acute colitis   Does the patient have one of the following disease processes/diagnoses(primary or secondary)?  Other   Does the patient have Home health ordered?  No   Is there a DME ordered?  No   Prep survey completed?  Yes          Dali Lin RN

## 2020-02-24 LAB
BACTERIA SPEC AEROBE CULT: NORMAL
BACTERIA SPEC AEROBE CULT: NORMAL

## 2020-02-25 ENCOUNTER — READMISSION MANAGEMENT (OUTPATIENT)
Dept: CALL CENTER | Facility: HOSPITAL | Age: 60
End: 2020-02-25

## 2020-02-25 NOTE — OUTREACH NOTE
Medical Week 1 Survey      Responses   Facility patient discharged from?  [facility]   Does the patient have one of the following disease processes/diagnoses(primary or secondary)?  Other   Is there a successful TCM telephone encounter documented?  No   Week 1 attempt successful?  No   Unsuccessful attempts  Attempt 1          Veronica Belle RN

## 2020-02-26 ENCOUNTER — READMISSION MANAGEMENT (OUTPATIENT)
Dept: CALL CENTER | Facility: HOSPITAL | Age: 60
End: 2020-02-26

## 2020-02-26 NOTE — OUTREACH NOTE
Medical Week 1 Survey      Responses   Facility patient discharged from?  [facility]   Does the patient have one of the following disease processes/diagnoses(primary or secondary)?  Other   Is there a successful TCM telephone encounter documented?  No   Week 1 attempt successful?  No   Unsuccessful attempts  Attempt 2          Veronica Belle RN

## 2020-02-27 ENCOUNTER — OFFICE VISIT (OUTPATIENT)
Dept: GASTROENTEROLOGY | Facility: CLINIC | Age: 60
End: 2020-02-27

## 2020-02-27 VITALS
HEART RATE: 74 BPM | OXYGEN SATURATION: 98 % | RESPIRATION RATE: 18 BRPM | TEMPERATURE: 97.4 F | BODY MASS INDEX: 46.25 KG/M2 | WEIGHT: 261 LBS | DIASTOLIC BLOOD PRESSURE: 72 MMHG | SYSTOLIC BLOOD PRESSURE: 122 MMHG | HEIGHT: 63 IN

## 2020-02-27 DIAGNOSIS — K76.82 HEPATIC ENCEPHALOPATHY (HCC): Primary | ICD-10-CM

## 2020-02-27 DIAGNOSIS — R63.5 ABNORMAL WEIGHT GAIN: ICD-10-CM

## 2020-02-27 PROCEDURE — 99214 OFFICE O/P EST MOD 30 MIN: CPT | Performed by: INTERNAL MEDICINE

## 2020-02-27 RX ORDER — DICYCLOMINE HYDROCHLORIDE 10 MG/1
10 CAPSULE ORAL 3 TIMES DAILY PRN
Qty: 180 CAPSULE | Refills: 3 | Status: SHIPPED | OUTPATIENT
Start: 2020-02-27 | End: 2022-01-10
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
## 2020-03-02 ENCOUNTER — READMISSION MANAGEMENT (OUTPATIENT)
Dept: CALL CENTER | Facility: HOSPITAL | Age: 60
End: 2020-03-02

## 2020-03-02 NOTE — OUTREACH NOTE
Medical Week 2 Survey      Responses   Facility patient discharged from?  [place]   Does the patient have one of the following disease processes/diagnoses(primary or secondary)?  Other   Week 2 attempt successful?  No   Unsuccessful attempts  Attempt 1          Lexie King RN

## 2020-03-03 ENCOUNTER — READMISSION MANAGEMENT (OUTPATIENT)
Dept: CALL CENTER | Facility: HOSPITAL | Age: 60
End: 2020-03-03

## 2020-03-03 NOTE — OUTREACH NOTE
Medical Week 2 Survey      Responses   [facility] patient discharged from?  [place]   Does the patient have one of the following disease processes/diagnoses(primary or secondary)?  Other   Week 2 attempt successful?  No   Unsuccessful attempts  Attempt 2          Imelda Bains RN

## 2020-03-04 ENCOUNTER — TELEPHONE (OUTPATIENT)
Dept: GASTROENTEROLOGY | Facility: CLINIC | Age: 60
End: 2020-03-04

## 2020-03-04 NOTE — TELEPHONE ENCOUNTER
PT CALLED LVM REGARDING NEW DIFFICULTIES SHE IS HAVING AS WELL AS RX FOR BENTYL PHARMACY HAS NOT RECEIVED. RETURNED PT CALL, NO ANSWER. LVM

## 2020-03-10 ENCOUNTER — HOSPITAL ENCOUNTER (OUTPATIENT)
Dept: ULTRASOUND IMAGING | Facility: HOSPITAL | Age: 60
Discharge: HOME | End: 2020-03-10
Admitting: INTERNAL MEDICINE

## 2020-03-10 ENCOUNTER — READMISSION MANAGEMENT (OUTPATIENT)
Dept: CALL CENTER | Facility: HOSPITAL | Age: 60
End: 2020-03-10

## 2020-03-10 DIAGNOSIS — K76.82 HEPATIC ENCEPHALOPATHY (HCC): ICD-10-CM

## 2020-03-10 DIAGNOSIS — R63.5 ABNORMAL WEIGHT GAIN: ICD-10-CM

## 2020-03-10 PROCEDURE — 76700 US EXAM ABDOM COMPLETE: CPT

## 2020-03-10 NOTE — OUTREACH NOTE
Medical Week 3 Survey      Responses   [facility] patient discharged from?  [place]   Does the patient have one of the following disease processes/diagnoses(primary or secondary)?  Other   Week 3 attempt successful?  No   Unsuccessful attempts  Attempt 1          Orly Interiano RN   90

## 2020-03-12 ENCOUNTER — READMISSION MANAGEMENT (OUTPATIENT)
Dept: CALL CENTER | Facility: HOSPITAL | Age: 60
End: 2020-03-12

## 2020-03-12 NOTE — OUTREACH NOTE
Medical Week 3 Survey      Responses   [facility] patient discharged from?  [place]   Does the patient have one of the following disease processes/diagnoses(primary or secondary)?  Other   Week 3 attempt successful?  No   Unsuccessful attempts  Attempt 2          Omer Rae RN

## 2020-03-27 ENCOUNTER — TELEPHONE (OUTPATIENT)
Dept: GASTROENTEROLOGY | Facility: CLINIC | Age: 60
End: 2020-03-27

## 2020-03-27 NOTE — TELEPHONE ENCOUNTER
PT CALLED ADVISED XIFAXAN IS TOO EXPENSIVE AND WAS TOLD BY PHARMACY A PRIOR AUTH IS NEEDED. I HAVE COMPLETE PA ONLINE AS WELL AS FAXED ALONG WITH CLINICAL INFORMATION.

## 2020-11-10 ENCOUNTER — TELEPHONE (OUTPATIENT)
Dept: GASTROENTEROLOGY | Facility: CLINIC | Age: 60
End: 2020-11-10

## 2020-11-10 NOTE — TELEPHONE ENCOUNTER
PT CALLED TO INFORM DR. FARLEY SHE CANCELED HER TELEMEDICINE APPT FOR TODAY BUT WANTED TO ADVISE HER LABS ARE LOOKING BETTER AND WANTED TO KNOW IF SHE NEEDED TO CHANGE ANYTHING. I HAVE SENT A OV AND LAB REQUEST FAX TO [facility] SO WE CAN REVIEW THE LABS BEFORE RESCHEDULING APPT.

## 2021-07-06 ENCOUNTER — TRANSCRIBE ORDERS (OUTPATIENT)
Dept: ADMINISTRATIVE | Facility: HOSPITAL | Age: 61
End: 2021-07-06

## 2021-07-06 DIAGNOSIS — Z12.31 ENCOUNTER FOR SCREENING MAMMOGRAM FOR MALIGNANT NEOPLASM OF BREAST: Primary | ICD-10-CM

## 2021-07-20 ENCOUNTER — LAB (OUTPATIENT)
Dept: LAB | Facility: HOSPITAL | Age: 61
End: 2021-07-20

## 2021-07-20 ENCOUNTER — OFFICE VISIT (OUTPATIENT)
Dept: GASTROENTEROLOGY | Facility: CLINIC | Age: 61
End: 2021-07-20

## 2021-07-20 VITALS
OXYGEN SATURATION: 97 % | SYSTOLIC BLOOD PRESSURE: 146 MMHG | BODY MASS INDEX: 37.14 KG/M2 | DIASTOLIC BLOOD PRESSURE: 78 MMHG | HEIGHT: 70 IN | WEIGHT: 259.4 LBS | TEMPERATURE: 98.1 F | HEART RATE: 75 BPM

## 2021-07-20 DIAGNOSIS — K76.82 HEPATIC ENCEPHALOPATHY (HCC): Primary | ICD-10-CM

## 2021-07-20 DIAGNOSIS — K76.82 HEPATIC ENCEPHALOPATHY (HCC): ICD-10-CM

## 2021-07-20 PROCEDURE — 99214 OFFICE O/P EST MOD 30 MIN: CPT | Performed by: INTERNAL MEDICINE

## 2021-07-20 RX ORDER — PROMETHAZINE HYDROCHLORIDE 25 MG/1
25 TABLET ORAL AS NEEDED
COMMUNITY
Start: 2021-05-24

## 2021-07-20 RX ORDER — DICYCLOMINE HYDROCHLORIDE 10 MG/1
10 CAPSULE ORAL 3 TIMES DAILY PRN
Qty: 180 CAPSULE | Refills: 3 | Status: SHIPPED | OUTPATIENT
Start: 2021-07-20 | End: 2022-07-29
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
## 2021-07-21 LAB
ALBUMIN SERPL-MCNC: 3.2 G/DL (ref 3.5–5.2)
ALBUMIN/GLOB SERPL: 0.8 G/DL
ALP SERPL-CCNC: 133 U/L (ref 39–117)
ALT SERPL-CCNC: 22 U/L (ref 1–33)
AST SERPL-CCNC: 32 U/L (ref 1–32)
BILIRUB SERPL-MCNC: 0.8 MG/DL (ref 0–1.2)
BUN SERPL-MCNC: 20 MG/DL (ref 6–20)
BUN/CREAT SERPL: 20.8 (ref 7–25)
CALCIUM SERPL-MCNC: 9.2 MG/DL (ref 8.6–10.5)
CHLORIDE SERPL-SCNC: 102 MMOL/L (ref 98–107)
CO2 SERPL-SCNC: 26.3 MMOL/L (ref 22–29)
CREAT SERPL-MCNC: 0.96 MG/DL (ref 0.57–1)
ERYTHROCYTE [DISTWIDTH] IN BLOOD BY AUTOMATED COUNT: 12.9 % (ref 12.3–15.4)
GLOBULIN SER CALC-MCNC: 4.1 GM/DL
GLUCOSE SERPL-MCNC: 283 MG/DL (ref 65–99)
HCT VFR BLD AUTO: 37.3 % (ref 34–46.6)
HGB BLD-MCNC: 11.5 G/DL (ref 12–15.9)
INR PPP: 1.16 (ref 0.85–1.16)
MCH RBC QN AUTO: 29.4 PG (ref 26.6–33)
MCHC RBC AUTO-ENTMCNC: 30.8 G/DL (ref 31.5–35.7)
MCV RBC AUTO: 95.4 FL (ref 79–97)
PLATELET # BLD AUTO: 74 10*3/MM3 (ref 140–450)
POTASSIUM SERPL-SCNC: 5.1 MMOL/L (ref 3.5–5.2)
PROT SERPL-MCNC: 7.3 G/DL (ref 6–8.5)
PROTHROMBIN TIME: 14.4 SECONDS (ref 11.4–14.4)
RBC # BLD AUTO: 3.91 10*6/MM3 (ref 3.77–5.28)
SODIUM SERPL-SCNC: 136 MMOL/L (ref 136–145)
WBC # BLD AUTO: 2.43 10*3/MM3 (ref 3.4–10.8)

## 2021-07-26 ENCOUNTER — PREP FOR SURGERY (OUTPATIENT)
Dept: OTHER | Facility: HOSPITAL | Age: 61
End: 2021-07-26

## 2021-07-26 DIAGNOSIS — K76.82 HEPATIC ENCEPHALOPATHY (HCC): Primary | ICD-10-CM

## 2021-07-29 PROBLEM — K76.82 HEPATIC ENCEPHALOPATHY (HCC): Status: ACTIVE | Noted: 2021-07-29

## 2021-08-03 ENCOUNTER — TELEPHONE (OUTPATIENT)
Dept: SLEEP MEDICINE | Facility: HOSPITAL | Age: 61
End: 2021-08-03

## 2021-08-03 NOTE — TELEPHONE ENCOUNTER
Patient called in wanting to cancel her 1:15p appointment. I canceled the appt. Pt requested a call from [facility]. Pt stated it was regarding her CPAP pressures. Please call her back. [phone].

## 2021-08-04 DIAGNOSIS — G47.33 OSA (OBSTRUCTIVE SLEEP APNEA): Primary | ICD-10-CM

## 2021-08-04 NOTE — TELEPHONE ENCOUNTER
Patient did wish to have increased pressure due to comfort level her AHI is normal she does not require a follow-up appointment.  This has been ordered and printed

## 2021-08-20 ENCOUNTER — APPOINTMENT (OUTPATIENT)
Dept: PREADMISSION TESTING | Facility: HOSPITAL | Age: 61
End: 2021-08-20

## 2021-08-22 ENCOUNTER — APPOINTMENT (OUTPATIENT)
Dept: PREADMISSION TESTING | Facility: HOSPITAL | Age: 61
End: 2021-08-22

## 2021-09-01 ENCOUNTER — TELEPHONE (OUTPATIENT)
Dept: GASTROENTEROLOGY | Facility: CLINIC | Age: 61
End: 2021-09-01

## 2021-09-01 NOTE — TELEPHONE ENCOUNTER
Patient called and wanted to reschedule her egd until after the first of the year. I did not speak to her, she lvm. I have cancelled her appt for 09.28.21 with you and called and lvm for her to call me back to lary appt. Just wanted to let you know. Please advise if this okay.

## 2021-09-22 ENCOUNTER — APPOINTMENT (OUTPATIENT)
Dept: MAMMOGRAPHY | Facility: HOSPITAL | Age: 61
End: 2021-09-22

## 2021-11-24 ENCOUNTER — APPOINTMENT (OUTPATIENT)
Dept: MAMMOGRAPHY | Facility: HOSPITAL | Age: 61
End: 2021-11-24

## 2022-01-04 DIAGNOSIS — Z01.818 PREOP TESTING: Primary | ICD-10-CM

## 2022-01-06 ENCOUNTER — APPOINTMENT (OUTPATIENT)
Dept: PREADMISSION TESTING | Facility: HOSPITAL | Age: 62
End: 2022-01-06

## 2022-01-07 ENCOUNTER — APPOINTMENT (OUTPATIENT)
Dept: PREADMISSION TESTING | Facility: HOSPITAL | Age: 62
End: 2022-01-07

## 2022-01-09 ENCOUNTER — APPOINTMENT (OUTPATIENT)
Dept: PREADMISSION TESTING | Facility: HOSPITAL | Age: 62
End: 2022-01-09

## 2022-01-09 LAB — SARS-COV-2 RNA PNL SPEC NAA+PROBE: NOT DETECTED

## 2022-01-09 PROCEDURE — U0004 COV-19 TEST NON-CDC HGH THRU: HCPCS | Performed by: INTERNAL MEDICINE

## 2022-01-10 ENCOUNTER — PRE-ADMISSION TESTING (OUTPATIENT)
Dept: PREADMISSION TESTING | Facility: HOSPITAL | Age: 62
End: 2022-01-10

## 2022-01-10 VITALS — BODY MASS INDEX: 42.83 KG/M2 | WEIGHT: 241.73 LBS | HEIGHT: 63 IN

## 2022-01-10 LAB
DEPRECATED RDW RBC AUTO: 53.1 FL (ref 37–54)
ERYTHROCYTE [DISTWIDTH] IN BLOOD BY AUTOMATED COUNT: 15 % (ref 12.3–15.4)
HCT VFR BLD AUTO: 41.3 % (ref 34–46.6)
HGB BLD-MCNC: 13 G/DL (ref 12–15.9)
MCH RBC QN AUTO: 30.4 PG (ref 26.6–33)
MCHC RBC AUTO-ENTMCNC: 31.5 G/DL (ref 31.5–35.7)
MCV RBC AUTO: 96.7 FL (ref 79–97)
PLATELET # BLD AUTO: 88 10*3/MM3 (ref 140–450)
PMV BLD AUTO: 10.3 FL (ref 6–12)
POTASSIUM SERPL-SCNC: 4.4 MMOL/L (ref 3.5–5.2)
QT INTERVAL: 428 MS
QTC INTERVAL: 448 MS
RBC # BLD AUTO: 4.27 10*6/MM3 (ref 3.77–5.28)
SARS-COV-2 RNA PNL SPEC NAA+PROBE: NOT DETECTED
WBC NRBC COR # BLD: 3.42 10*3/MM3 (ref 3.4–10.8)

## 2022-01-10 PROCEDURE — 36415 COLL VENOUS BLD VENIPUNCTURE: CPT

## 2022-01-10 PROCEDURE — 85027 COMPLETE CBC AUTOMATED: CPT

## 2022-01-10 PROCEDURE — U0004 COV-19 TEST NON-CDC HGH THRU: HCPCS

## 2022-01-10 PROCEDURE — C9803 HOPD COVID-19 SPEC COLLECT: HCPCS

## 2022-01-10 PROCEDURE — 84132 ASSAY OF SERUM POTASSIUM: CPT

## 2022-01-10 PROCEDURE — 93005 ELECTROCARDIOGRAM TRACING: CPT

## 2022-01-10 PROCEDURE — 93010 ELECTROCARDIOGRAM REPORT: CPT | Performed by: INTERNAL MEDICINE

## 2022-01-10 RX ORDER — DULOXETIN HYDROCHLORIDE 30 MG/1
30 CAPSULE, DELAYED RELEASE ORAL 2 TIMES DAILY
COMMUNITY
End: 2023-01-23

## 2022-01-10 NOTE — PAT
Pt was instructed that she should find another  as her  has had a know exposure to COVID and is having s/s. ( encouraged her to have  go to [facility] for testing)      Pt reports she developed s/s on Saturday ( 1/8) With congestion, cough, body aches and fatigue, felling feverish but not checked temperature , with nasal bleed. COVID test performed during PAT visit.     Patient instructed to bring CPAP mask and tubing to the hospital for overnight stay.  Explained that it is not necessary to bring their CPAP machine to the hospital instead a CPAP machine will be provided for use by the hospital. If patient knows their CPAP settings, those settings will be implemented.  If not, the CPAP machine will be utilized on the auto setting using their mask and tubing.    Patient verbalized understanding.    It was noted during Pre Admission Testing that patient was wearing some form of fingernail polish (gel/regular) and/or acrylic/artificial nails.  Patient was told that polish and/or artificial nails must be removed for surgery.  If a patient had recent manicure, and would rather not remove polish or artificial nails. Then the minimum requirement is that the polish/artificial nails must be removed from the middle finger on each hand.  Patient verbalized understanding.    If patient was having surgery on an upper extremity, then the patient was instructed that fingernail polish/artificial fingernails must be removed for surgery.  NO EXCEPTIONS.  Patient verbalized understanding.    If patient was having surgery on a lower extremity, then the patient was instructed that toenail polish on both extremities must be removed for surgery.  NO EXCEPTIONS. Patient verbalized understanding.

## 2022-01-10 NOTE — DISCHARGE INSTRUCTIONS
Pt was instructed that she should find another  as her  has had a know exposure to COVID and is having s/s. ( encouraged her to have  go to [facility] for testing)     Patient instructed to bring CPAP mask and tubing to the hospital for overnight stay.  Explained that it is not necessary to bring their CPAP machine to the hospital instead a CPAP machine will be provided for use by the hospital. If patient knows their CPAP settings, those settings will be implemented.  If not, the CPAP machine will be utilized on the auto setting using their mask and tubing.    Patient verbalized understanding.    It was noted during Pre Admission Testing that patient was wearing some form of fingernail polish (gel/regular) and/or acrylic/artificial nails.  Patient was told that polish and/or artificial nails must be removed for surgery.  If a patient had recent manicure, and would rather not remove polish or artificial nails. Then the minimum requirement is that the polish/artificial nails must be removed from the middle finger on each hand.  Patient verbalized understanding.    If patient was having surgery on an upper extremity, then the patient was instructed that fingernail polish/artificial fingernails must be removed for surgery.  NO EXCEPTIONS.  Patient verbalized understanding.    If patient was having surgery on a lower extremity, then the patient was instructed that toenail polish on both extremities must be removed for surgery.  NO EXCEPTIONS. Patient verbalized understanding.    The following information and instructions were given:    Do not eat, drink, smoke or chew gum after midnight the night before surgery. This includes no mints.  Take all routine, prescribed medications including heart and blood pressure medicines with a sip of water unless otherwise instructed by your physician.   Do NOT take diabetic medication unless instructed by your physician.    DO NOT shave for two days before your procedure.   Do not wear makeup.      DO NOT wear fingernail polish (gel/regular) and/or acrylic/artificial nails on the day of surgery.   If you had a recent manicure and would rather not remove polish or artificial nails, the minimum requirement is that the polish/artificial nails must be removed from the middle finger on each hand.      If you are having surgery/procedure on an upper extremity, fingernail polish/artificial fingernails must be removed for surgery.  NO EXCEPTIONS.      If you are having surgery/procedure on a lower extremity, toenail polish on both extremities must be removed for surgery.  NO EXCEPTIONS.    Remove all jewelry (advise to go to jeweler if unable to remove).  Jewelry, especially rings, can no longer be taped for surgery.    Leave anything you consider valuable at home.    Leave your suitcase in the car until after your surgery.    Bring the following with you the day of your procedure (when applicable):       -Picture ID and insurance cards       -Co-pay/deductible required by insurance       -Medications in the original bottles (not a list) including all over-the-counter medications if not brought to PAT       -Copy of advance directive, living will or power of  documents if not brought to PAT       -CPAP or BIPAP mask and tubing (do not bring machine)       -PAT Pass    Education booklet, brochure, handout or binder related to procedure given to patient.  Education booklet also includes general information related to their recovery that mentions signs and symptoms of infection and when to call the doctor.    Pain Control After Surgery handout given to patient.    Respirex use (handout given to patient) and pneumonia prevention education provided.    Signs and Symptoms of infection discussed with patient in Pre Admission Testing.  Patient instructed to call their doctor if any of the following symptoms are noted during recovery:  Fever of 100.4 F or higher, incision that is warm or has  increasing bleeding, redness or drainage.    DVT Prevention instructions given verbally during Pre Admission Testing appointment that stress the importance of ambulation to improve blood circulation.  Also encouraged patient to perform foot exercises when in bed and application of a sequential device may be applied to lower extremities to improve circulation.

## 2022-02-07 DIAGNOSIS — Z01.818 PREOPERATIVE CLEARANCE: Primary | ICD-10-CM

## 2022-02-10 ENCOUNTER — TELEPHONE (OUTPATIENT)
Dept: GASTROENTEROLOGY | Facility: CLINIC | Age: 62
End: 2022-02-10

## 2022-02-10 NOTE — TELEPHONE ENCOUNTER
Patient had pre admission testing scheduled for today at 1:15pm. Patient was a no show. I tried calling patient but got her voicemail. Left her a voicemail to call me back letting her know we needed to reschedule this prior to her appt next week. Asked her to call me back.     Just wanted to let you know if she does not call back by tomorrow, I am taking her off your schedule.     Please advise if that is okay.

## 2022-02-11 ENCOUNTER — PRE-ADMISSION TESTING (OUTPATIENT)
Dept: PREADMISSION TESTING | Facility: HOSPITAL | Age: 62
End: 2022-02-11

## 2022-02-11 ENCOUNTER — PREP FOR SURGERY (OUTPATIENT)
Dept: OTHER | Facility: HOSPITAL | Age: 62
End: 2022-02-11

## 2022-02-11 VITALS — WEIGHT: 229.94 LBS | HEIGHT: 63 IN | BODY MASS INDEX: 40.74 KG/M2

## 2022-02-11 DIAGNOSIS — Z01.818 PREPROCEDURAL EXAMINATION: Primary | ICD-10-CM

## 2022-02-11 LAB
DEPRECATED RDW RBC AUTO: 52.8 FL (ref 37–54)
ERYTHROCYTE [DISTWIDTH] IN BLOOD BY AUTOMATED COUNT: 15.4 % (ref 12.3–15.4)
HCT VFR BLD AUTO: 41.1 % (ref 34–46.6)
HGB BLD-MCNC: 13.5 G/DL (ref 12–15.9)
MCH RBC QN AUTO: 30.8 PG (ref 26.6–33)
MCHC RBC AUTO-ENTMCNC: 32.8 G/DL (ref 31.5–35.7)
MCV RBC AUTO: 93.8 FL (ref 79–97)
PLATELET # BLD AUTO: 81 10*3/MM3 (ref 140–450)
PMV BLD AUTO: 10.2 FL (ref 6–12)
POTASSIUM SERPL-SCNC: 4.3 MMOL/L (ref 3.5–5.2)
RBC # BLD AUTO: 4.38 10*6/MM3 (ref 3.77–5.28)
WBC NRBC COR # BLD: 3.85 10*3/MM3 (ref 3.4–10.8)

## 2022-02-11 PROCEDURE — 84132 ASSAY OF SERUM POTASSIUM: CPT

## 2022-02-11 PROCEDURE — 36415 COLL VENOUS BLD VENIPUNCTURE: CPT

## 2022-02-11 PROCEDURE — 85027 COMPLETE CBC AUTOMATED: CPT

## 2022-02-11 NOTE — PAT
Patient viewed general PAT education video as instructed in their preoperative information received from their surgeon.  Patient stated the general PAT education video was viewed in its entirety and survey completed.  Copies of [facility] general education handouts (Incentive Spirometry, Meds to Beds Program, Patient Belongings, Pre-op skin preparation instructions, Blood Glucose testing, Visitor policy, Surgery FAQ, Code H) distributed to patient if not printed. Education related to the PAT pass and skin preparation for surgery (if applicable) completed in [facility] as a reinforcement to PAT education video. Patient instructed to return PAT pass provided today as well as completed skin preparation sheet (if applicable) on the day of procedure.     Additionally if patient had not viewed video yet but intended to view it at home or in our waiting area, then referred them to the handout with QR code/link provided during PAT visit.  Instructed patient to complete survey after viewing the video in its entirety.  Encouraged patient/family to read [facility] general education handouts thoroughly and notify PAT staff with any questions or concerns. Patient verbalized understanding of all information and priority content.    COVID TEST TO BE DONE ON 2/13/22    Patient instructed to bring CPAP mask and tubing to the hospital for overnight stay.  Explained that it is not necessary to bring their CPAP machine to the hospital instead a CPAP machine will be provided for use by the hospital. If patient knows their CPAP settings, those settings will be implemented.  If not, the CPAP machine will be utilized on the auto setting using their mask and tubing.    Patient verbalized understanding.    EKG IN Epic FROM 1/10/22

## 2022-02-13 ENCOUNTER — APPOINTMENT (OUTPATIENT)
Dept: PREADMISSION TESTING | Facility: HOSPITAL | Age: 62
End: 2022-02-13

## 2022-02-13 LAB — SARS-COV-2 RNA PNL SPEC NAA+PROBE: NOT DETECTED

## 2022-02-13 PROCEDURE — U0004 COV-19 TEST NON-CDC HGH THRU: HCPCS | Performed by: INTERNAL MEDICINE

## 2022-02-14 ENCOUNTER — ANESTHESIA EVENT (OUTPATIENT)
Dept: GASTROENTEROLOGY | Facility: HOSPITAL | Age: 62
End: 2022-02-14

## 2022-02-14 RX ORDER — LIDOCAINE HYDROCHLORIDE 10 MG/ML
0.5 INJECTION, SOLUTION EPIDURAL; INFILTRATION; INTRACAUDAL; PERINEURAL ONCE AS NEEDED
Status: CANCELLED | OUTPATIENT
Start: 2022-02-14

## 2022-02-14 RX ORDER — SODIUM CHLORIDE 0.9 % (FLUSH) 0.9 %
10 SYRINGE (ML) INJECTION EVERY 12 HOURS SCHEDULED
Status: CANCELLED | OUTPATIENT
Start: 2022-02-14

## 2022-02-14 RX ORDER — FAMOTIDINE 20 MG/1
20 TABLET, FILM COATED ORAL ONCE
Status: CANCELLED | OUTPATIENT
Start: 2022-02-14 | End: 2022-02-14

## 2022-02-15 ENCOUNTER — ANESTHESIA (OUTPATIENT)
Dept: GASTROENTEROLOGY | Facility: HOSPITAL | Age: 62
End: 2022-02-15

## 2022-02-15 ENCOUNTER — HOSPITAL ENCOUNTER (OUTPATIENT)
Facility: HOSPITAL | Age: 62
Setting detail: HOSPITAL OUTPATIENT SURGERY
Discharge: HOME | End: 2022-02-15
Attending: INTERNAL MEDICINE | Admitting: INTERNAL MEDICINE

## 2022-02-15 VITALS
HEART RATE: 84 BPM | DIASTOLIC BLOOD PRESSURE: 79 MMHG | SYSTOLIC BLOOD PRESSURE: 137 MMHG | OXYGEN SATURATION: 96 % | BODY MASS INDEX: 39.64 KG/M2 | RESPIRATION RATE: 20 BRPM | WEIGHT: 223.8 LBS | TEMPERATURE: 96.6 F

## 2022-02-15 LAB
GLUCOSE BLDC GLUCOMTR-MCNC: 236 MG/DL (ref 70–130)
GLUCOSE BLDC GLUCOMTR-MCNC: 271 MG/DL (ref 70–130)
GLUCOSE BLDC GLUCOMTR-MCNC: 287 MG/DL (ref 70–130)

## 2022-02-15 PROCEDURE — 82962 GLUCOSE BLOOD TEST: CPT

## 2022-02-15 PROCEDURE — 43235 EGD DIAGNOSTIC BRUSH WASH: CPT | Performed by: INTERNAL MEDICINE

## 2022-02-15 PROCEDURE — S0260 H&P FOR SURGERY: HCPCS | Performed by: INTERNAL MEDICINE

## 2022-02-15 PROCEDURE — 63710000001 INSULIN REGULAR HUMAN PER 5 UNITS: Performed by: ANESTHESIOLOGY

## 2022-02-15 PROCEDURE — 25010000002 PROPOFOL 10 MG/ML EMULSION: Performed by: NURSE ANESTHETIST, CERTIFIED REGISTERED

## 2022-02-15 RX ORDER — PROPOFOL 10 MG/ML
VIAL (ML) INTRAVENOUS AS NEEDED
Status: DISCONTINUED | OUTPATIENT
Start: 2022-02-15 | End: 2022-02-16 | Stop reason: SURG

## 2022-02-15 RX ORDER — SODIUM CHLORIDE 0.9 % (FLUSH) 0.9 %
10 SYRINGE (ML) INJECTION AS NEEDED
Status: DISCONTINUED | OUTPATIENT
Start: 2022-02-15 | End: 2022-02-15 | Stop reason: HOSPADM

## 2022-02-15 RX ORDER — IPRATROPIUM BROMIDE AND ALBUTEROL SULFATE 2.5; .5 MG/3ML; MG/3ML
3 SOLUTION RESPIRATORY (INHALATION) ONCE AS NEEDED
Status: DISCONTINUED | OUTPATIENT
Start: 2022-02-15 | End: 2022-02-15 | Stop reason: HOSPADM

## 2022-02-15 RX ORDER — MIDAZOLAM HYDROCHLORIDE 1 MG/ML
1 INJECTION INTRAMUSCULAR; INTRAVENOUS
Status: DISCONTINUED | OUTPATIENT
Start: 2022-02-15 | End: 2022-02-15 | Stop reason: HOSPADM

## 2022-02-15 RX ORDER — SODIUM CHLORIDE, SODIUM LACTATE, POTASSIUM CHLORIDE, CALCIUM CHLORIDE 600; 310; 30; 20 MG/100ML; MG/100ML; MG/100ML; MG/100ML
9 INJECTION, SOLUTION INTRAVENOUS CONTINUOUS
Status: DISCONTINUED | OUTPATIENT
Start: 2022-02-15 | End: 2022-02-15 | Stop reason: HOSPADM

## 2022-02-15 RX ORDER — ONDANSETRON 2 MG/ML
4 INJECTION INTRAMUSCULAR; INTRAVENOUS ONCE AS NEEDED
Status: DISCONTINUED | OUTPATIENT
Start: 2022-02-15 | End: 2022-02-15 | Stop reason: HOSPADM

## 2022-02-15 RX ORDER — FAMOTIDINE 10 MG/ML
20 INJECTION, SOLUTION INTRAVENOUS ONCE
Status: COMPLETED | OUTPATIENT
Start: 2022-02-15 | End: 2022-02-15

## 2022-02-15 RX ADMIN — FAMOTIDINE 20 MG: 10 INJECTION INTRAVENOUS at 10:18

## 2022-02-15 RX ADMIN — PROPOFOL 100 MG: 10 INJECTION, EMULSION INTRAVENOUS at 10:53

## 2022-02-15 RX ADMIN — INSULIN HUMAN 5 UNITS: 100 INJECTION, SOLUTION PARENTERAL at 10:18

## 2022-02-15 RX ADMIN — SODIUM CHLORIDE, POTASSIUM CHLORIDE, SODIUM LACTATE AND CALCIUM CHLORIDE 9 ML/HR: 600; 310; 30; 20 INJECTION, SOLUTION INTRAVENOUS at 10:18

## 2022-02-15 RX ADMIN — SODIUM CHLORIDE, PRESERVATIVE FREE 10 ML: 5 INJECTION INTRAVENOUS at 10:18

## 2022-02-15 NOTE — ANESTHESIA PREPROCEDURE EVALUATION
Anesthesia Evaluation     Patient summary reviewed and Nursing notes reviewed   NPO Solid Status: > 8 hours  NPO Liquid Status: > 8 hours           Airway   Mallampati: III  TM distance: >3 FB  Neck ROM: full  Possible difficult intubation  Dental      Pulmonary    (+) sleep apnea,   (-) asthma, shortness of breath, recent URI, not a smoker  Cardiovascular     (+) hypertension, dysrhythmias (on B blockers for PVCs ) PVC,   (-) past MI, angina, cardiac stents      Neuro/Psych  (+) headaches, dizziness/light headedness, psychiatric history,    (-) seizures, CVA  GI/Hepatic/Renal/Endo    (+) obesity, morbid obesity, GERD,  hepatitis, liver disease fatty liver disease, diabetes mellitus (A1c 8) type 2 poorly controlled,     Musculoskeletal     Abdominal    Substance History      OB/GYN          Other   arthritis, blood dyscrasia (ITP) thrombocytopenia,     (-) history of cancer (skin)  ROS/Med Hx Other: PLTS 81K    (given 5 u IV Insulin)                   Anesthesia Plan    ASA 3     general and MAC   (ETT ready- gastroparesis  Check post op BSL)  intravenous induction     Anesthetic plan, all risks, benefits, and alternatives have been provided, discussed and informed consent has been obtained with: patient.    Plan discussed with CRNA.        CODE STATUS:
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
## 2022-02-16 NOTE — ANESTHESIA POSTPROCEDURE EVALUATION
Patient: Jeevan Cardoso    Procedure Summary     Date: 02/15/22 Room / [facility]:  [facility]    Anesthesia Start: 1046 Anesthesia Stop: 1100    Procedure: ESOPHAGOGASTRODUODENOSCOPY (N/A ) Diagnosis:       Hepatic encephalopathy (HCC)      (Hepatic encephalopathy (CMS/HCC) [K72.90])    Surgeons: David Bustillos MD Provider: Dilan Rene MD    Anesthesia Type: general, MAC ASA Status: 3          Anesthesia Type: general, MAC    Vitals  Vitals Value Taken Time   /79 02/15/22 1115   Temp 96.6 °F (35.9 °C) 02/15/22 1059   Pulse 83 02/15/22 1120   Resp     SpO2 96 % 02/15/22 1121   Vitals shown include unvalidated device data.        Post Anesthesia Care and Evaluation      Comments: WRITTEN 23 HOURS LATER SEE VS FROM PACU RN NOTES     HANDOFF AND STOP TIMES APPROXIMATED AS WELL

## 2022-02-24 ENCOUNTER — TELEPHONE (OUTPATIENT)
Dept: GASTROENTEROLOGY | Facility: CLINIC | Age: 62
End: 2022-02-24

## 2022-05-12 ENCOUNTER — TELEPHONE (OUTPATIENT)
Dept: NEUROLOGY | Facility: CLINIC | Age: 62
End: 2022-05-12
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
## 2022-05-18 ENCOUNTER — TRANSCRIBE ORDERS (OUTPATIENT)
Dept: ADMINISTRATIVE | Facility: HOSPITAL | Age: 62
End: 2022-05-18

## 2022-05-18 DIAGNOSIS — R63.4 WEIGHT LOSS, ABNORMAL: Primary | ICD-10-CM

## 2022-07-25 ENCOUNTER — TRANSCRIBE ORDERS (OUTPATIENT)
Dept: ADMINISTRATIVE | Facility: HOSPITAL | Age: 62
End: 2022-07-25

## 2022-07-25 DIAGNOSIS — R63.4 WEIGHT LOSS, ABNORMAL: Primary | ICD-10-CM

## 2022-07-28 ENCOUNTER — TRANSCRIBE ORDERS (OUTPATIENT)
Dept: ADMINISTRATIVE | Facility: HOSPITAL | Age: 62
End: 2022-07-28

## 2022-07-28 DIAGNOSIS — Z12.31 VISIT FOR SCREENING MAMMOGRAM: Primary | ICD-10-CM

## 2022-07-29 DIAGNOSIS — K76.82 HEPATIC ENCEPHALOPATHY: ICD-10-CM

## 2022-07-29 RX ORDER — DICYCLOMINE HYDROCHLORIDE 10 MG/1
CAPSULE ORAL
Qty: 180 CAPSULE | Refills: 3 | Status: SHIPPED | OUTPATIENT
Start: 2022-07-29

## 2022-08-05 ENCOUNTER — HOSPITAL ENCOUNTER (OUTPATIENT)
Dept: CT IMAGING | Facility: HOSPITAL | Age: 62
Discharge: HOME | End: 2022-08-05
Admitting: FAMILY MEDICINE

## 2022-08-05 DIAGNOSIS — R63.4 WEIGHT LOSS, ABNORMAL: ICD-10-CM

## 2022-08-05 LAB — CREAT BLDA-MCNC: 1.2 MG/DL (ref 0.6–1.3)

## 2022-08-05 PROCEDURE — 71260 CT THORAX DX C+: CPT

## 2022-08-05 PROCEDURE — 25010000002 IOPAMIDOL 61 % SOLUTION: Performed by: FAMILY MEDICINE

## 2022-08-05 PROCEDURE — 74177 CT ABD & PELVIS W/CONTRAST: CPT

## 2022-08-05 PROCEDURE — 82565 ASSAY OF CREATININE: CPT

## 2022-08-05 RX ADMIN — IOPAMIDOL 95 ML: 612 INJECTION, SOLUTION INTRAVENOUS at 15:34

## 2022-10-03 ENCOUNTER — APPOINTMENT (OUTPATIENT)
Dept: MAMMOGRAPHY | Facility: HOSPITAL | Age: 62
End: 2022-10-03

## 2022-11-28 ENCOUNTER — TELEPHONE (OUTPATIENT)
Dept: GASTROENTEROLOGY | Facility: CLINIC | Age: 62
End: 2022-11-28

## 2022-11-28 NOTE — TELEPHONE ENCOUNTER
PATIENT WOULD LIKE TO ASK ABOUT MEDICATIONS.    PATIENT ON LACTULOSE AND IS HOPING TO BE OFF OF IT.  PATIENT IS HOPING TO TAKE RISAMTHIN INSTEAD.    PLEASE ADVISE.

## 2022-11-30 ENCOUNTER — APPOINTMENT (OUTPATIENT)
Dept: MAMMOGRAPHY | Facility: HOSPITAL | Age: 62
End: 2022-11-30

## 2022-11-30 ENCOUNTER — PRIOR AUTHORIZATION (OUTPATIENT)
Dept: GASTROENTEROLOGY | Facility: CLINIC | Age: 62
End: 2022-11-30

## 2022-11-30 ENCOUNTER — OFFICE VISIT (OUTPATIENT)
Dept: GASTROENTEROLOGY | Facility: CLINIC | Age: 62
End: 2022-11-30

## 2022-11-30 DIAGNOSIS — K76.82 HEPATIC ENCEPHALOPATHY: Primary | ICD-10-CM

## 2022-11-30 PROCEDURE — 99443 PR PHYS/QHP TELEPHONE EVALUATION 21-30 MIN: CPT | Performed by: INTERNAL MEDICINE
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
## 2022-12-01 DIAGNOSIS — K76.82 HEPATIC ENCEPHALOPATHY: ICD-10-CM

## 2022-12-02 NOTE — TELEPHONE ENCOUNTER
Ms Walker Garcia called and left voice message stating Xifaxan PA is $800. [facility]. Please call patient back. Thanks

## 2022-12-05 NOTE — TELEPHONE ENCOUNTER
Jose  Ms Cardoso called and left a message concerning high copay for Xifaxan. She can't use Xifaxan copay card([employer] medicare). Please give her a call back. Thanks

## 2023-01-03 ENCOUNTER — TELEPHONE (OUTPATIENT)
Dept: GASTROENTEROLOGY | Facility: CLINIC | Age: 63
End: 2023-01-03
Payer: MEDICARE

## 2023-01-04 ENCOUNTER — LAB (OUTPATIENT)
Dept: LAB | Facility: HOSPITAL | Age: 63
End: 2023-01-04
Payer: MEDICARE

## 2023-01-04 ENCOUNTER — HOSPITAL ENCOUNTER (OUTPATIENT)
Dept: ULTRASOUND IMAGING | Facility: HOSPITAL | Age: 63
Discharge: HOME | End: 2023-01-04
Payer: MEDICARE

## 2023-01-04 DIAGNOSIS — K76.82 HEPATIC ENCEPHALOPATHY: ICD-10-CM

## 2023-01-04 LAB
ALBUMIN SERPL-MCNC: 3.1 G/DL (ref 3.5–5.2)
ALBUMIN/GLOB SERPL: 0.8 G/DL
ALP SERPL-CCNC: 89 U/L (ref 39–117)
ALT SERPL W P-5'-P-CCNC: 19 U/L (ref 1–33)
ANION GAP SERPL CALCULATED.3IONS-SCNC: 6.7 MMOL/L (ref 5–15)
AST SERPL-CCNC: 41 U/L (ref 1–32)
BILIRUB SERPL-MCNC: 0.8 MG/DL (ref 0–1.2)
BUN SERPL-MCNC: 18 MG/DL (ref 8–23)
BUN/CREAT SERPL: 16.4 (ref 7–25)
CALCIUM SPEC-SCNC: 8.9 MG/DL (ref 8.6–10.5)
CHLORIDE SERPL-SCNC: 107 MMOL/L (ref 98–107)
CO2 SERPL-SCNC: 26.3 MMOL/L (ref 22–29)
CREAT SERPL-MCNC: 1.1 MG/DL (ref 0.57–1)
DEPRECATED RDW RBC AUTO: 44.1 FL (ref 37–54)
EGFRCR SERPLBLD CKD-EPI 2021: 57.6 ML/MIN/1.73
ERYTHROCYTE [DISTWIDTH] IN BLOOD BY AUTOMATED COUNT: 12.7 % (ref 12.3–15.4)
GLOBULIN UR ELPH-MCNC: 4.1 GM/DL
GLUCOSE SERPL-MCNC: 127 MG/DL (ref 65–99)
HCT VFR BLD AUTO: 36 % (ref 34–46.6)
HGB BLD-MCNC: 12.2 G/DL (ref 12–15.9)
INR PPP: 1.25 (ref 0.84–1.13)
MCH RBC QN AUTO: 32.2 PG (ref 26.6–33)
MCHC RBC AUTO-ENTMCNC: 33.9 G/DL (ref 31.5–35.7)
MCV RBC AUTO: 95 FL (ref 79–97)
PLATELET # BLD AUTO: 72 10*3/MM3 (ref 140–450)
PMV BLD AUTO: 10.6 FL (ref 6–12)
POTASSIUM SERPL-SCNC: 5 MMOL/L (ref 3.5–5.2)
PROT SERPL-MCNC: 7.2 G/DL (ref 6–8.5)
PROTHROMBIN TIME: 15.6 SECONDS (ref 11.4–14.4)
RBC # BLD AUTO: 3.79 10*6/MM3 (ref 3.77–5.28)
SODIUM SERPL-SCNC: 140 MMOL/L (ref 136–145)
WBC NRBC COR # BLD: 2.86 10*3/MM3 (ref 3.4–10.8)

## 2023-01-04 PROCEDURE — 85610 PROTHROMBIN TIME: CPT

## 2023-01-04 PROCEDURE — 85027 COMPLETE CBC AUTOMATED: CPT

## 2023-01-04 PROCEDURE — 80053 COMPREHEN METABOLIC PANEL: CPT

## 2023-01-04 PROCEDURE — 76700 US EXAM ABDOM COMPLETE: CPT

## 2023-01-23 ENCOUNTER — OFFICE VISIT (OUTPATIENT)
Dept: NEUROLOGY | Facility: CLINIC | Age: 63
End: 2023-01-23
Payer: MEDICARE

## 2023-01-23 ENCOUNTER — LAB (OUTPATIENT)
Dept: LAB | Facility: HOSPITAL | Age: 63
End: 2023-01-23
Payer: MEDICARE

## 2023-01-23 VITALS
DIASTOLIC BLOOD PRESSURE: 70 MMHG | WEIGHT: 224 LBS | TEMPERATURE: 97.7 F | SYSTOLIC BLOOD PRESSURE: 118 MMHG | HEART RATE: 80 BPM | BODY MASS INDEX: 39.69 KG/M2 | OXYGEN SATURATION: 98 % | HEIGHT: 63 IN

## 2023-01-23 DIAGNOSIS — E11.649 TYPE 2 DIABETES MELLITUS WITH HYPOGLYCEMIA WITHOUT COMA, WITHOUT LONG-TERM CURRENT USE OF INSULIN: ICD-10-CM

## 2023-01-23 DIAGNOSIS — D69.6 THROMBOCYTOPENIA: Chronic | ICD-10-CM

## 2023-01-23 DIAGNOSIS — G56.92 NEUROPATHY, ARM, LEFT: ICD-10-CM

## 2023-01-23 DIAGNOSIS — E03.9 ACQUIRED HYPOTHYROIDISM: ICD-10-CM

## 2023-01-23 DIAGNOSIS — R27.0 ATAXIA: ICD-10-CM

## 2023-01-23 DIAGNOSIS — G43.109 MIGRAINE WITH AURA AND WITHOUT STATUS MIGRAINOSUS, NOT INTRACTABLE: ICD-10-CM

## 2023-01-23 DIAGNOSIS — R27.0 ATAXIA: Primary | ICD-10-CM

## 2023-01-23 DIAGNOSIS — E55.9 VITAMIN D DEFICIENCY: ICD-10-CM

## 2023-01-23 PROCEDURE — 99214 OFFICE O/P EST MOD 30 MIN: CPT | Performed by: NURSE PRACTITIONER

## 2023-01-23 PROCEDURE — 36415 COLL VENOUS BLD VENIPUNCTURE: CPT

## 2023-01-23 PROCEDURE — 86431 RHEUMATOID FACTOR QUANT: CPT

## 2023-01-23 RX ORDER — RIZATRIPTAN BENZOATE 10 MG/1
10 TABLET ORAL ONCE AS NEEDED
COMMUNITY

## 2023-01-23 RX ORDER — SPIRONOLACTONE 25 MG/1
25 TABLET ORAL DAILY
COMMUNITY

## 2023-01-23 RX ORDER — GLIPIZIDE 5 MG/1
TABLET, FILM COATED, EXTENDED RELEASE ORAL
COMMUNITY
Start: 2022-12-19

## 2023-01-23 RX ORDER — BUTALBITAL, ACETAMINOPHEN AND CAFFEINE 50; 325; 40 MG/1; MG/1; MG/1
TABLET ORAL
COMMUNITY
Start: 2022-12-12
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
## 2023-01-24 LAB — CHROMATIN AB SERPL-ACNC: <10 IU/ML (ref 0–14)

## 2023-01-31 ENCOUNTER — TELEPHONE (OUTPATIENT)
Dept: NEUROLOGY | Facility: CLINIC | Age: 63
End: 2023-01-31
Payer: MEDICARE

## 2023-01-31 DIAGNOSIS — D69.6 THROMBOCYTOPENIA: Primary | ICD-10-CM

## 2023-01-31 DIAGNOSIS — D72.810 LYMPHOPENIA: ICD-10-CM

## 2023-01-31 DIAGNOSIS — R53.83 FATIGUE, UNSPECIFIED TYPE: ICD-10-CM

## 2023-01-31 LAB
25(OH)D3+25(OH)D2 SERPL-MCNC: 43.2 NG/ML (ref 30–100)
ANA HOMOGEN TITR SER: ABNORMAL {TITER}
ANA SER QL IF: POSITIVE
AQP4 H2O CHANNEL IGG SERPL IA-ACNC: <1.5 U/ML (ref 0–3)
BASOPHILS # BLD AUTO: 0 X10E3/UL (ref 0–0.2)
BASOPHILS NFR BLD AUTO: 1 %
CENTROMERE B AB SER-ACNC: <0.2 AI (ref 0–0.9)
CHROMATIN AB SERPL-ACNC: <0.2 AI (ref 0–0.9)
CK SERPL-CCNC: 66 U/L (ref 32–182)
DSDNA AB SER-ACNC: 1 IU/ML (ref 0–9)
ENA JO1 AB SER-ACNC: <0.2 AI (ref 0–0.9)
ENA RNP AB SER-ACNC: <0.2 AI (ref 0–0.9)
ENA SCL70 AB SER-ACNC: <0.2 AI (ref 0–0.9)
ENA SM AB SER-ACNC: <0.2 AI (ref 0–0.9)
ENA SS-A AB SER-ACNC: <0.2 AI (ref 0–0.9)
ENA SS-B AB SER-ACNC: <0.2 AI (ref 0–0.9)
EOSINOPHIL # BLD AUTO: 0.1 X10E3/UL (ref 0–0.4)
EOSINOPHIL NFR BLD AUTO: 4 %
ERYTHROCYTE [DISTWIDTH] IN BLOOD BY AUTOMATED COUNT: 12.7 % (ref 11.7–15.4)
ERYTHROCYTE [SEDIMENTATION RATE] IN BLOOD BY WESTERGREN METHOD: NORMAL MM/HR
FOLATE SERPL-MCNC: 17.8 NG/ML
HBA1C MFR BLD: 8.4 % (ref 4.8–5.6)
HCT VFR BLD AUTO: 36.8 % (ref 34–46.6)
HGB BLD-MCNC: 12 G/DL (ref 11.1–15.9)
IMM GRANULOCYTES # BLD AUTO: 0 X10E3/UL (ref 0–0.1)
IMM GRANULOCYTES NFR BLD AUTO: 1 %
LABORATORY COMMENT REPORT: ABNORMAL
LYMPHOCYTES # BLD AUTO: 0.7 X10E3/UL (ref 0.7–3.1)
LYMPHOCYTES NFR BLD AUTO: 31 %
Lab: ABNORMAL
Lab: ABNORMAL
MCH RBC QN AUTO: 32.2 PG (ref 26.6–33)
MCHC RBC AUTO-ENTMCNC: 32.6 G/DL (ref 31.5–35.7)
MCV RBC AUTO: 99 FL (ref 79–97)
MOG AB SER QL CBA IFA: NEGATIVE
MONOCYTES # BLD AUTO: 0.2 X10E3/UL (ref 0.1–0.9)
MONOCYTES NFR BLD AUTO: 9 %
MORPHOLOGY BLD-IMP: ABNORMAL
NEUTROPHILS # BLD AUTO: 1.3 X10E3/UL (ref 1.4–7)
NEUTROPHILS NFR BLD AUTO: 54 %
PLATELET # BLD AUTO: 87 X10E3/UL (ref 150–450)
RBC # BLD AUTO: 3.73 X10E6/UL (ref 3.77–5.28)
REQUEST PROBLEM: NORMAL
TSH SERPL DL<=0.005 MIU/L-ACNC: 2.24 UIU/ML (ref 0.45–4.5)
VIT B12 SERPL-MCNC: 1124 PG/ML (ref 232–1245)
WBC # BLD AUTO: 2.4 X10E3/UL (ref 3.4–10.8)

## 2023-02-21 ENCOUNTER — TELEPHONE (OUTPATIENT)
Dept: NEUROLOGY | Facility: CLINIC | Age: 63
End: 2023-02-21
Payer: MEDICARE

## 2023-02-21 ENCOUNTER — SPECIALTY PHARMACY (OUTPATIENT)
Dept: ONCOLOGY | Facility: HOSPITAL | Age: 63
End: 2023-02-21
Payer: MEDICARE

## 2023-02-21 NOTE — TELEPHONE ENCOUNTER
Patient called and stated she needed to reschedule her follow up appointment due to provider being out of office.  Scheduled new appointment and patient stated she was also approved for Botox by [employer] [facility] and would like to start the day of her new appointment on 06/02/2023 and would like to know the cost.

## 2023-03-06 ENCOUNTER — TELEMEDICINE (OUTPATIENT)
Dept: GASTROENTEROLOGY | Facility: CLINIC | Age: 63
End: 2023-03-06
Payer: MEDICARE

## 2023-03-06 DIAGNOSIS — K76.82 HEPATIC ENCEPHALOPATHY: Primary | ICD-10-CM

## 2023-03-06 DIAGNOSIS — K74.69 DECOMPENSATED LIVER DISEASE: ICD-10-CM

## 2023-03-06 PROCEDURE — 99443 PR PHYS/QHP TELEPHONE EVALUATION 21-30 MIN: CPT | Performed by: INTERNAL MEDICINE
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
## 2023-03-08 ENCOUNTER — PRIOR AUTHORIZATION (OUTPATIENT)
Dept: GASTROENTEROLOGY | Facility: CLINIC | Age: 63
End: 2023-03-08
Payer: MEDICARE

## 2023-03-08 NOTE — TELEPHONE ENCOUNTER
Jeevan Cardoso (Jerome: [id number])  Rx #: [id number]  Xifaxan 550MG tablets     Form  [employer] Electronic PA Form

## 2023-03-10 ENCOUNTER — TELEPHONE (OUTPATIENT)
Dept: ONCOLOGY | Facility: CLINIC | Age: 63
End: 2023-03-10

## 2023-03-10 DIAGNOSIS — K76.82 HEPATIC ENCEPHALOPATHY: ICD-10-CM

## 2023-03-10 DIAGNOSIS — K74.69 DECOMPENSATED LIVER DISEASE: ICD-10-CM

## 2023-03-10 NOTE — TELEPHONE ENCOUNTER
"  Caller: Jeevan Cardoso \"Neela\"    Relationship: Self    Best call back number:[phone]      What was the call regarding: PATIENT CALLED TO CANCEL AND SHE WILL CALL BACK TO RESCHEDULE      "

## 2023-03-10 NOTE — TELEPHONE ENCOUNTER
The drug above is APPROVED for/through 12/31/2023 for up to a 30-day supply by [facility]'s Pharmacy Coverage Policy. You asked for the drug above for a 90 day supply. Page 5 of your Prescription Drug Guide (PDG) says that some drugs are limited to a 30-day supply. Due to this drugs cost and/or tier, your plan only allows up to a 30-day supply of this drug per fill. The 30-day supply limit on certain drugs can also apply to mail-order. You can view your PDG and see a full list of in-network pharmacies by visiting us online or call the number on the back of your card.

## 2023-03-15 ENCOUNTER — SPECIALTY PHARMACY (OUTPATIENT)
Dept: ONCOLOGY | Facility: HOSPITAL | Age: 63
End: 2023-03-15
Payer: MEDICARE
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
## 2023-03-22 NOTE — TELEPHONE ENCOUNTER
Patient called back stating her insurance was not covering the medication. Patient is to call insurance and call back.

## 2023-04-13 ENCOUNTER — TELEPHONE (OUTPATIENT)
Dept: NEUROLOGY | Facility: CLINIC | Age: 63
End: 2023-04-13

## 2023-04-13 NOTE — TELEPHONE ENCOUNTER
"  Caller: Jeevan Cardoso \"Neela\"    Relationship: Self    Best call back number: [phone]    Who are you requesting to speak with (clinical staff, provider,  specific staff member):   PARVEZ DAS / BILLY    What was the call regarding:   PT IS SCHEDULED FOR A BOTOX INJECTION ON 6-2-23 AND IS WANTING TO KNOW WHAT THE COST WILL BE.    ALSO, PT WOULD LIKE TO HAVE THE INJECTION SOONER IF POSSIBLE.    Do you require a callback: YES, PLEASE.  "

## 2023-05-04 ENCOUNTER — HOSPITAL ENCOUNTER (OUTPATIENT)
Dept: MRI IMAGING | Facility: HOSPITAL | Age: 63
Discharge: HOME | End: 2023-05-04
Payer: MEDICARE

## 2023-05-04 DIAGNOSIS — R27.0 ATAXIA: ICD-10-CM

## 2023-05-04 PROCEDURE — 70553 MRI BRAIN STEM W/O & W/DYE: CPT

## 2023-05-04 PROCEDURE — 82565 ASSAY OF CREATININE: CPT

## 2023-05-04 PROCEDURE — A9577 INJ MULTIHANCE: HCPCS | Performed by: NURSE PRACTITIONER

## 2023-05-04 PROCEDURE — 0 GADOBENATE DIMEGLUMINE 529 MG/ML SOLUTION: Performed by: NURSE PRACTITIONER

## 2023-05-04 RX ADMIN — GADOBENATE DIMEGLUMINE 20 ML: 529 INJECTION, SOLUTION INTRAVENOUS at 16:34

## 2023-05-07 LAB — CREAT BLDA-MCNC: 1.2 MG/DL (ref 0.6–1.3)

## 2023-05-08 ENCOUNTER — TELEPHONE (OUTPATIENT)
Dept: NEUROLOGY | Facility: CLINIC | Age: 63
End: 2023-05-08
Payer: MEDICARE

## 2023-05-08 NOTE — TELEPHONE ENCOUNTER
Called patient and and left vm with results.     ----- Message from Ayla Chávez DNP, APRN sent at 5/8/2023  1:14 PM EDT -----  Please notify pt MRI of brain with no concerning findings.

## 2023-05-09 ENCOUNTER — HOSPITAL ENCOUNTER (OUTPATIENT)
Dept: MAMMOGRAPHY | Facility: HOSPITAL | Age: 63
Discharge: HOME | End: 2023-05-09
Admitting: FAMILY MEDICINE
Payer: MEDICARE

## 2023-05-09 DIAGNOSIS — Z12.31 VISIT FOR SCREENING MAMMOGRAM: ICD-10-CM

## 2023-05-09 PROCEDURE — 77063 BREAST TOMOSYNTHESIS BI: CPT

## 2023-05-09 PROCEDURE — 77067 SCR MAMMO BI INCL CAD: CPT

## 2023-06-01 ENCOUNTER — SPECIALTY PHARMACY (OUTPATIENT)
Dept: ONCOLOGY | Facility: HOSPITAL | Age: 63
End: 2023-06-01

## 2023-06-01 NOTE — PROGRESS NOTES
"Specialty Pharmacy Refill Coordination Note     Jeevan \"Neela\" is a 62 y.o. female contacted today regarding refills of  Botox specialty medication(s). Patient is scheduled for her botox injection on 6/2.     Reviewed and verified with patient:       Specialty medication(s) and dose(s) confirmed: yes        Delivery Questions    Flowsheet Row Most Recent Value   Delivery method [employer]   Delivery address correct? Yes   Preferred delivery time? Anytime   Number of medications in delivery 1   Medication being filled and delivered Botox-[facility] to [facility]   Doses left of specialty medications [facility]   Is there any medication that is due not being filled? No   Supplies needed? No supplies needed   Cooler needed? Yes   Do any medications need mixed or dated? No   Copay form of payment Credit card on file   Questions or concerns for the pharmacist? No   Are any medications first time fills? Yes                 Follow-up: 3 month(s)     Marina Borrero, Pharmacy Technician  Specialty Pharmacy Technician      "

## 2023-06-02 ENCOUNTER — OFFICE VISIT (OUTPATIENT)
Dept: NEUROLOGY | Facility: CLINIC | Age: 63
End: 2023-06-02

## 2023-06-02 ENCOUNTER — PROCEDURE VISIT (OUTPATIENT)
Dept: NEUROLOGY | Facility: CLINIC | Age: 63
End: 2023-06-02

## 2023-06-02 VITALS — WEIGHT: 227.4 LBS | BODY MASS INDEX: 40.29 KG/M2 | HEIGHT: 63 IN

## 2023-06-02 DIAGNOSIS — D69.6 THROMBOCYTOPENIA: Primary | ICD-10-CM

## 2023-06-02 DIAGNOSIS — M79.605 LEFT LEG PAIN: ICD-10-CM

## 2023-06-02 DIAGNOSIS — G47.33 OSA (OBSTRUCTIVE SLEEP APNEA): Chronic | ICD-10-CM

## 2023-06-02 DIAGNOSIS — G43.719 INTRACTABLE CHRONIC MIGRAINE WITHOUT AURA AND WITHOUT STATUS MIGRAINOSUS: ICD-10-CM

## 2023-06-02 DIAGNOSIS — D72.810 LYMPHOPENIA: ICD-10-CM

## 2023-06-02 DIAGNOSIS — R29.6 FREQUENT FALLS: ICD-10-CM

## 2023-06-02 DIAGNOSIS — G43.109 MIGRAINE WITH AURA AND WITHOUT STATUS MIGRAINOSUS, NOT INTRACTABLE: Primary | ICD-10-CM

## 2023-06-02 PROBLEM — F32.A DEPRESSION: Status: ACTIVE | Noted: 2018-06-29

## 2023-06-02 PROBLEM — I10 HTN (HYPERTENSION), BENIGN: Status: ACTIVE | Noted: 2023-06-02

## 2023-06-02 PROBLEM — F42.9 OCD (OBSESSIVE COMPULSIVE DISORDER): Status: ACTIVE | Noted: 2023-06-02

## 2023-06-02 PROBLEM — J30.1 SEASONAL ALLERGIC RHINITIS DUE TO POLLEN: Status: ACTIVE | Noted: 2023-06-02

## 2023-06-02 PROBLEM — G47.00 INSOMNIA: Status: ACTIVE | Noted: 2018-06-29

## 2023-06-02 PROBLEM — R06.83 SNORING: Status: RESOLVED | Noted: 2017-03-17 | Resolved: 2023-06-02

## 2023-06-02 PROBLEM — Z87.19 HX OF GASTROESOPHAGEAL REFLUX (GERD): Status: ACTIVE | Noted: 2023-06-02

## 2023-06-02 PROBLEM — F41.1 ANXIETY AS ACUTE REACTION TO EXCEPTIONAL STRESS: Status: ACTIVE | Noted: 2023-06-02

## 2023-06-02 PROBLEM — F43.0 ANXIETY AS ACUTE REACTION TO EXCEPTIONAL STRESS: Status: ACTIVE | Noted: 2023-06-02

## 2023-06-02 PROBLEM — Z99.89 OBSTRUCTIVE SLEEP APNEA ON CPAP: Status: ACTIVE | Noted: 2017-05-08

## 2023-06-02 PROBLEM — M79.10 MUSCLE PAIN: Status: ACTIVE | Noted: 2018-06-29

## 2023-06-02 PROBLEM — E11.9 DIABETES: Status: ACTIVE | Noted: 2018-12-28

## 2023-06-02 PROCEDURE — 99214 OFFICE O/P EST MOD 30 MIN: CPT | Performed by: NURSE PRACTITIONER
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
## 2023-06-02 NOTE — PROGRESS NOTES
Botox Procedure Note       Patient Name: Jeevan Cardoso  : 1960   MRN: [id number]   PCP: Dr Mcnamara  Chief Complaint:    Chief Complaint   Patient presents with   • Botulinum Toxin Injection       History of Present Illness: Jeevan Cardoso is a 62 y.o. female who is here today for Botox injections for migraines.    MRI Brain With & Without Contrast (2023 16:46)-neg      We have discussed risk and benefits of this Botox procedure and common side effects including headache, bleeding, infection, worsening of pain, neck pain, neck stiffness or weakness, damage to the areas being injected, weakness of muscles, loss of muscle control, ptosis, flu-like symptoms as well as more serious possible adverse effects including possible dysphagia, facial droop if injecting the facial area, painful injection, respiratory distress or even death (death has only been reported once with adults for Botox for migraines in another state when mixed with lidocaine solution which we do not use lidocaine solution in our practice for mixing Botox). The patient verbally understands and accepts risks and agrees with moving forward with Botox injections for chronic migraine prevention.    Verbal and written consent has been obtained from the patient prior to beginning treatment injections.     Pertinent Medical History:  Response to treatment has reduced headaches at least 7 fewer days a month and / or 100 hours fewer each month.       Subjective     The following portions of the patient's history were reviewed an updated as appropriate: past family history, past medical history, past social history, past surgical history.     Medications:     Current Outpatient Medications:   •  ALPRAZolam (XANAX) 0.5 MG tablet, Take 1 tablet by mouth 3 (Three) Times a Day As Needed for Anxiety., Disp: , Rfl:   •  atorvastatin (LIPITOR) 10 MG tablet, Take 1 tablet by mouth Daily., Disp: , Rfl:   •  butalbital-acetaminophen-caffeine (FIORICET,  ESGIC) -40 MG per tablet, , Disp: , Rfl:   •  Coenzyme Q10 (COQ10) 200 MG capsule, Take 2 capsules by mouth Daily., Disp: , Rfl:   •  diclofenac sodium (VOTAREN XR) 100 MG 24 hr tablet, , Disp: , Rfl:   •  dicyclomine (BENTYL) 10 MG capsule, TAKE 1 CAPSULE THREE TIMES DAILY AS NEEDED FOR ABDOMINAL PAIN, Disp: 180 capsule, Rfl: 3  •  furosemide (LASIX) 20 MG tablet, Take 1 tablet by mouth As Needed (swelling)., Disp: , Rfl: 0  •  glipizide (GLUCOTROL XL) 5 MG ER tablet, , Disp: , Rfl:   •  levocetirizine (XYZAL) 5 MG tablet, Take 1 tablet by mouth As Needed for Allergies., Disp: , Rfl:   •  magnesium oxide (MAGOX) 400 (241.3 Mg) MG tablet tablet, Take 1 tablet by mouth Daily., Disp: , Rfl:   •  metoprolol succinate XL (TOPROL-XL) 50 MG 24 hr tablet, Take 1 tablet by mouth Daily., Disp: , Rfl:   •  Multiple Vitamins-Minerals (MULTIVITAMIN WITH MINERALS) tablet tablet, Take 1 tablet by mouth Daily., Disp: , Rfl:   •  OnabotulinumtoxinA 200 units reconstituted solution, FOR . PHYSICIAN TO INJECT 155 UNITS INTRAMUSCULARLY INTO HEAD, NECK AND SHOULDERS EVERY 12 WEEKS Per FDA PROTOCOL, Disp: 1 each, Rfl: 3  •  pantoprazole (PROTONIX) 40 MG EC tablet, Take 1 tablet by mouth Daily., Disp: , Rfl:   •  promethazine (PHENERGAN) 25 MG tablet, Take 1 tablet by mouth As Needed for Nausea (migraine)., Disp: , Rfl:   •  Riboflavin 100 MG tablet, Take 100 mg by mouth Daily., Disp: , Rfl:   •  rizatriptan (MAXALT) 10 MG tablet, Take 1 tablet by mouth 1 (One) Time As Needed for Migraine. May repeat in 2 hours if needed, Disp: , Rfl:   •  spironolactone (ALDACTONE) 25 MG tablet, Take 1 tablet by mouth Daily., Disp: , Rfl:   •  traMADol (ULTRAM) 50 MG tablet, Take 1 tablet by mouth Every 8 (Eight) Hours As Needed for Moderate Pain., Disp: , Rfl:     Allergies:   Allergies   Allergen Reactions   • Penicillins Anaphylaxis   • Vancomycin Itching     Topical itching when Vancomycin solution came into contact with  "skin (not a systemic reaction).    **TOLERATED VANC DURING Feb 2020 ADMISSION**   • Cefaclor Itching   • Hydrocodone Itching   • Oxycodone-Acetaminophen Itching       Objective     Physical Exam:  Vital Signs:   Vitals:    06/02/23 1419   Weight: 103 kg (227 lb 6.4 oz)   Height: 160 cm (62.99\")     Body mass index is 40.29 kg/m².     Physical Exam  Alert and oriented x 3  BOTOX PROCEDURE:     The patient was placed in a comfortable area and the sites to be treated were identified. A time out was called and performed. The area to be treated was prepped three times with alcohol and the alcohol allowed to dry. Next, a 30 gauge needle was used to inject the medication in the area to be treated.     Date of Last Injection: years ago  Response: good  Onset of response: 1 week   Wearing off: yes  Side Effects: no  :  Medlanes  Lot #: [id number]  Expiration Date: 02/2026  NDC: 81909es92    Botox is pt supplied    Botox was injected using FDA approved protocol for chronic migraine prevention.   200 unit vial Botox was re-constituted with 4 mL 0.9 NS to 5 unit/0.1 mL using standard techniques.  10 units were given at the  muscle in 2 divided sites  5 units were given at the Procerus muscle  20 units were given at the Frontalis muscle in 4 divided sites  50 units were given at the Temporalis muscle in 10 divided sites  30 units were given at the Occipitalis muscle in 6 divided sites  20 units were given at the Cervical paraspinal muscle in 4 divided sites      The total amount injected in units is 135  The total amount wasted in units is 65  The total amount submitted in units is 200.   Botox was administered by Nurse practitioner.     Patient tolerated procedure well with no immediate complications.     Assessment / Plan      Assessment/Plan:   Diagnoses and all orders for this visit:    1. Migraine with aura and without status migrainosus, not intractable (Primary)  Comments:  Cont botox         Patient " Education:       Reviewed medications, potential side effects and signs and symptoms to report. Discussed risk versus benefits of treatment plan with patient and/or family-including medications, labs and radiology that may be ordered. Addressed questions and concerns during visit. Patient and/or family verbalized understanding and agree with plan. Instructed to call the office with any questions and report to ER with any life-threatening symptoms.     Follow Up:   It has been determined that this patient has chronic migraine headaches. We will proceed with a 12 week regimen of 200 units of Botox injections, to treat the patient's chronic migraines.  Return in about 3 months (around 9/2/2023) for Botox.    During this visit the following were done:  Labs Reviewed []    Labs Ordered []    Radiology Reports Reviewed []    Radiology Ordered []    PCP Records Reviewed []    Referring Provider Records Reviewed []    ER Records Reviewed []    Hospital Records Reviewed []    History Obtained From Family []    Radiology Images Reviewed []    Other Reviewed []    Records Requested []        Ayla Chávez, DNP, APRN

## 2023-08-31 ENCOUNTER — DOCUMENTATION (OUTPATIENT)
Dept: ONCOLOGY | Facility: HOSPITAL | Age: 63
End: 2023-08-31
Payer: MEDICARE

## 2023-09-07 ENCOUNTER — OFFICE VISIT (OUTPATIENT)
Dept: ORTHOPEDIC SURGERY | Facility: CLINIC | Age: 63
End: 2023-09-07
Payer: MEDICARE

## 2023-09-07 VITALS
DIASTOLIC BLOOD PRESSURE: 82 MMHG | HEIGHT: 63 IN | WEIGHT: 212 LBS | SYSTOLIC BLOOD PRESSURE: 120 MMHG | BODY MASS INDEX: 37.56 KG/M2

## 2023-09-07 DIAGNOSIS — M17.12 PRIMARY OSTEOARTHRITIS OF LEFT KNEE: Primary | ICD-10-CM

## 2023-09-07 PROCEDURE — 99213 OFFICE O/P EST LOW 20 MIN: CPT | Performed by: ORTHOPAEDIC SURGERY

## 2023-09-07 RX ORDER — ALBUTEROL SULFATE 90 UG/1
AEROSOL, METERED RESPIRATORY (INHALATION)
COMMUNITY
Start: 2023-07-12

## 2023-09-07 RX ORDER — TIRZEPATIDE 5 MG/.5ML
INJECTION, SOLUTION SUBCUTANEOUS
COMMUNITY
Start: 2023-09-02
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
## 2023-09-18 ENCOUNTER — TELEPHONE (OUTPATIENT)
Dept: NEUROLOGY | Facility: CLINIC | Age: 63
End: 2023-09-18
Payer: MEDICARE

## 2023-09-18 NOTE — TELEPHONE ENCOUNTER
"Coronavirus (COVID-19) Notification    Caller Name (Patient, parent, daughter/son, grandparent, etc)  Patient    Reason for call  Notify of Positive Coronavirus (COVID-19) lab results, assess symptoms,  review [facility] recommendations    Lab Result    Lab test:  2019-nCoV rRt-PCR or SARS-CoV-2 PCR    Oropharyngeal AND/OR nasopharyngeal swabs is POSITIVE for 2019-nCoV RNA/SARS-COV-2 PCR (COVID-19 virus)    RN Recommendations/Instructions per [facility] Coronavirus COVID-19 recommendations    Brief introduction script  Introduce self then review script:  \"I am calling on behalf of [facility].  We were notified that your Coronavirus test (COVID-19) for was POSITIVE for the virus.  I have some information to relay to you but first I wanted to mention that the [place] Dept of Health will be contacting you shortly [it's possible MD already called Patient] to talk to you more about how you are feeling and other people you have had contact with who might now also have the virus.  Also, [facility] is Partnering with the [facility] for Covid-19 research, you may be contacted directly by research staff.\"    Assessment (Inquire about Patient's current symptoms)   Assessment   Current Symptoms at time of phone call: (if no symptoms, document No symptoms] Headache, congestion, cough   Symptoms onset (if applicable) 11/5/20     If at time of call, Patients symptoms hare worsened, the Patient should contact 911 or have someone drive them to Emergency Dept promptly:      If Patient calling 911, inform 911 personal that you have tested positive for the Coronavirus (COVID-19).  Place mask on and await 911 to arrive.    If Emergency Dept, If possible, please have another adult drive you to the Emergency Dept but you need to wear mask when in contact with other people.      Review information with Patient    Your result was positive. This means you have COVID-19 (coronavirus).  We have sent you a letter " Tried to call patient to reschedule but phone says call cannot be completed.     that reviews the information that I'll be reviewing with you now.    How can I protect others?    If you have symptoms: stay home and away from others (self-isolate) until:    You've had no fever--and no medicine that reduces fever--for 1 full day (24 hours). And       Your other symptoms have gotten better. For example, your cough or breathing has improved. And     At least 10 days have passed since your symptoms started. (If you've been told by a doctor that you have a weak immune system, wait 20 days.)     If you don't have symptoms: Stay home and away from others (self-isolate) until at least 10 days have passed since your first positive COVID-19 test. (Date test collected)    During this time:    Stay in your own room, including for meals. Use your own bathroom if you can.    Stay away from others in your home. No hugging, kissing or shaking hands. No visitors.     Don't go to work, school or anywhere else.     Clean  high touch  surfaces often (doorknobs, counters, handles, etc.). Use a household cleaning spray or wipes. You'll find a full list on the EPA website at www.epa.gov/pesticide-registration/list-n-disinfectants-use-against-sars-cov-2.     Cover your mouth and nose with a mask, tissue or other face covering to avoid spreading germs.    Wash your hands and face often with soap and water.    Caregivers in these groups are at risk for severe illness due to COVID-19:  o People 65 years and older  o People who live in a nursing home or long-term care facility  o People with chronic disease (lung, heart, cancer, diabetes, kidney, liver, immunologic)  o People who have a weakened immune system, including those who:  - Are in cancer treatment  - Take medicine that weakens the immune system, such as corticosteroids  - Had a bone marrow or organ transplant  - Have an immune deficiency  - Have poorly controlled HIV or AIDS  - Are obese (body mass index of 40 or higher)  - Smoke regularly    Caregivers should  wear gloves while washing dishes, handling laundry and cleaning bedrooms and bathrooms.    Wash and dry laundry with special caution. Don't shake dirty laundry, and use the warmest water setting you can.    If you have a weakened immune system, ask your doctor about other actions you should take.    For more tips, go to www.cdc.gov/coronavirus/2019-ncov/downloads/10Things.pdf.    You should not go back to work until you meet the guidelines above for ending your home isolation. You don't need to be retested for COVID-19 before going back to work--studies show that you won't spread the virus if it's been at least 10 days since your symptoms started (or 20 days, if you have a weak immune system).    Employers: This document serves as formal notice of your employee's medical guidelines for going back to work. They must meet the above guidelines before going back to work in person.    How can I take care of myself?    1. Get lots of rest. Drink extra fluids (unless a doctor has told you not to).    2. Take Tylenol (acetaminophen) for fever or pain. If you have liver or kidney problems, ask your family doctor if it's okay to take Tylenol.     Take either:     650 mg (two 325 mg pills) every 4 to 6 hours, or     1,000 mg (two 500 mg pills) every 8 hours as needed.     Note: Don't take more than 3,000 mg in one day. Acetaminophen is found in many medicines (both prescribed and over-the-counter medicines). Read all labels to be sure you don't take too much.    For children, check the Tylenol bottle for the right dose (based on their age or weight).    3. If you have other health problems (like cancer, heart failure, an organ transplant or severe kidney disease): Call your specialty clinic if you don't feel better in the next 2 days.    4. Know when to call 911: Emergency warning signs include:    Trouble breathing or shortness of breath    Pain or pressure in the chest that doesn't go away    Feeling confused like you  haven't felt before, or not being able to wake up    Bluish-colored lips or face    5. Sign up for [employer]. We know it's scary to hear that you have COVID-19. We want to track your symptoms to make sure you're okay over the next 2 weeks. Please look for an email from [employer]--this is a free, online program that we'll use to keep in touch. To sign up, follow the link in the email. Learn more at [email or web address].    Where can I get more information?    [facility]: [email or web address]    Coronavirus Basics: www.health.[facility].[place]./diseases/coronavirus/basics.html    What to Do If You're Sick: www.cdc.gov/coronavirus/2019-ncov/about/steps-when-sick.html    Ending Home Isolation: www.cdc.gov/coronavirus/2019-ncov/hcp/disposition-in-home-patients.html     Caring for Someone with COVID-19: www.cdc.gov/coronavirus/2019-ncov/if-you-are-sick/care-for-someone.html     [facility] clinical trials (COVID-19 research studies): [email or web address]-clinical-trials     A Positive COVID-19 letter will be sent via [employer] or the mail. (Exception, no letters sent to Presurgerical/Preprocedure Patients)    Heather Hogan RN

## 2023-09-18 NOTE — TELEPHONE ENCOUNTER
"  Caller: Jeevan Cardoso \"Neela\"    Relationship: Self    Best call back number: [phone]    What is the best time to reach you:     Who are you requesting to speak with (clinical staff, provider,  specific staff member):     Do you know the name of the person who called: BREONNA    What was the call regarding: RETURNING CALL    Is it okay if the provider responds through [employer]      PLEASE CALL & ADVISE  "

## 2023-09-18 NOTE — TELEPHONE ENCOUNTER
Returned call and phone says not working.  Please transfer to Nicolasa or Shyanne to reschedule Botox appointment.

## 2023-09-18 NOTE — TELEPHONE ENCOUNTER
Caller: GREG    Relationship: SELF    Best call back number:   Telephone Information:   Mobile [phone]       What is the best time to reach you: ANYTIME    Who are you requesting to speak with (clinical staff, provider,  specific staff member): CLINICAL    Do you know the name of the person who called: N/A NO VM WAS LEFT    What was the call regarding: MAYBE REGARDING BOTOX     Is it okay if the provider responds through MyChart: NO, PHONE CALL IS BEST

## 2023-09-21 ENCOUNTER — TELEPHONE (OUTPATIENT)
Dept: NEUROLOGY | Facility: CLINIC | Age: 63
End: 2023-09-21
Payer: MEDICARE

## 2023-09-21 ENCOUNTER — DOCUMENTATION (OUTPATIENT)
Dept: ONCOLOGY | Facility: HOSPITAL | Age: 63
End: 2023-09-21
Payer: MEDICARE

## 2023-10-05 ENCOUNTER — OFFICE VISIT (OUTPATIENT)
Dept: ORTHOPEDIC SURGERY | Facility: CLINIC | Age: 63
End: 2023-10-05
Payer: MEDICARE

## 2023-10-05 VITALS
WEIGHT: 210 LBS | SYSTOLIC BLOOD PRESSURE: 122 MMHG | DIASTOLIC BLOOD PRESSURE: 78 MMHG | HEIGHT: 63 IN | BODY MASS INDEX: 37.21 KG/M2

## 2023-10-05 DIAGNOSIS — E66.09 CLASS 2 OBESITY DUE TO EXCESS CALORIES WITHOUT SERIOUS COMORBIDITY WITH BODY MASS INDEX (BMI) OF 37.0 TO 37.9 IN ADULT: ICD-10-CM

## 2023-10-05 DIAGNOSIS — M17.12 PRIMARY OSTEOARTHRITIS OF LEFT KNEE: Primary | ICD-10-CM

## 2023-10-05 RX ORDER — LIDOCAINE HYDROCHLORIDE 10 MG/ML
3 INJECTION, SOLUTION EPIDURAL; INFILTRATION; INTRACAUDAL; PERINEURAL
Status: COMPLETED | OUTPATIENT
Start: 2023-10-05 | End: 2023-10-05

## 2023-10-05 RX ORDER — TRIAMCINOLONE ACETONIDE 40 MG/ML
80 INJECTION, SUSPENSION INTRA-ARTICULAR; INTRAMUSCULAR
Status: COMPLETED | OUTPATIENT
Start: 2023-10-05 | End: 2023-10-05

## 2023-10-05 RX ORDER — BUPIVACAINE HYDROCHLORIDE 5 MG/ML
1 INJECTION, SOLUTION EPIDURAL; INTRACAUDAL
Status: COMPLETED | OUTPATIENT
Start: 2023-10-05 | End: 2023-10-05

## 2023-10-05 RX ADMIN — BUPIVACAINE HYDROCHLORIDE 1 ML: 5 INJECTION, SOLUTION EPIDURAL; INTRACAUDAL at 14:52

## 2023-10-05 RX ADMIN — TRIAMCINOLONE ACETONIDE 80 MG: 40 INJECTION, SUSPENSION INTRA-ARTICULAR; INTRAMUSCULAR at 14:52

## 2023-10-05 RX ADMIN — LIDOCAINE HYDROCHLORIDE 3 ML: 10 INJECTION, SOLUTION EPIDURAL; INFILTRATION; INTRACAUDAL; PERINEURAL at 14:52
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
## 2023-10-05 NOTE — PROGRESS NOTES
Procedure   - Large Joint Arthrocentesis: L knee on 10/5/2023 2:52 PM  Indications: pain  Details: 21 G needle, superolateral approach  Medications: 3 mL lidocaine PF 1% 1 %; 80 mg triamcinolone acetonide 40 MG/ML; 1 mL bupivacaine (PF) 0.5 %  Outcome: tolerated well, no immediate complications  Procedure, treatment alternatives, risks and benefits explained, specific risks discussed. Consent was given by the patient. Immediately prior to procedure a time out was called to verify the correct patient, procedure, equipment, support staff and site/side marked as required. Patient was prepped and draped in the usual sterile fashion.

## 2023-11-28 ENCOUNTER — TELEPHONE (OUTPATIENT)
Dept: GASTROENTEROLOGY | Facility: CLINIC | Age: 63
End: 2023-11-28
Payer: MEDICARE

## 2023-11-28 ENCOUNTER — TELEPHONE (OUTPATIENT)
Dept: ORTHOPEDIC SURGERY | Facility: CLINIC | Age: 63
End: 2023-11-28
Payer: MEDICARE

## 2023-11-28 DIAGNOSIS — K76.82 HEPATIC ENCEPHALOPATHY: ICD-10-CM

## 2023-11-28 RX ORDER — DICYCLOMINE HYDROCHLORIDE 10 MG/1
CAPSULE ORAL
Qty: 180 CAPSULE | Refills: 3 | Status: SHIPPED | OUTPATIENT
Start: 2023-11-28

## 2023-11-28 NOTE — TELEPHONE ENCOUNTER
Spoke with patient. Informed her that if she would like to proceed with surgery, she will need to schedule a follow up appointment with Dr. Buck. Patient verbalized understanding and I rescheduled her appointment to December 14th at 2:20 pm.

## 2023-11-28 NOTE — TELEPHONE ENCOUNTER
"Caller: Jeevan Cardoso Neela \"Neela\"    Relationship: Self    Best call back number: [phone]    Requested Prescriptions:   Requested Prescriptions     Pending Prescriptions Disp Refills    dicyclomine (BENTYL) 10 MG capsule 180 capsule 3        Pharmacy where request should be sent: [facility] [id number] [facility] [address] AT [address] [phone] [place] [phone] FX [[id number]]     Last office visit with prescribing clinician: 11/30/2022   Last telemedicine visit with prescribing clinician: Visit date not found   Next office visit with prescribing clinician: 12/4/2023     Additional details provided by patient:     Does the patient have less than a 3 day supply:  [x] Yes  [] No    Would you like a call back once the refill request has been completed: [x] Yes [] No    If the office needs to give you a call back, can they leave a voicemail: [x] Yes [] No    Satish Avalos Rep   11/28/23 11:13 EST         "

## 2023-11-28 NOTE — TELEPHONE ENCOUNTER
"    Caller: Jeevan Cardoso \"Neela\"    Relationship to patient: Self    Best call back number:[phone]    Chief complaint: LEFT KNEE     Type of visit: SURGERY     Requested date: ASAP    "

## 2023-11-28 NOTE — TELEPHONE ENCOUNTER
"  Hub staff attempted to follow warm transfer process and was unsuccessful     Caller: Jeevan Cardoso \"Neela\"    Relationship to patient: Self    Best call back number: [phone]    Patient is needing: PATIENT CALLED IN AND STATED THAT SHE WOULD LIKE TO KNOW IF DR. FARLEY CAN SEND HER PRESCRIPTION FOR THE MEDICATION XIFAXAN AGAIN DUE TO HER HAVING A DIFFERENT INSURANCE. SHE STATED THAT SHE HAD [employer] LAST TIME AND THEY WOULD NOT COVER. PATIENT STATED TO SEND PRESCRIPTION TO PHARMACY [facility] [id number] [address] AT [address] [phone] [place] [phone] FX [[id number]]. OKAY TO CALL PATIENT ANYTIME IF NEED, OKAY TO LEAVE .        "

## 2023-11-30 ENCOUNTER — TELEPHONE (OUTPATIENT)
Dept: NEUROLOGY | Facility: CLINIC | Age: 63
End: 2023-11-30

## 2023-11-30 NOTE — TELEPHONE ENCOUNTER
"Caller: Jeevan Cardoso \"Neela\"    Relationship: Self    Best call back number: [phone]    What was the call regarding: PT STATES SHE HAS CHANGED FROM [employer] MEDICARE TO [employer] MEDICARE. SHE IS INTERESTED IN RESCHEDULING HER CANCELLED BOTOX VISIT IN LATE-DECEMBER, HOWEVER, A NEW AUTHORIZATION MAY BE REQUIRED WITH THE NEW INSURANCE.    Do you require a callback: YES, PLEASE.    Is it okay if the provider responds through [employer]?: YES    PLEASE REVIEW AND ADVISE.  "

## 2023-12-11 ENCOUNTER — TELEPHONE (OUTPATIENT)
Dept: NEUROLOGY | Facility: CLINIC | Age: 63
End: 2023-12-11
Payer: MEDICARE

## 2023-12-11 NOTE — TELEPHONE ENCOUNTER
Provider: MEGAN DAS    Caller: WESLY    Relationship to Patient: [employer]    Phone Number: [phone]    Reason for Call: WESLY FROM [employer] IS WORKING ON THE PA FOR THE BOTOX.  SHE NEEDS TO KNOW IF THE BOTOX IS GOING TO BE USED FOR MIGRAINE PREVENTION.  PLEASE CALL TO ADVISE.      THANK YOU

## 2023-12-12 ENCOUNTER — SPECIALTY PHARMACY (OUTPATIENT)
Dept: ONCOLOGY | Facility: HOSPITAL | Age: 63
End: 2023-12-12
Payer: MEDICARE

## 2023-12-14 ENCOUNTER — OFFICE VISIT (OUTPATIENT)
Dept: ORTHOPEDIC SURGERY | Facility: CLINIC | Age: 63
End: 2023-12-14
Payer: MEDICARE

## 2023-12-14 VITALS
WEIGHT: 196 LBS | DIASTOLIC BLOOD PRESSURE: 74 MMHG | SYSTOLIC BLOOD PRESSURE: 118 MMHG | BODY MASS INDEX: 34.73 KG/M2 | HEIGHT: 63 IN

## 2023-12-14 DIAGNOSIS — M17.12 PRIMARY OSTEOARTHRITIS OF LEFT KNEE: Primary | ICD-10-CM

## 2023-12-14 PROBLEM — M17.10 ARTHRITIS OF KNEE: Status: ACTIVE | Noted: 2023-12-14

## 2023-12-14 PROCEDURE — 99214 OFFICE O/P EST MOD 30 MIN: CPT | Performed by: ORTHOPAEDIC SURGERY

## 2023-12-14 RX ORDER — TIRZEPATIDE 7.5 MG/.5ML
INJECTION, SOLUTION SUBCUTANEOUS
COMMUNITY
Start: 2023-11-30

## 2023-12-14 RX ORDER — METHOCARBAMOL 500 MG/1
TABLET, FILM COATED ORAL AS NEEDED
COMMUNITY
Start: 2023-12-01

## 2023-12-14 RX ORDER — MELOXICAM 7.5 MG/1
15 TABLET ORAL ONCE
OUTPATIENT
Start: 2023-12-14 | End: 2023-12-14

## 2023-12-14 RX ORDER — ACETAMINOPHEN 325 MG/1
1000 TABLET ORAL ONCE
OUTPATIENT
Start: 2023-12-14 | End: 2023-12-14

## 2023-12-14 RX ORDER — PREGABALIN 75 MG/1
75 CAPSULE ORAL ONCE
OUTPATIENT
Start: 2023-12-14 | End: 2023-12-14

## 2023-12-14 RX ORDER — CHLORHEXIDINE GLUCONATE 4 G/100ML
SOLUTION TOPICAL DAILY PRN
Qty: 236 ML | Refills: 0 | Status: SHIPPED | OUTPATIENT
Start: 2023-12-14
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
## 2023-12-18 ENCOUNTER — SPECIALTY PHARMACY (OUTPATIENT)
Dept: ONCOLOGY | Facility: HOSPITAL | Age: 63
End: 2023-12-18
Payer: MEDICARE

## 2023-12-19 ENCOUNTER — SPECIALTY PHARMACY (OUTPATIENT)
Dept: ONCOLOGY | Facility: HOSPITAL | Age: 63
End: 2023-12-19
Payer: MEDICARE

## 2024-01-04 ENCOUNTER — TELEPHONE (OUTPATIENT)
Dept: ONCOLOGY | Facility: CLINIC | Age: 64
End: 2024-01-04

## 2024-01-04 NOTE — TELEPHONE ENCOUNTER
"  Caller: Jeevan Cardoso \"Neela\"    Relationship: Self    Best call back number: [phone]    What is the best time to reach you: AFTERNOONS ARE BETTER    Who are you requesting to speak with (clinical staff, provider,  specific staff member): CLINICAL    What was the call regarding: PATIENT CALLED TO LET DR CHICAS KNOW THAT SHE IS HAVING KNEE REPLACEMENT SURGERY ON FEB 21ST AND NEEDS MEDICATION INSTRUCTIONS ON WHAT SHE IS SUPPOSED TO BE TAKING.     Is it okay if the provider responds through MyChart: NO        "

## 2024-01-04 NOTE — TELEPHONE ENCOUNTER
Discussed with Dr. Day and as long as platelets are staying above 80,000 he would not recommend any medication, if orthopedic surgeon wants platelets higher than that he would need to let us know. Patient needs to have labs checked a week prior to surgery to ensure that platelets are staying >80. Called patient to discuss and got no answer and no VM.

## 2024-01-15 ENCOUNTER — OFFICE VISIT (OUTPATIENT)
Dept: SLEEP MEDICINE | Facility: CLINIC | Age: 64
End: 2024-01-15
Payer: MEDICARE

## 2024-01-15 VITALS
WEIGHT: 146.3 LBS | SYSTOLIC BLOOD PRESSURE: 124 MMHG | OXYGEN SATURATION: 97 % | BODY MASS INDEX: 25.92 KG/M2 | TEMPERATURE: 97.5 F | HEART RATE: 79 BPM | HEIGHT: 63 IN | DIASTOLIC BLOOD PRESSURE: 68 MMHG

## 2024-01-15 DIAGNOSIS — G47.33 OSA (OBSTRUCTIVE SLEEP APNEA): Primary | ICD-10-CM

## 2024-01-15 DIAGNOSIS — G47.10 HYPERSOMNOLENCE: ICD-10-CM

## 2024-01-15 DIAGNOSIS — Z72.821 POOR SLEEP HYGIENE: ICD-10-CM
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
## 2024-01-16 NOTE — TELEPHONE ENCOUNTER
[employer] message sent with detailed information, patient read the message on 1/9/24.

## 2024-02-07 ENCOUNTER — PRE-ADMISSION TESTING (OUTPATIENT)
Dept: PREADMISSION TESTING | Facility: HOSPITAL | Age: 64
End: 2024-02-07
Payer: MEDICARE

## 2024-02-07 VITALS — BODY MASS INDEX: 34.96 KG/M2 | WEIGHT: 190 LBS | HEIGHT: 62 IN

## 2024-02-07 DIAGNOSIS — M17.12 PRIMARY OSTEOARTHRITIS OF LEFT KNEE: ICD-10-CM

## 2024-02-07 LAB
ALBUMIN SERPL-MCNC: 3.6 G/DL (ref 3.5–5.2)
ALBUMIN/GLOB SERPL: 1 G/DL
ALP SERPL-CCNC: 85 U/L (ref 39–117)
ALT SERPL W P-5'-P-CCNC: 15 U/L (ref 1–33)
ANION GAP SERPL CALCULATED.3IONS-SCNC: 11 MMOL/L (ref 5–15)
APTT PPP: 28.7 SECONDS (ref 22–39)
AST SERPL-CCNC: 31 U/L (ref 1–32)
BASOPHILS # BLD AUTO: 0 10*3/MM3 (ref 0–0.2)
BASOPHILS NFR BLD AUTO: 0 % (ref 0–1.5)
BILIRUB SERPL-MCNC: 0.9 MG/DL (ref 0–1.2)
BUN SERPL-MCNC: 33 MG/DL (ref 8–23)
BUN/CREAT SERPL: 22.3 (ref 7–25)
CALCIUM SPEC-SCNC: 9.3 MG/DL (ref 8.6–10.5)
CHLORIDE SERPL-SCNC: 104 MMOL/L (ref 98–107)
CO2 SERPL-SCNC: 24 MMOL/L (ref 22–29)
CREAT SERPL-MCNC: 1.48 MG/DL (ref 0.57–1)
DEPRECATED RDW RBC AUTO: 53.3 FL (ref 37–54)
EGFRCR SERPLBLD CKD-EPI 2021: 40.1 ML/MIN/1.73
EOSINOPHIL # BLD AUTO: 0 10*3/MM3 (ref 0–0.4)
EOSINOPHIL NFR BLD AUTO: 0 % (ref 0.3–6.2)
ERYTHROCYTE [DISTWIDTH] IN BLOOD BY AUTOMATED COUNT: 14.6 % (ref 12.3–15.4)
GLOBULIN UR ELPH-MCNC: 3.7 GM/DL
GLUCOSE SERPL-MCNC: 147 MG/DL (ref 65–99)
HBA1C MFR BLD: 4.3 % (ref 4.8–5.6)
HCT VFR BLD AUTO: 39.1 % (ref 34–46.6)
HGB BLD-MCNC: 12.6 G/DL (ref 12–15.9)
IMM GRANULOCYTES # BLD AUTO: 0.05 10*3/MM3 (ref 0–0.05)
IMM GRANULOCYTES NFR BLD AUTO: 0.6 % (ref 0–0.5)
INR PPP: 1.29 (ref 0.89–1.12)
LYMPHOCYTES # BLD AUTO: 0.57 10*3/MM3 (ref 0.7–3.1)
LYMPHOCYTES NFR BLD AUTO: 6.3 % (ref 19.6–45.3)
MCH RBC QN AUTO: 32.3 PG (ref 26.6–33)
MCHC RBC AUTO-ENTMCNC: 32.2 G/DL (ref 31.5–35.7)
MCV RBC AUTO: 100.3 FL (ref 79–97)
MONOCYTES # BLD AUTO: 0.17 10*3/MM3 (ref 0.1–0.9)
MONOCYTES NFR BLD AUTO: 1.9 % (ref 5–12)
NEUTROPHILS NFR BLD AUTO: 8.26 10*3/MM3 (ref 1.7–7)
NEUTROPHILS NFR BLD AUTO: 91.2 % (ref 42.7–76)
NRBC BLD AUTO-RTO: 0 /100 WBC (ref 0–0.2)
PLATELET # BLD AUTO: 95 10*3/MM3 (ref 140–450)
PMV BLD AUTO: 9.7 FL (ref 6–12)
POTASSIUM SERPL-SCNC: 4.7 MMOL/L (ref 3.5–5.2)
PROT SERPL-MCNC: 7.3 G/DL (ref 6–8.5)
PROTHROMBIN TIME: 16.2 SECONDS (ref 12.2–14.5)
QT INTERVAL: 412 MS
QTC INTERVAL: 444 MS
RBC # BLD AUTO: 3.9 10*6/MM3 (ref 3.77–5.28)
SODIUM SERPL-SCNC: 139 MMOL/L (ref 136–145)
WBC NRBC COR # BLD AUTO: 9.05 10*3/MM3 (ref 3.4–10.8)

## 2024-02-07 PROCEDURE — 93005 ELECTROCARDIOGRAM TRACING: CPT

## 2024-02-07 PROCEDURE — 36415 COLL VENOUS BLD VENIPUNCTURE: CPT

## 2024-02-07 PROCEDURE — 80053 COMPREHEN METABOLIC PANEL: CPT

## 2024-02-07 PROCEDURE — 85610 PROTHROMBIN TIME: CPT

## 2024-02-07 PROCEDURE — G0480 DRUG TEST DEF 1-7 CLASSES: HCPCS

## 2024-02-07 PROCEDURE — 83036 HEMOGLOBIN GLYCOSYLATED A1C: CPT

## 2024-02-07 PROCEDURE — 85730 THROMBOPLASTIN TIME PARTIAL: CPT

## 2024-02-07 PROCEDURE — 85025 COMPLETE CBC W/AUTO DIFF WBC: CPT

## 2024-02-07 PROCEDURE — 93010 ELECTROCARDIOGRAM REPORT: CPT | Performed by: INTERNAL MEDICINE

## 2024-02-07 RX ORDER — HYDROCODONE BITARTRATE AND ACETAMINOPHEN 5; 325 MG/1; MG/1
2 TABLET ORAL EVERY 6 HOURS PRN
COMMUNITY

## 2024-02-07 RX ORDER — CHLORPHENIRAMINE MALEATE 4 MG/1
4 TABLET ORAL EVERY 6 HOURS PRN
COMMUNITY

## 2024-02-07 NOTE — DISCHARGE INSTRUCTIONS
Prescription for Chlorhexidine shower called into patient's pharmacy or [facility] pharmacy by patient's surgeon.  Reinforced with patient to  the prescription from applicable pharmacy if they haven't already.  Verbal and written instructions given regarding proper use of Chlorhexidine body wash to patient and/or famlily during PAT visit. Patient/family also instructed to complete checklist and return it to Pre-op on the day of surgery.  Patient and/or family verbalized understanding.     Patient to apply Chlorhexadine wipes  to surgical area (as instructed) the night before procedure and the AM of procedure. Wipes provided.     Patient instructed to drink 20 ounces of Gatorade or Gatorlyte (if diabetic) and it needs to be completed 1 hour (for Main OR patients) or 2 hours (scheduled  section & [facility] patients) before given arrival time for procedure (NO RED Gatorade and NO Gatorade Zero).    Patient verbalized understanding.     Patient instructed to remove all jewelry for procedure.  Patient was instructed that if unable to remove jewelry especially rings to request assistance from a jeweler. Explained that if the piece of jewelry could not be removed before arrival to preop that it will be cut off.  Reinforced with patient that all jewelry must be removed for safety reasons that taping a ring was not an option.  Patient verbalized understanding.     Discussed with patient options for receiving total joint replacement education and assessed patient's ability and preference. Joint Replacement Guide given to patient during PAT visit since not received a copy within the last year. Encouraged patient/family to read guide thoroughly and notify PAT staff with any questions or concerns. Handout provided directing patient to links to watch online videos related to joint replacement surgery on the [facility] website. The handout gives detailed instructions for joining an online joint replacement class  through Zoom or phone conference offered on Thursdays. Patient agreed to participate by watching videos online. Patient verbalized understanding of instructions and to complete the online learning tool survey. Encouraged to share information with family and/or . An overview of the joint replacement education was provided during the visit including general perioperative instructions that are routine for all surgical patients (PAT PASS, wipes, directions to pre-op, etc.).

## 2024-02-07 NOTE — PAT
Pt is followed by Dr Day for ITP. She reports that he would like to be consulted if plt less than 80.     Pt had dental extractins 2/5/2024. She experienced a 12 hour nosebleed post op . No interventions were required.     Pt to contact PCP for dosing instructions for GLP1     An arrival time for procedure was not provided during PAT visit. If patient had any questions or concerns about their arrival time, they were instructed to contact their surgeon/physician.  Additionally, if the patient referred to an arrival time that was acquired from their my chart account, patient was encouraged to verify that time with their surgeon/physician. Arrival times are NOT provided in Pre Admission Testing Department.     Patient denies any current skin issues.      Patient instructed to remove all jewelry for procedure.  Patient was instructed that if unable to remove jewelry especially rings to request assistance from a jeweler. Explained that if the piece of jewelry could not be removed before arrival to preop that it will be cut off.  Reinforced with patient that all jewelry must be removed for safety reasons that taping a ring was not an option.  Patient verbalized understanding.     It was noted during Pre Admission Testing that patient was wearing some form of fingernail polish (gel/regular) and/or acrylic/artificial nails.  Patient was told that polish and/or artificial nails must be removed for surgery.  If a patient had recent manicure, and would rather not remove polish or artificial nails. Then the minimum requirement is that the polish/artificial nails must be removed from the middle finger on each hand.  Patient verbalized understanding.    If patient was having surgery on an upper extremity, then the patient was instructed that fingernail polish/artificial fingernails must be removed for surgery.  NO EXCEPTIONS.  Patient verbalized understanding.    If patient was having surgery on a lower extremity, then the  patient was instructed that toenail polish on both extremities must be removed for surgery.  NO EXCEPTIONS. Patient verbalized understanding.     Prescription for Chlorhexidine shower called into patient's pharmacy or [facility] pharmacy by patient's surgeon.  Reinforced with patient to  the prescription from applicable pharmacy if they haven't already.  Verbal and written instructions given regarding proper use of Chlorhexidine body wash to patient and/or famlily during PAT visit. Patient/family also instructed to complete checklist and return it to Pre-op on the day of surgery.  Patient and/or family verbalized understanding.     Patient instructed to drink 20 ounces of Gatorade or Gatorlyte (if diabetic) and it needs to be completed 1 hour (for Main OR patients) or 2 hours ([facility]) before given arrival time for procedure (NO RED Gatorade and NO Gatorade Zero).    Patient verbalized understanding.     Discussed with patient options for receiving total joint replacement education and assessed patient's ability and preference. Joint Replacement Guide given to patient during PAT visit since not received a copy within the last year. Encouraged patient/family to read guide thoroughly and notify PAT staff with any questions or concerns. Handout provided directing patient to links to watch online videos related to joint replacement surgery on the [facility] website. The handout gives detailed instructions for joining an online joint replacement class through Zoom or phone conference offered on . Patient agreed to participate by watching videos online. Patient verbalized understanding of instructions and to complete the online learning tool survey. Encouraged to share information with family and/or . An overview of the joint replacement education was provided during the visit including general perioperative instructions that are routine for all surgical patients  (PAT PASS, wipes, directions to pre-op, etc.).

## 2024-02-12 ENCOUNTER — TELEPHONE (OUTPATIENT)
Dept: ONCOLOGY | Facility: CLINIC | Age: 64
End: 2024-02-12
Payer: MEDICARE

## 2024-02-12 LAB
COTININE SERPL-MCNC: <1 NG/ML
NICOTINE SERPL-MCNC: <1 NG/ML

## 2024-02-15 DIAGNOSIS — M17.12 PRIMARY OSTEOARTHRITIS OF LEFT KNEE: Primary | ICD-10-CM

## 2024-02-16 ENCOUNTER — HOSPITAL ENCOUNTER (OUTPATIENT)
Dept: CT IMAGING | Facility: HOSPITAL | Age: 64
Discharge: HOME | End: 2024-02-16
Payer: MEDICARE

## 2024-02-16 DIAGNOSIS — M17.12 PRIMARY OSTEOARTHRITIS OF LEFT KNEE: ICD-10-CM

## 2024-02-16 PROCEDURE — 73700 CT LOWER EXTREMITY W/O DYE: CPT

## 2024-02-20 ENCOUNTER — ANESTHESIA EVENT (OUTPATIENT)
Dept: PERIOP | Facility: HOSPITAL | Age: 64
End: 2024-02-20
Payer: MEDICARE

## 2024-02-21 ENCOUNTER — APPOINTMENT (OUTPATIENT)
Dept: GENERAL RADIOLOGY | Facility: HOSPITAL | Age: 64
DRG: 470 | End: 2024-02-21
Payer: MEDICARE

## 2024-02-21 ENCOUNTER — ANESTHESIA EVENT CONVERTED (OUTPATIENT)
Dept: ANESTHESIOLOGY | Facility: HOSPITAL | Age: 64
DRG: 470 | End: 2024-02-21
Payer: MEDICARE

## 2024-02-21 ENCOUNTER — ANESTHESIA (OUTPATIENT)
Dept: PERIOP | Facility: HOSPITAL | Age: 64
End: 2024-02-21
Payer: MEDICARE

## 2024-02-21 ENCOUNTER — HOSPITAL ENCOUNTER (INPATIENT)
Facility: HOSPITAL | Age: 64
LOS: 3 days | Discharge: NURSING, REHAB OR LONG-TERM CARE | DRG: 470 | End: 2024-02-26
Attending: ORTHOPAEDIC SURGERY | Admitting: ORTHOPAEDIC SURGERY
Payer: MEDICARE

## 2024-02-21 DIAGNOSIS — M17.12 PRIMARY OSTEOARTHRITIS OF LEFT KNEE: ICD-10-CM

## 2024-02-21 DIAGNOSIS — Z96.652 S/P TKR (TOTAL KNEE REPLACEMENT), LEFT: Primary | ICD-10-CM

## 2024-02-21 PROBLEM — M17.10 ARTHRITIS OF KNEE: Status: RESOLVED | Noted: 2023-12-14 | Resolved: 2024-02-21

## 2024-02-21 PROBLEM — M17.10 ARTHRITIS OF KNEE: Status: ACTIVE | Noted: 2024-02-21

## 2024-02-21 LAB
GLUCOSE BLDC GLUCOMTR-MCNC: 112 MG/DL (ref 70–130)
GLUCOSE BLDC GLUCOMTR-MCNC: 128 MG/DL (ref 70–130)
GLUCOSE BLDC GLUCOMTR-MCNC: 245 MG/DL (ref 70–130)
POTASSIUM SERPL-SCNC: 3.4 MMOL/L (ref 3.5–5.2)

## 2024-02-21 PROCEDURE — 25010000002 FENTANYL CITRATE (PF) 50 MCG/ML SOLUTION: Performed by: NURSE ANESTHETIST, CERTIFIED REGISTERED

## 2024-02-21 PROCEDURE — 25810000003 SODIUM CHLORIDE 0.9 % SOLUTION: Performed by: NURSE PRACTITIONER

## 2024-02-21 PROCEDURE — 0SRD06A REPLACEMENT OF LEFT KNEE JOINT WITH OXIDIZED ZIRCONIUM ON POLYETHYLENE SYNTHETIC SUBSTITUTE, UNCEMENTED, OPEN APPROACH: ICD-10-PCS | Performed by: ORTHOPAEDIC SURGERY

## 2024-02-21 PROCEDURE — 25010000002 ONDANSETRON PER 1 MG: Performed by: ANESTHESIOLOGY

## 2024-02-21 PROCEDURE — C1776 JOINT DEVICE (IMPLANTABLE): HCPCS | Performed by: ORTHOPAEDIC SURGERY

## 2024-02-21 PROCEDURE — 25010000002 ROPIVACAINE HCL-NACL 0.2-0.9 % SOLUTION: Performed by: NURSE ANESTHETIST, CERTIFIED REGISTERED

## 2024-02-21 PROCEDURE — G0378 HOSPITAL OBSERVATION PER HR: HCPCS

## 2024-02-21 PROCEDURE — 25010000002 ROPIVACAINE PER 1 MG: Performed by: ORTHOPAEDIC SURGERY

## 2024-02-21 PROCEDURE — 82948 REAGENT STRIP/BLOOD GLUCOSE: CPT

## 2024-02-21 PROCEDURE — 20985 CPTR-ASST DIR MS PX: CPT

## 2024-02-21 PROCEDURE — 25010000002 VANCOMYCIN 10 G RECONSTITUTED SOLUTION: Performed by: ORTHOPAEDIC SURGERY

## 2024-02-21 PROCEDURE — 25010000002 PROPOFOL 10 MG/ML EMULSION: Performed by: ANESTHESIOLOGY

## 2024-02-21 PROCEDURE — 20985 CPTR-ASST DIR MS PX: CPT | Performed by: ORTHOPAEDIC SURGERY

## 2024-02-21 PROCEDURE — 25010000002 BUPIVACAINE (PF) 0.25 % SOLUTION: Performed by: ORTHOPAEDIC SURGERY

## 2024-02-21 PROCEDURE — 25010000002 MORPHINE PER 10 MG: Performed by: ORTHOPAEDIC SURGERY

## 2024-02-21 PROCEDURE — 25010000002 SUGAMMADEX 200 MG/2ML SOLUTION: Performed by: ANESTHESIOLOGY

## 2024-02-21 PROCEDURE — 27447 TOTAL KNEE ARTHROPLASTY: CPT

## 2024-02-21 PROCEDURE — 25010000002 DEXAMETHASONE PER 1 MG: Performed by: ANESTHESIOLOGY

## 2024-02-21 PROCEDURE — 25010000002 CLINDAMYCIN PER 300 MG: Performed by: ORTHOPAEDIC SURGERY

## 2024-02-21 PROCEDURE — 25010000002 CLINDAMYCIN 900 MG/50ML SOLUTION: Performed by: ORTHOPAEDIC SURGERY

## 2024-02-21 PROCEDURE — 84132 ASSAY OF SERUM POTASSIUM: CPT | Performed by: ANESTHESIOLOGY

## 2024-02-21 PROCEDURE — 63710000001 INSULIN DETEMIR PER 5 UNITS: Performed by: INTERNAL MEDICINE

## 2024-02-21 PROCEDURE — A4648 IMPLANTABLE TISSUE MARKER: HCPCS | Performed by: ORTHOPAEDIC SURGERY

## 2024-02-21 PROCEDURE — 25810000003 SODIUM CHLORIDE 0.9 % SOLUTION: Performed by: ORTHOPAEDIC SURGERY

## 2024-02-21 PROCEDURE — 27447 TOTAL KNEE ARTHROPLASTY: CPT | Performed by: ORTHOPAEDIC SURGERY

## 2024-02-21 PROCEDURE — 25010000002 FENTANYL CITRATE (PF) 100 MCG/2ML SOLUTION: Performed by: ANESTHESIOLOGY

## 2024-02-21 PROCEDURE — 73560 X-RAY EXAM OF KNEE 1 OR 2: CPT

## 2024-02-21 PROCEDURE — 25010000002 ROPIVACAINE HCL-NACL 0.2-0.9 % SOLUTION: Performed by: ORTHOPAEDIC SURGERY

## 2024-02-21 PROCEDURE — 25010000002 FENTANYL CITRATE (PF) 50 MCG/ML SOLUTION

## 2024-02-21 PROCEDURE — 25010000002 KETOROLAC TROMETHAMINE PER 15 MG: Performed by: ORTHOPAEDIC SURGERY

## 2024-02-21 DEVICE — DEV CONTRL TISS STRATAFIX SPIRAL PDO BIDIR 1 36X36CM: Type: IMPLANTABLE DEVICE | Site: KNEE | Status: FUNCTIONAL

## 2024-02-21 DEVICE — PAT TRIATH TRITANIUM MTL BK ASYM 29X33X9MM: Type: IMPLANTABLE DEVICE | Site: KNEE | Status: FUNCTIONAL

## 2024-02-21 DEVICE — IMPLANTABLE DEVICE: Type: IMPLANTABLE DEVICE | Site: KNEE | Status: FUNCTIONAL

## 2024-02-21 DEVICE — BASEPLT TIB TRIATH TRITANIUM SZ3: Type: IMPLANTABLE DEVICE | Site: KNEE | Status: FUNCTIONAL

## 2024-02-21 DEVICE — INSRT TIB/KN TRIATHLON CONDY/STBL X3 SZ3 9MM: Type: IMPLANTABLE DEVICE | Site: KNEE | Status: FUNCTIONAL

## 2024-02-21 RX ORDER — ONDANSETRON 2 MG/ML
4 INJECTION INTRAMUSCULAR; INTRAVENOUS EVERY 6 HOURS PRN
Status: DISCONTINUED | OUTPATIENT
Start: 2024-02-21 | End: 2024-02-26 | Stop reason: HOSPADM

## 2024-02-21 RX ORDER — ALPRAZOLAM 0.5 MG/1
0.5 TABLET ORAL 3 TIMES DAILY PRN
Status: DISCONTINUED | OUTPATIENT
Start: 2024-02-21 | End: 2024-02-26 | Stop reason: HOSPADM

## 2024-02-21 RX ORDER — FENTANYL CITRATE 50 UG/ML
INJECTION, SOLUTION INTRAMUSCULAR; INTRAVENOUS
Status: COMPLETED
Start: 2024-02-21 | End: 2024-02-21

## 2024-02-21 RX ORDER — ACETAMINOPHEN 500 MG
1000 TABLET ORAL ONCE
Status: DISCONTINUED | OUTPATIENT
Start: 2024-02-21 | End: 2024-02-21

## 2024-02-21 RX ORDER — TRANEXAMIC ACID 10 MG/ML
1000 INJECTION, SOLUTION INTRAVENOUS ONCE
Status: COMPLETED | OUTPATIENT
Start: 2024-02-21 | End: 2024-02-21

## 2024-02-21 RX ORDER — SUCCINYLCHOLINE/SOD CL,ISO/PF 200MG/10ML
SYRINGE (ML) INTRAVENOUS AS NEEDED
Status: DISCONTINUED | OUTPATIENT
Start: 2024-02-21 | End: 2024-02-21 | Stop reason: SURG

## 2024-02-21 RX ORDER — SODIUM CHLORIDE, SODIUM LACTATE, POTASSIUM CHLORIDE, CALCIUM CHLORIDE 600; 310; 30; 20 MG/100ML; MG/100ML; MG/100ML; MG/100ML
9 INJECTION, SOLUTION INTRAVENOUS CONTINUOUS
Status: DISCONTINUED | OUTPATIENT
Start: 2024-02-21 | End: 2024-02-21

## 2024-02-21 RX ORDER — TRANEXAMIC ACID 10 MG/ML
1000 INJECTION, SOLUTION INTRAVENOUS ONCE
Status: DISCONTINUED | OUTPATIENT
Start: 2024-02-21 | End: 2024-02-21 | Stop reason: HOSPADM

## 2024-02-21 RX ORDER — FENTANYL CITRATE 50 UG/ML
INJECTION, SOLUTION INTRAMUSCULAR; INTRAVENOUS AS NEEDED
Status: DISCONTINUED | OUTPATIENT
Start: 2024-02-21 | End: 2024-02-21 | Stop reason: SURG

## 2024-02-21 RX ORDER — MELOXICAM 15 MG/1
15 TABLET ORAL ONCE
Status: COMPLETED | OUTPATIENT
Start: 2024-02-21 | End: 2024-02-21

## 2024-02-21 RX ORDER — BUPIVACAINE HYDROCHLORIDE 2.5 MG/ML
INJECTION, SOLUTION EPIDURAL; INFILTRATION; INTRACAUDAL
Status: COMPLETED | OUTPATIENT
Start: 2024-02-21 | End: 2024-02-21

## 2024-02-21 RX ORDER — ONDANSETRON 4 MG/1
4 TABLET, ORALLY DISINTEGRATING ORAL EVERY 6 HOURS PRN
Status: DISCONTINUED | OUTPATIENT
Start: 2024-02-21 | End: 2024-02-26 | Stop reason: HOSPADM

## 2024-02-21 RX ORDER — METOPROLOL SUCCINATE 50 MG/1
50 TABLET, EXTENDED RELEASE ORAL DAILY
Status: DISCONTINUED | OUTPATIENT
Start: 2024-02-21 | End: 2024-02-23

## 2024-02-21 RX ORDER — NICOTINE POLACRILEX 4 MG
15 LOZENGE BUCCAL
Status: DISCONTINUED | OUTPATIENT
Start: 2024-02-21 | End: 2024-02-26 | Stop reason: HOSPADM

## 2024-02-21 RX ORDER — SODIUM CHLORIDE 0.9 % (FLUSH) 0.9 %
10 SYRINGE (ML) INJECTION EVERY 12 HOURS SCHEDULED
Status: DISCONTINUED | OUTPATIENT
Start: 2024-02-21 | End: 2024-02-21 | Stop reason: HOSPADM

## 2024-02-21 RX ORDER — LIDOCAINE HYDROCHLORIDE 10 MG/ML
INJECTION, SOLUTION EPIDURAL; INFILTRATION; INTRACAUDAL; PERINEURAL AS NEEDED
Status: DISCONTINUED | OUTPATIENT
Start: 2024-02-21 | End: 2024-02-21 | Stop reason: SURG

## 2024-02-21 RX ORDER — ASPIRIN 81 MG/1
81 TABLET ORAL 2 TIMES DAILY
Qty: 60 TABLET | Refills: 0 | Status: SHIPPED | OUTPATIENT
Start: 2024-02-22

## 2024-02-21 RX ORDER — PROPOFOL 10 MG/ML
VIAL (ML) INTRAVENOUS AS NEEDED
Status: DISCONTINUED | OUTPATIENT
Start: 2024-02-21 | End: 2024-02-21 | Stop reason: SURG

## 2024-02-21 RX ORDER — ASPIRIN 81 MG/1
81 TABLET ORAL EVERY 12 HOURS SCHEDULED
Status: DISCONTINUED | OUTPATIENT
Start: 2024-02-22 | End: 2024-02-26 | Stop reason: HOSPADM

## 2024-02-21 RX ORDER — MAGNESIUM HYDROXIDE 1200 MG/15ML
LIQUID ORAL AS NEEDED
Status: DISCONTINUED | OUTPATIENT
Start: 2024-02-21 | End: 2024-02-21 | Stop reason: HOSPADM

## 2024-02-21 RX ORDER — SODIUM CHLORIDE 0.9 % (FLUSH) 0.9 %
3-10 SYRINGE (ML) INJECTION AS NEEDED
Status: DISCONTINUED | OUTPATIENT
Start: 2024-02-21 | End: 2024-02-26 | Stop reason: HOSPADM

## 2024-02-21 RX ORDER — MIDAZOLAM HYDROCHLORIDE 1 MG/ML
1 INJECTION INTRAMUSCULAR; INTRAVENOUS
Status: DISCONTINUED | OUTPATIENT
Start: 2024-02-21 | End: 2024-02-21 | Stop reason: HOSPADM

## 2024-02-21 RX ORDER — DOCUSATE SODIUM 100 MG/1
100 CAPSULE, LIQUID FILLED ORAL 2 TIMES DAILY
Qty: 60 CAPSULE | Refills: 0 | Status: SHIPPED | OUTPATIENT
Start: 2024-02-21

## 2024-02-21 RX ORDER — ACETAMINOPHEN 500 MG
1000 TABLET ORAL EVERY 8 HOURS
Status: DISCONTINUED | OUTPATIENT
Start: 2024-02-21 | End: 2024-02-21

## 2024-02-21 RX ORDER — IBUPROFEN 600 MG/1
1 TABLET ORAL
Status: DISCONTINUED | OUTPATIENT
Start: 2024-02-21 | End: 2024-02-26 | Stop reason: HOSPADM

## 2024-02-21 RX ORDER — SODIUM CHLORIDE 9 MG/ML
100 INJECTION, SOLUTION INTRAVENOUS CONTINUOUS
Status: DISCONTINUED | OUTPATIENT
Start: 2024-02-21 | End: 2024-02-24

## 2024-02-21 RX ORDER — OXYCODONE HYDROCHLORIDE 5 MG/1
5 TABLET ORAL EVERY 4 HOURS PRN
Qty: 40 TABLET | Refills: 0 | Status: SHIPPED | OUTPATIENT
Start: 2024-02-21

## 2024-02-21 RX ORDER — ROPIVACAINE HYDROCHLORIDE 2 MG/ML
1 INJECTION, SOLUTION EPIDURAL; INFILTRATION; PERINEURAL CONTINUOUS
Start: 2024-02-21 | End: 2024-02-26 | Stop reason: HOSPADM

## 2024-02-21 RX ORDER — PREGABALIN 75 MG/1
75 CAPSULE ORAL ONCE
Status: DISCONTINUED | OUTPATIENT
Start: 2024-02-21 | End: 2024-02-21 | Stop reason: HOSPADM

## 2024-02-21 RX ORDER — CLINDAMYCIN PHOSPHATE 900 MG/50ML
900 INJECTION, SOLUTION INTRAVENOUS EVERY 8 HOURS
Qty: 100 ML | Refills: 0 | Status: COMPLETED | OUTPATIENT
Start: 2024-02-21 | End: 2024-02-22

## 2024-02-21 RX ORDER — ROCURONIUM BROMIDE 10 MG/ML
INJECTION, SOLUTION INTRAVENOUS AS NEEDED
Status: DISCONTINUED | OUTPATIENT
Start: 2024-02-21 | End: 2024-02-21 | Stop reason: SURG

## 2024-02-21 RX ORDER — SODIUM CHLORIDE 0.9 % (FLUSH) 0.9 %
3 SYRINGE (ML) INJECTION EVERY 12 HOURS SCHEDULED
Status: DISCONTINUED | OUTPATIENT
Start: 2024-02-21 | End: 2024-02-26 | Stop reason: HOSPADM

## 2024-02-21 RX ORDER — SODIUM CHLORIDE 0.9 % (FLUSH) 0.9 %
10 SYRINGE (ML) INJECTION AS NEEDED
Status: DISCONTINUED | OUTPATIENT
Start: 2024-02-21 | End: 2024-02-21 | Stop reason: HOSPADM

## 2024-02-21 RX ORDER — SODIUM CHLORIDE 9 MG/ML
40 INJECTION, SOLUTION INTRAVENOUS AS NEEDED
Status: DISCONTINUED | OUTPATIENT
Start: 2024-02-21 | End: 2024-02-21 | Stop reason: HOSPADM

## 2024-02-21 RX ORDER — SODIUM CHLORIDE 9 MG/ML
9 INJECTION, SOLUTION INTRAVENOUS CONTINUOUS
Status: DISCONTINUED | OUTPATIENT
Start: 2024-02-21 | End: 2024-02-26 | Stop reason: HOSPADM

## 2024-02-21 RX ORDER — LIDOCAINE HYDROCHLORIDE 10 MG/ML
0.5 INJECTION, SOLUTION EPIDURAL; INFILTRATION; INTRACAUDAL; PERINEURAL ONCE AS NEEDED
Status: COMPLETED | OUTPATIENT
Start: 2024-02-21 | End: 2024-02-21

## 2024-02-21 RX ORDER — NALOXONE HCL 0.4 MG/ML
0.1 VIAL (ML) INJECTION
Status: DISCONTINUED | OUTPATIENT
Start: 2024-02-21 | End: 2024-02-26 | Stop reason: HOSPADM

## 2024-02-21 RX ORDER — PANTOPRAZOLE SODIUM 40 MG/1
40 TABLET, DELAYED RELEASE ORAL DAILY
Status: DISCONTINUED | OUTPATIENT
Start: 2024-02-22 | End: 2024-02-26 | Stop reason: HOSPADM

## 2024-02-21 RX ORDER — HYDROMORPHONE HYDROCHLORIDE 1 MG/ML
0.5 INJECTION, SOLUTION INTRAMUSCULAR; INTRAVENOUS; SUBCUTANEOUS
Status: DISCONTINUED | OUTPATIENT
Start: 2024-02-21 | End: 2024-02-26 | Stop reason: HOSPADM

## 2024-02-21 RX ORDER — ALBUTEROL SULFATE 90 UG/1
1 AEROSOL, METERED RESPIRATORY (INHALATION) EVERY 4 HOURS PRN
Status: DISCONTINUED | OUTPATIENT
Start: 2024-02-21 | End: 2024-02-26 | Stop reason: HOSPADM

## 2024-02-21 RX ORDER — ROPIVACAINE HYDROCHLORIDE 2 MG/ML
INJECTION, SOLUTION EPIDURAL; INFILTRATION; PERINEURAL CONTINUOUS
Status: DISCONTINUED | OUTPATIENT
Start: 2024-02-21 | End: 2024-02-26 | Stop reason: HOSPADM

## 2024-02-21 RX ORDER — OXYCODONE HYDROCHLORIDE 10 MG/1
10 TABLET ORAL EVERY 4 HOURS PRN
Status: DISCONTINUED | OUTPATIENT
Start: 2024-02-21 | End: 2024-02-26 | Stop reason: HOSPADM

## 2024-02-21 RX ORDER — INSULIN LISPRO 100 [IU]/ML
2-7 INJECTION, SOLUTION INTRAVENOUS; SUBCUTANEOUS
Status: DISCONTINUED | OUTPATIENT
Start: 2024-02-21 | End: 2024-02-26 | Stop reason: HOSPADM

## 2024-02-21 RX ORDER — OXYCODONE HYDROCHLORIDE 5 MG/1
5 TABLET ORAL EVERY 4 HOURS PRN
Status: DISCONTINUED | OUTPATIENT
Start: 2024-02-21 | End: 2024-02-26 | Stop reason: HOSPADM

## 2024-02-21 RX ORDER — ONDANSETRON 2 MG/ML
4 INJECTION INTRAMUSCULAR; INTRAVENOUS ONCE AS NEEDED
Status: DISCONTINUED | OUTPATIENT
Start: 2024-02-21 | End: 2024-02-21 | Stop reason: HOSPADM

## 2024-02-21 RX ORDER — FENTANYL CITRATE 50 UG/ML
INJECTION, SOLUTION INTRAMUSCULAR; INTRAVENOUS
Status: DISPENSED
Start: 2024-02-21 | End: 2024-02-22

## 2024-02-21 RX ORDER — MELOXICAM 15 MG/1
15 TABLET ORAL DAILY
Status: DISCONTINUED | OUTPATIENT
Start: 2024-02-22 | End: 2024-02-26 | Stop reason: HOSPADM

## 2024-02-21 RX ORDER — LABETALOL HYDROCHLORIDE 5 MG/ML
10 INJECTION, SOLUTION INTRAVENOUS EVERY 4 HOURS PRN
Status: DISCONTINUED | OUTPATIENT
Start: 2024-02-21 | End: 2024-02-26 | Stop reason: HOSPADM

## 2024-02-21 RX ORDER — DEXTROSE MONOHYDRATE 25 G/50ML
25 INJECTION, SOLUTION INTRAVENOUS
Status: DISCONTINUED | OUTPATIENT
Start: 2024-02-21 | End: 2024-02-26 | Stop reason: HOSPADM

## 2024-02-21 RX ORDER — DEXAMETHASONE SODIUM PHOSPHATE 4 MG/ML
INJECTION, SOLUTION INTRA-ARTICULAR; INTRALESIONAL; INTRAMUSCULAR; INTRAVENOUS; SOFT TISSUE AS NEEDED
Status: DISCONTINUED | OUTPATIENT
Start: 2024-02-21 | End: 2024-02-21 | Stop reason: SURG

## 2024-02-21 RX ORDER — FAMOTIDINE 20 MG/1
20 TABLET, FILM COATED ORAL ONCE
Status: COMPLETED | OUTPATIENT
Start: 2024-02-21 | End: 2024-02-21

## 2024-02-21 RX ORDER — ONDANSETRON 2 MG/ML
INJECTION INTRAMUSCULAR; INTRAVENOUS AS NEEDED
Status: DISCONTINUED | OUTPATIENT
Start: 2024-02-21 | End: 2024-02-21 | Stop reason: SURG

## 2024-02-21 RX ORDER — FAMOTIDINE 10 MG/ML
20 INJECTION, SOLUTION INTRAVENOUS ONCE
Status: DISCONTINUED | OUTPATIENT
Start: 2024-02-21 | End: 2024-02-21

## 2024-02-21 RX ORDER — NALOXONE HYDROCHLORIDE 4 MG/.1ML
SPRAY NASAL
Qty: 2 EACH | Refills: 0 | Status: SHIPPED | OUTPATIENT
Start: 2024-02-21

## 2024-02-21 RX ORDER — FENTANYL CITRATE 50 UG/ML
50 INJECTION, SOLUTION INTRAMUSCULAR; INTRAVENOUS
Status: DISCONTINUED | OUTPATIENT
Start: 2024-02-21 | End: 2024-02-21 | Stop reason: HOSPADM

## 2024-02-21 RX ORDER — METHOCARBAMOL 500 MG/1
1000 TABLET, FILM COATED ORAL NIGHTLY
Status: DISCONTINUED | OUTPATIENT
Start: 2024-02-21 | End: 2024-02-26 | Stop reason: HOSPADM

## 2024-02-21 RX ADMIN — SODIUM CHLORIDE 500 ML: 9 INJECTION, SOLUTION INTRAVENOUS at 23:45

## 2024-02-21 RX ADMIN — LIDOCAINE HYDROCHLORIDE 50 MG: 10 INJECTION, SOLUTION EPIDURAL; INFILTRATION; INTRACAUDAL; PERINEURAL at 10:01

## 2024-02-21 RX ADMIN — VANCOMYCIN HYDROCHLORIDE 1250 MG: 10 INJECTION, POWDER, LYOPHILIZED, FOR SOLUTION INTRAVENOUS at 09:19

## 2024-02-21 RX ADMIN — ROCURONIUM BROMIDE 10 MG: 10 INJECTION INTRAVENOUS at 10:01

## 2024-02-21 RX ADMIN — DEXTROSE MONOHYDRATE 900 MG: 50 INJECTION, SOLUTION INTRAVENOUS at 09:59

## 2024-02-21 RX ADMIN — SODIUM CHLORIDE 100 ML/HR: 9 INJECTION, SOLUTION INTRAVENOUS at 12:59

## 2024-02-21 RX ADMIN — FAMOTIDINE 20 MG: 20 TABLET, FILM COATED ORAL at 08:59

## 2024-02-21 RX ADMIN — SODIUM CHLORIDE 100 ML/HR: 9 INJECTION, SOLUTION INTRAVENOUS at 17:20

## 2024-02-21 RX ADMIN — DEXAMETHASONE SODIUM PHOSPHATE 4 MG: 4 INJECTION INTRA-ARTICULAR; INTRALESIONAL; INTRAMUSCULAR; INTRAVENOUS; SOFT TISSUE at 10:06

## 2024-02-21 RX ADMIN — FENTANYL CITRATE 50 MCG: 50 INJECTION, SOLUTION INTRAMUSCULAR; INTRAVENOUS at 12:35

## 2024-02-21 RX ADMIN — SODIUM CHLORIDE 9 ML/HR: 9 INJECTION, SOLUTION INTRAVENOUS at 08:55

## 2024-02-21 RX ADMIN — INSULIN DETEMIR 15 UNITS: 100 INJECTION, SOLUTION SUBCUTANEOUS at 21:35

## 2024-02-21 RX ADMIN — CLINDAMYCIN PHOSPHATE 900 MG: 900 INJECTION, SOLUTION INTRAVENOUS at 19:15

## 2024-02-21 RX ADMIN — Medication 120 MG: at 10:01

## 2024-02-21 RX ADMIN — TRANEXAMIC ACID 1000 MG: 10 INJECTION, SOLUTION INTRAVENOUS at 10:07

## 2024-02-21 RX ADMIN — PROPOFOL 200 MG: 10 INJECTION, EMULSION INTRAVENOUS at 10:01

## 2024-02-21 RX ADMIN — Medication 1000 MG: at 12:36

## 2024-02-21 RX ADMIN — SUGAMMADEX 200 MG: 100 INJECTION, SOLUTION INTRAVENOUS at 11:31

## 2024-02-21 RX ADMIN — ROCURONIUM BROMIDE 40 MG: 10 INJECTION INTRAVENOUS at 10:08

## 2024-02-21 RX ADMIN — FENTANYL CITRATE 50 MCG: 50 INJECTION, SOLUTION INTRAMUSCULAR; INTRAVENOUS at 12:50

## 2024-02-21 RX ADMIN — ONDANSETRON 4 MG: 2 INJECTION INTRAMUSCULAR; INTRAVENOUS at 11:36

## 2024-02-21 RX ADMIN — MELOXICAM 15 MG: 15 TABLET ORAL at 08:59

## 2024-02-21 RX ADMIN — SODIUM CHLORIDE 500 ML: 9 INJECTION, SOLUTION INTRAVENOUS at 21:25

## 2024-02-21 RX ADMIN — SODIUM CHLORIDE 500 ML: 9 INJECTION, SOLUTION INTRAVENOUS at 16:21

## 2024-02-21 RX ADMIN — ROCURONIUM BROMIDE 20 MG: 10 INJECTION INTRAVENOUS at 10:49

## 2024-02-21 RX ADMIN — BUPIVACAINE HYDROCHLORIDE 30 ML: 2.5 INJECTION, SOLUTION EPIDURAL; INFILTRATION; INTRACAUDAL; PERINEURAL at 10:04

## 2024-02-21 RX ADMIN — TRANEXAMIC ACID 1000 MG: 10 INJECTION, SOLUTION INTRAVENOUS at 11:21

## 2024-02-21 RX ADMIN — LIDOCAINE HYDROCHLORIDE 0.5 ML: 10 INJECTION, SOLUTION EPIDURAL; INFILTRATION; INTRACAUDAL; PERINEURAL at 08:55

## 2024-02-21 RX ADMIN — FENTANYL CITRATE 100 MCG: 50 INJECTION, SOLUTION INTRAMUSCULAR; INTRAVENOUS at 10:01

## 2024-02-21 RX ADMIN — SODIUM CHLORIDE 500 ML: 9 INJECTION, SOLUTION INTRAVENOUS at 14:38

## 2024-02-21 NOTE — OP NOTE
OPERATIVE REPORT     DATE OF PROCEDURE: 2/21/2024    SURGEON: Olaf Buck M.D.     ASSISTANT(S): Circulator: Elyssa Benoit RN; Nusrat Orourke RN  Physician Assistant: Jackelyn Brown PA-C  Scrub Person: Dario Bell Vanetta  Vendor Representative: Luba Garcia Stuart  Nursing Assistant: Neena Palafox-PA was utilized during the case to facilitate positioning the patient, exposure, retraction, placement of final components and definitive closure.    PREOPERATIVE DIAGNOSIS: Advanced degenerative joint disease of the left knee secondary to osteoarthritis    POSTOPERATIVE DIAGNOSIS: same     PROCEDURE: Left total Knee Arthroplasty CPT 33158, Use of computer-assisted surgical navigation system CPT 86458     SURGICAL DETAILS:     APPROACH: Medial parapatellar     ANESTHESIA: General plus regional plus local periarticular block    PREOPERATIVE ANTIBIOTICS: Vancomycin 1250 mg IV, clindamycin 900 mg IV    TRANEXAMIC ACID: IV    TOURNIQUET TIME: 55 min @300 mmHg     ESTIMATED BLOOD LOSS: [] cc     SPECIMENS: None    IMPLANTS:   /Brand: Kim triathlon  Tibial component size: 3 pressfit tritanium baseplate   Femoral component size: 2 pressfit cruciate retaining   Tibial polyethylene insert: 9 mm cruciate stabilizing   Patellar component: 29 mm asymmetric tritanium  Cement: None    DRAINS: None    LOCAL INJECTION: 1 cc Toradol 30mg/ml, 4 cc duramorph 2mg/ml, 20 cc 0.5% ropivicaine, 20 cc 0.5% lidocaine with 1:200,000 epinephrine, 15 cc preservative free normal saline     MODIFIER(S): None    COMPLICATIONS: None apparent    INDICATIONS FOR PROCEDURE: The patient has a long history of progressive knee pain, arthritis, and degeneration resulting in deformity in the left knee from predominantly lateral wear and bone loss. Non-operative treatment and conservative therapeutic measures have been attempted, but have not improved or controlled the symptoms and pain that occurs  during normal daily activities. Knee motion has also become limited and is restricting the patient. Total knee arthroplasty was recommended. The risks, benefits, alternatives, and potential complications of the arthroplasty surgery were discussed with the patient in detail to include but not be limited to infection, bleeding, anesthesia risks, damage to neurovascular structures, osteolysis, aseptic loosening, instability, anterior knee pain, continued pain, iatrogenic fracture, dislocation, need for future surgery including the potential for amputation, blood clots, myocardial infarction, stroke, and death. Specific details of the surgical procedure, hospitalization, recovery, rehabilitation, and long-term precautions were also presented. Pre-operative teaching was provided. Implant/prosthesis selection was outlined, and the many options available were explained; the final choice will be made at the time of the procedure to match the anatomy and condition of the bone, ligaments, tendons, and muscles. The patient completed preoperative medical optimization and risk assessment, joint arthroplasty education, and MRSA decolonization using a universal decolonization protocol. Perioperative blood management and the potential for blood transfusion were discussed with risks and options clearly outlined.     INTRAOPERATIVE FINDINGS: Advanced tricompartmental osteoarthritis with genu valgum alignment    PROCEDURE: The patient was identified in the preoperative holding area. The operative site was confirmed and marked. A sequential compression device was placed on the nonoperative leg. The risks, benefits, and alternatives to surgery were again confirmed with the patient and the patient wished to proceed. The patient was brought to the operating room and placed on the operating room table in the supine position. All bony prominences were padded. A huddle was performed with the patient and all vital surgical team members to  confirm the correct operative site, procedure, anesthesia type, and operative plan with the patient. After anesthesia was performed, a tourniquet was applied to the upper thigh of the operative leg. A full knee exam was performed once anesthesia was in full effect. Intravenous antibiotic prophylaxis was given and confirmed with the anesthesia team.     The operative leg was prepped and draped in the usual sterile fashion. A surgical time out was performed immediately preceding the incision with all personnel in the operating room to confirm patient identity, the correct operative site and extremity, correct radiographic studies, availability of appropriate surgical equipment and agreement on the planned procedure. The operative knee was elevated and exsanguinated using an esmarch and the tourniquet was inflated. The knee was exposed using a limited anterior-midline skin incision. Dissection was carried down through skin and subcutaneous tissue to the extensor mechanism with a scalpel. A medial parapatellar arthrotomy was made to enter the knee space sharply. A large amount of normal appearing joint fluid was encountered and suctioned. The synovium was thickened, hypertrophic, and inflamed. A partial synovectomy was performed for exposure, and the medial and lateral gutters were cleared of scar and synovial reflections. The superficial medial collateral ligament was carefully elevated off osteophytes .  The patellar synovial reflections were released and the patella exposed to reveal complete wear through the articular surface. The trochlea demonstrated similar severe wear. The patella was then subluxed laterally. The knee was then flexed up to 90 degrees.     Assessment of the knee joint revealed severe end-stage articular damage with no remaining lateral weight bearing cartilage. The lateral compartment was severely eburnated with bone loss on the lateral tibia and lateral femoral condyle, resulting in the valgus  deformity.     With the exposure complete, femoral pins for navigation were drilled and placed intra-incisional in the region of the medial distal femoral metaphysis just proximal to the medial epicondyle.  Tibial pins for navigation were then placed extra-incisional 4 fingerbreadths below the tibial tubercle taking care to engage the far cortex of the tibia but not perforate the cortex.  The navigation arrays were then placed onto the pins, adjusted, and tightened definitively.  The femoral and tibial checkpoints were then placed.    The knee was then taken through range of motion to find the hip center.  The medial and lateral malleolus were then marked to find the ankle center. Next, using a rongeur and osteotome, marginal osteophytes were then removed from the tibia and the femur and the ACL resected.  Baseline measurements of the knee were then taken and the knee was balanced with adjustments made to varus and valgus on the femur and tibia as well as femoral rotation.  Adjustments were as follows:    Femur: Neutral, 2.5 degrees external rotation relative to the transepicondylar axis.  1.5 mm posterior translation  Tibia: 0.5 degrees varus, 3 degrees posterior slope, 2 mm proximal translation    Satisfied with the balance of the knee, attention was turned to bony resection.  The tibia was cut first and the tibial bone removed.  A tensioner was then placed on the resected surface to tension the knee in both flexion and extension.  The adjustments made precut were found to remain grossly unchanged with overall good alignment and balance of the knee in flexion and extension.  Next, femoral cuts were made starting with the posterior femoral cuts followed by the anterior femoral cut, followed by the anterior chamfer.  The sawblade was then changed and the distal femoral cut and posterior chamfers were completed.    A lamina  was placed with the knee in 90 degrees of flexion and a large curved osteotome,  rongeur, and curettes were utilized to clear posterior osteophytes. The medial/lateral meniscal remnants were then excised along with any loose bodies.     A femoral trial implant was placed; excellent fit was confirmed. The medial-lateral and anterior-posterior dimensions were checked; anatomic fit and coverage were achieved.The proximal tibia baseplate trial was placed with its mid-point at the junction of medial one-third and lateral two-thirds of the tibial tubercle and pinned to this fixed position. A trial reduction was then performed. Trial reduction demonstrated the knee achieved full extension with excellent stability and range of motion, and no tendency toward instability with varus-valgus stress at full extension, mid-flexion, or 90 degrees of flexion. The PCL was also found to be appropriately tensioned with normal posterior tibial excursion.  The tibia and femoral pins and arrays were then removed along with the femoral and tibial checkpoints.    Next, attention was turned to the patella. It was measured and the posterior 9-10 mm was resected leaving a healthy remnant with greater than 11mm thickness. The patella was sized with the asymmetric guide, and drill holes were made. A trial button was placed and tracking of the patella and the entire knee trial was tested. The patella tracking was excellent throughout range of motion with no instability. Punches and drills were then placed through the trials to accommodate the final implants. All trials were removed.     The wound was copiously lavaged with a pulse irrigation/suction system. The posterior recess of the knee and areas of known bleeding were treated with the electrocautery to reduce post-operative bleeding. A pain cocktail was injected into the ginna-articular tissues. The cut bone surfaces were then irrigated again, suctioned, and dried. The final implants were impacted into place, tibia followed by tibial polyethylene followed by femur followed  by patella. The tourniquet was released and no excessive bleeding was encountered. Synovial bleeding was further treated with the electrocautery until adequate hemostasis was obtained.     The wound was again irrigated with dilute betadine solution followed by saline. The extensor mechanism and capsule was then anatomically closed with interrupted #1 Vicryl suture and a running #2 Stratafix stitch. Knee stability and range of motion with the capsule closed was excellent, and range of motion was 0 to 135 degrees without excessive stress on the repair. Instrument and sponge count was completed and confirmed correct. Deep and superficial subcutaneous tissue was closed with interrupted 2-0 Vicryl suture. A running 3-0 Monocryl subcuticular stitch was used to re-approximate the skin edges followed by skin glue adhesive to seal the wound. A silver impregnated dressing was then placed over the knee incision and a Covaderm over the tibial pin incisions, followed by a sequential compression device to the operative limb. The patient was sufficiently recovered from anesthesia, transferred to a hospital bed and taken to the PACU in stable condition.     One gram (1000 mg) of intravenous tranexamic acid was administered prior to incision. A second one gram (1000 mg) intravenous dose was given prior to wound closure.    No apparent complications occurred during the procedure. Instrument, sponge and needle counts were correct x 2.     The patient underwent risk stratification preoperatively and aspirin was chosen for DVT prophylaxis. Delay in starting chemical prophylaxis for 23 hours from surgical incision was over concerns for hematoma formation and wound related issues.     POST OPERATIVE PLAN:   Weight bearing as tolerated with knee range of motion as tolerated   Pain control with PO/IV meds   Adductor canal catheter placement by Anesthesia Pain Management Team in PACU.   23 hours perioperative antibiotic prophylaxis   PT/OT  for mobilization and medical equipment needs   Keep silver dressing in place for 7 days post op. Change dressing only if saturated.   SCDs to bilateral lower extremities   Social work for discharge planning needs   Follow up in 3 weeks for post operative wound check with 3 views of operative knee.

## 2024-02-21 NOTE — ANESTHESIA PROCEDURE NOTES
Peripheral Block      Patient reassessed immediately prior to procedure    Reason for block: at surgeon's request and post-op pain management  Performed by  CRNA/CAA: Home Sotelo CRNASRNA: Will Christensen SRNA  Preanesthetic Checklist  Completed: patient identified, IV checked, site marked, risks and benefits discussed, surgical consent, monitors and equipment checked, pre-op evaluation and timeout performed  Prep:  Pt Position: supine  Sterile barriers:cap, gloves, mask, sterile barriers and washed/disinfected hands  Prep: ChloraPrep  Patient monitoring: blood pressure monitoring, continuous pulse oximetry and EKG  Procedure    Sedation: no  Performed under: general  Guidance:ultrasound guided    ULTRASOUND INTERPRETATION.  Using ultrasound guidance a 20 G gauge needle was placed in close proximity to the nerve, at which point, under ultrasound guidance anesthetic was injected in the area of the nerve and spread of the anesthesia was seen on ultrasound in close proximity thereto.  There were no abnormalities seen on ultrasound; a digital image was taken; and the patient tolerated the procedure with no complications. Images:still images obtained, printed/placed on chart    Laterality:left  Block Type:adductor canal block  Injection Technique:single-shot  Needle Type:Tuohy and echogenic  Needle Gauge:18 G  Resistance on Injection: none  Catheter Size:20 G (20g)    Medications Used: bupivacaine PF (MARCAINE) 0.25 % injection - Injection   30 mL - 2/21/2024 10:04:00 AM      Post Assessment  Injection Assessment: negative aspiration for heme, incremental injection and no paresthesia on injection  Patient Tolerance:comfortable throughout block  Complications:no  Additional Notes  SINGLE shot   A high-frequency linear transducer, with sterile cover, was placed on the anterior mid-thigh (between the anterior superior iliac spine and patella). The transducer was then moved medially to identify the Sartorius muscle  "(Nicolas), Vastus Medialis muscle (VMM), Superficial Femoral Artery (SFA) and Vein. The transducer was then moved cephalad or caudad to position the SFA in the middle of the Nicolas. The insertion site was prepped and draped in sterile fashion. Skin and cutaneous tissue was infiltrated with 2-5 ml of 1% Lidocaine. Using ultrasound-guidance, a 20-gauge B-Gomez 4\" Ultraplex 360 non-stimulating echogenic needle was advanced in plane from lateral to medial. Preservative-free normal saline was utilized for hydro-dissection of tissue, advancement of needle, and to confirm needle placement below the fascial plane of the Nicolas where the Nerve to the VMM is located. Local anesthetic (LA) 5 ml deposited here. The needle continues its path lateral to the SFA at the level of the Saphenous Nerve. The remainder of the LA was deposited at the 10-11 o'clock position of the SFA. This injection created a space between the Nicolas and the SFA. Aspiration every 5 ml to prevent intravascular injection. Injection was completed with negative aspiration of blood and negative intravascular injection. Injection pressures were normal with minimal resistance.             "

## 2024-02-21 NOTE — BRIEF OP NOTE
TOTAL KNEE ARTHROPLASTY WITH PETER ROBOT  Progress Note    Jeevan Cardoso  2/21/2024    Pre-op Diagnosis:   Primary osteoarthritis of left knee [M17.12]       Post-Op Diagnosis Codes:     * Primary osteoarthritis of left knee [M17.12]    Procedure/CPT® Codes:  AL ARTHRP KNE CONDYLE&PLATU MEDIAL&LAT COMPARTMENTS [74607]  AL CPTR-ASST SURGICAL NAVIGATION IMAGE-LESS [03104]      Procedure(s):  TOTAL KNEE ARTHROPLASTY WITH PETER ROBOT - LEFT    Surgical Approach: Knee Medial Parapatellar            Surgeon(s):  Olaf Buck MD    Anesthesia: Spinal    Staff:   Circulator: Elyssa Benoit, BISHOP; Nusrat Orourke RN  Physician Assistant: Jackelyn Brown PA-C  Scrub Person: Elizabeth Bell; Eli Sepulveda  Vendor Representative: Luba Garcia Stuart  Nursing Assistant: Neena Palafox         Estimated Blood Loss:  50 mL    Urine Voided: * No values recorded between 2/21/2024  9:57 AM and 2/21/2024 11:22 AM *    Specimens:                None          Drains: * No LDAs found *    Findings: Advanced tricompartmental osteoarthritis with genu valgum alignment        Complications: None apparent          Olaf Buck MD     Date: 2/21/2024  Time: 11:45 EST

## 2024-02-21 NOTE — ANESTHESIA PREPROCEDURE EVALUATION
Anesthesia Evaluation     Patient summary reviewed and Nursing notes reviewed                Airway   Mallampati: II  TM distance: >3 FB  Neck ROM: full  No difficulty expected  Dental - normal exam     Pulmonary - normal exam   (+) ,sleep apnea  Cardiovascular - normal exam    (+) hypertension      Neuro/Psych- negative ROS  GI/Hepatic/Renal/Endo    (+) obesity, GERD, hepatitis (DARBY), liver disease cirrhosis, renal disease- CRI, diabetes mellitus    ROS Comment: Semaglutide tx; last dose 9 days ago    Musculoskeletal (-) negative ROS    Abdominal  - normal exam    Bowel sounds: normal.   Substance History - negative use     OB/GYN negative ob/gyn ROS         Other   blood dyscrasia (ITP),     ROS/Med Hx Other: INR 1.29  PLT 95K                Anesthesia Plan    ASA 3     general with block   Rapid sequence  Reason for not using neuraxial anesthesia or peripheral nerve block: Patient Preference  (Peripheral nerve block + cath for post op pain relief)  intravenous induction     Anesthetic plan, risks, benefits, and alternatives have been provided, discussed and informed consent has been obtained with: patient.    Plan discussed with CRNA.      CODE STATUS:

## 2024-02-21 NOTE — LETTER
EMS Transport Request  For use at [facility], [place], [place], [place], and [place] only   Patient Name: Jeevan Cardoso : 1960   Weight:81.6 kg (180 lb) Pick-up Location: [facility] BLS/ALS: BLS/ALS: BLS   Insurance: [employer] MEDICARE REPLACEMENT Auth End Date:    Pre-Cert #: D/C Summary complete:    Destination: Other [facility]   Contact Precautions: None   Equipment (O2, Fluids, etc.): None   Arrive By Date/Time:   Today by 1530pm Stretcher/WC: Stretcher   CM Requesting: Pete Fang RN Ext: [phone]   Notes/Medical Necessity: hypotensive w/activity      ______________________________________________________________________    *Only 2 patient bags OR 1 carry-on size bag are permitted.  Wheelchairs and walkers CANNOT transported with the patient. Acknowledge: Yes

## 2024-02-21 NOTE — ANESTHESIA PROCEDURE NOTES
LEFT Adductor canal cath      Patient reassessed immediately prior to procedure    Reason for block: at surgeon's request and post-op pain management  Performed by  CRNA/CAA: Home Sotelo CRNA  Assisted by: Nusrat Ruby RNSRNA: Will Christensen SRNA  Preanesthetic Checklist  Completed: patient identified, IV checked, site marked, risks and benefits discussed, surgical consent, monitors and equipment checked, pre-op evaluation and timeout performed  Prep:  Pt Position: supine  Sterile barriers:cap, gloves, mask, sterile barriers and washed/disinfected hands  Prep: ChloraPrep  Patient monitoring: blood pressure monitoring, continuous pulse oximetry and EKG  Procedure    Sedation: no  Performed under: local infiltration  Guidance:ultrasound guided    ULTRASOUND INTERPRETATION.  Using ultrasound guidance a 20 G gauge needle was placed in close proximity to the nerve, at which point, under ultrasound guidance anesthetic was injected in the area of the nerve and spread of the anesthesia was seen on ultrasound in close proximity thereto.  There were no abnormalities seen on ultrasound; a digital image was taken; and the patient tolerated the procedure with no complications. Images:still images obtained, printed/placed on chart    Laterality:left  Block Type:adductor canal block  Injection Technique:catheter  Needle Type:Tuohy and echogenic  Needle Gauge:18 G  Resistance on Injection: none  Catheter Size:20 G (20g)  Cath Depth at skin: 10 cm          Medications  Preservative Free Saline:10ml    Post Assessment  Injection Assessment: negative aspiration for heme, incremental injection and no paresthesia on injection  Patient Tolerance:comfortable throughout block  Complications:no  Additional Notes  CATHETER   A high-frequency linear transducer, with sterile cover, was placed on the anterior mid-thigh (between the anterior superior iliac spine and patella). The transducer was then moved medially to identify the  "Sartorius muscle (Nicolas), Vastus Medialis muscle (VMM), Superficial Femoral Artery (SFA) and Vein. The transducer was then moved cephalad or caudad to position the SFA in the middle of the Nicolas. The insertion site was prepped and draped in sterile fashion. Skin and cutaneous tissue was infiltrated with 2-5 ml of 1% Lidocaine. Using ultrasound-guidance, an 18-gauge Transphormiplex Ultra 360 Touhy needle was advanced in plane from lateral to medial. Preservative-free normal saline was utilized for hydro-dissection of tissue, advancement of Touhy, and to confirm needle placement below the fascial plane of the Nicolas where the Nerve to the VMM is located. Local anesthetic (LA) 5 ml deposited here. The Touhy needle continues its path lateral to the SFA at the level of the Saphenous Nerve. The remainder of the LA was deposited at the 10-11 o'clock position of the SFA. This injection created a space between the Nicolas and the SFA. Aspiration every 5 ml to prevent intravascular injection. Injection was completed with negative aspiration of blood and negative intravascular injection. Injection pressures were normal with minimal resistance. A 20-gauge Transphormiplex Echo catheter was placed through the needle and advance out the tip of the Touhy 3-5 cm anterior to the SFA. The Touhy needle was then removed, and final catheter position verified at the 12 o'clock position to the SFA. The catheter was secured in the usual fashion with skin glue, benzoin, steri-strips, CHG tegaderm and label noting \"Nerve Block Catheter\". Jerk tape applied at yellow connector and catheter connection.           "
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
## 2024-02-21 NOTE — ANESTHESIA PROCEDURE NOTES
Airway  Urgency: elective    Date/Time: 2/21/2024 10:02 AM  Airway not difficult    General Information and Staff    Patient location during procedure: OR  CRNA/CAA: Navdeep Chen CRNA    Indications and Patient Condition  Indications for airway management: airway protection    Preoxygenated: yes  MILS not maintained throughout  Mask difficulty assessment: 0 - not attempted    Final Airway Details  Final airway type: endotracheal airway      Successful airway: ETT  Cuffed: yes   Successful intubation technique: direct laryngoscopy and RSI  Facilitating devices/methods: cricoid pressure and intubating stylet  Endotracheal tube insertion site: oral  Blade: Anand  Blade size: 2  ETT size (mm): 7.0  Cormack-Lehane Classification: grade I - full view of glottis  Placement verified by: chest auscultation and capnometry   Cuff volume (mL): 6  Measured from: lips  ETT/EBT  to lips (cm): 20  Number of attempts at approach: 1  Assessment: lips, teeth, and gum same as pre-op and atraumatic intubation    Additional Comments  Negative epigastric sounds, Breath sound equal bilaterally with symmetric chest rise and fall. RSI with cricoid pressure until tube confirmation. VC clear upon DL

## 2024-02-21 NOTE — ANESTHESIA POSTPROCEDURE EVALUATION
Patient: Jeevan Cardoso    Procedure Summary       Date: 02/21/24 Room / Location:  [facility] /  [facility]    Anesthesia Start: 0957 Anesthesia Stop: 1225    Procedure: TOTAL KNEE ARTHROPLASTY WITH PETER ROBOT - LEFT (Left: Knee) Diagnosis:       Primary osteoarthritis of left knee      (Primary osteoarthritis of left knee [M17.12])    Surgeons: Olaf Buck MD Provider: Navdeep Tyson MD    Anesthesia Type: general with block ASA Status: 3            Anesthesia Type: general with block    Vitals  Vitals Value Taken Time   /57 02/21/24 1220   Temp 98.9 °F (37.2 °C) 02/21/24 1220   Pulse 88 02/21/24 1225   Resp 16 02/21/24 1220   SpO2 100 % 02/21/24 1225   Vitals shown include unfiled device data.        Post Anesthesia Care and Evaluation    Patient location during evaluation: PACU  Patient participation: complete - patient participated  Level of consciousness: awake and alert  Pain management: adequate    Airway patency: patent  Anesthetic complications: No anesthetic complications  PONV Status: none  Cardiovascular status: hemodynamically stable and acceptable  Respiratory status: nonlabored ventilation, acceptable and nasal cannula  Hydration status: acceptable

## 2024-02-21 NOTE — DISCHARGE INSTRUCTIONS
"DISCHARGE INSTRUCTIONS   Dr. Buck     Total Knee Replacement/ Partial (Uni) Knee Replacement     Wound Care   1) Keep wound / incision area clean and dry.   2) Dressing to remain in place until post-operative day 7. Upon dressing removal, assess for wound drainage. If no drainage is present, keep wound / incision area open to air as much as possible. If drainage is present, place sterile dressing to cover wound and assess daily. If drainage continues to occur after post-operative day 14, call the office for an urgent appointment. (You should be seen in the clinic within 1-2 days of calling). DO NOT REMOVE SUTURES (IF PRESENT) UNDER ANY CIRCUMSTANCES PRIOR TO FOLLOW UP APPOINTMENT.  3) No baths or swimming until otherwise instructed. The wound must remain dry for 10 days after surgery. After 10 days, you may begin to shower only if no drainage is present. No submerging the wound under standing water until cleared by your physician (no baths, hot tubs, swimming pools, etc). Sponge baths are the best way to perform personal hygiene while at the same time protecting the wound from moisture.   4) Prior to showering, the wound must remain dry for 72 consecutive hours (no drainage whatsoever) prior to showering. If the wound drains or spots, the clock \"resets\" - make sure the wound has been drainage-free for 72 consecutive hours.   5) Once you are allowed to get the wound wet, please use gentle soap to wash the wound area. DO NOT aggressively scrub the wound with a washcloth or bath sponge. Please visually inspect your wound(s) at least once daily. If the wound(s) are in a difficult to see location, please use a mirror or have someone else assist with visual inspection.   6) No scrubbing the wound. You may \"pad dry\" the wound, but do not rub, as this may open up the wound and pre-dispose to wound infection.   7) Do not apply lotions or creams to incision site, unless instructed otherwise.   8) Observe for redness, " "swelling, or drainage. Please call the clinic immediately if you have fevers, chills with warmth/redness surrounding wound site or if you notice pus drainage from the wound site     Activity   No heavy lifting objects greater than 10 pounds.   No driving while on narcotic pain medication.   No submerging wound under standing water (pool, bath tub, etc.) until otherwise instructed.   You may be back to weightbearing as tolerated on your operative (left lower) extremity   Use crutches or a walker for ambulation.   Wean as appropriate per physical therapist's discretion.   Do not sleep with a pillow behind your knee. You may sleep with a pillow behind your Achilles or foot. This will prevent your knee from getting stiff in the flexed (\"bent\") position and will encourage full extension (leg straightening).   Be vigilant in terms of working on full knee extension and flexion. Your goal should be 0 to 90 degrees by 2 weeks post-op - MINIMUM!   Knee range of motion as tolerated.    Blood Clot Prophylaxis   (Aspirin vs. Lovenox vs. Eliquis administration is determined by your surgeon and tailored to your specific risk profile. You will be discharged with one of these medications.) You will need to complete a total 4 week course of enteric coated aspirin 325 mg (or 81mg) twice daily or Eliquis 2.5mg twice daily, in order to minimize your risk of blood clots following surgery. You will be supplied with a prescription to obtain this. Alternatively, you will need to compete a total 2 week course of Lovenox after surgery (followed by a 2 week course of aspirin twice daily), in order to minimize the risk of blood clots following surgery. Lovenox requires a single shot in the abdomen, to be taken once daily. You will be supplied with the prescription to obtain this. Prior to your discharge from the hospital, the nursing staff will instruct you on self-administration of the Lovenox, if you will be returning directly home from the " "hospital.     Discharge Pain Medications   You will be given a prescription for pain medication. You should start taking this the same day after your surgery. Wean off as tolerated. Do not wait to take the pain medication until the pain is severe, as it will be difficult to \"catch up\" once this occurs. The pain medication usually reaches its full effect ~1 hour after ingesting. If you have been sent home on Colace, this medication should be taken until you are off all narcotic (i.e. Oxycodone, etc) pain medications, in order to prevent constipation. If you have been sent home with a combination of oxycodone and Tylenol, please take Tylenol as scheduled.  You must be careful not to exceed 4,000mg (4 grams) of Tylenol. The oxycodone is to be taken as needed for \"breakthrough\" pain.  Some common side effects of the narcotic pain medications include nausea and itching. Benadryl is a great over the counter medication that helps calm your stomach, decreases your anxiety levels, and minimizes the itching. You can easily purchase this at your local pharmacy as an over-the-counter medication. Please abide by the instructions as printed on the bottle. If your nausea persists, make sure to take small amounts of crackers or other lighter foods.     Follow-Up   Follow-up with Dr. Buck's office in 3 weeks from the surgery date for a post-operative evaluation. Have the following xrays done upon arrival to the follow-up appointment: 3 views of operative knee. Please call Dr. Buck's office at [phone] for orthopaedic appointments or questions.  Dr. Buck's Office [phone] - Patient Information    What is a pain pump?  The InfuBLOCK pump delivers post-operative, non-narcotic, numbing medication to the nerve near the surgical site for pain relief.     Where can I find information about my pain pump?           For more information about your pain pump, scan the QR code.  For additional patient resources, " visit [email or web address].                                                                                               While your physician is your primary source for information about your treatment there may be times during your treatment that you need assistance with your infusion pump.     If you need assistance take the following steps:    The Related Content Database (RCDb) Nursing Hotline is Here for You 24/7.  Please call 1-806.133.1839 for the following concerns or complications:    Answers to questions about your infusion pump                 Tubing disconnect  Assistance with pump alarms                                                      Dislodged catheter  Excessive leakage noted from pump                                         Inadequate pain control    2.   [facility] Anesthesia Acute Pain Service: [phone] is available 24/7 for any further needs or concerns about medication or pain control.     -------------------------------------------------------------------------    Nerve Catheter Removal Instructions  When your device is empty:    Remove your catheter by pulling the dressing off slowly (like you would remove a regular bandage). The catheter should pull right out of the skin.  Check that the BLUE tip is intact.                                                                                     If the catheter is stuck, reposition your   extremity and pull slowly until removed.  *If catheter is HURTING and WON'T come out, stop and call [phone] for further assistance.    Remove medication bag from the black carrying case.  Cut the tubing on right and left side of pump, and discard the medication bag and tubing into garbage.  Place the pump and black carrying case into the plastic bag and then place this into the return box.  Seal box with blue stickers and return to [employer] postal service. THIS IS PRE-PAID  POSTAGE.        -------------------------------------------------------------------------    SMI COLD THERAPY - PATIENT INSTRUCTION SHEET    Cold Compression Therapy for your comfort and rehabilitation  Your caregivers want you to be productive in your rehab and comfortable during your stay. In keeping with those goals, you will be receiving an SMI Cold Therapy Wrap to help ease post-operative pain and swelling that might keep you from getting back on track! Your SMI Cold Therapy Wrap is effective and simple-to-use, and you will be encouraged to apply it throughout your hospital stay and at home through the duration of your recovery.    When you are ready to go home  Be sure to take your SMI Cold Therapy Wrap and both sets of Gel Bags with you for continued comfort and use throughout your rehabilitation. If you don't already have them, ask your nurse or aide to retrieve your SMI Gel Bags from the patient freezer.    Home use precautions  Always follow your medical professional's application instructions upon discharge. Your SMI Cold Therapy Wrap and Gel Bags are designed to last for months following your surgery. Never heat the Gel Bags unless specified by your healthcare provider. Supervision is advised when using this product on children or geriatric patients. To avoid danger of suffocation, please keep the outer plastic packaging away from children & pets.    Cold Therapy Instructions  Place Gel Bags in a freezer set ¾ of the way to max temperature for at least (4) hours. For best results, lay the Gel Bags flat and [id number] in the freezer. Once frozen, slide Gel Bags into the gel pouch and secure your wrap to the affected area with the straps.  Gel wraps that have been stored in a freezer for an extended period of time may require a (10) minute period of softening up in a room temperature environment before application.  The gel pouch acts as a protective barrier. NEVER place frozen bags directly onto skin,  as this may cause frostbite injury.  The [facility] Cold Therapy Wrap is designed to be able to be worm while ambulating. The compression straps can be secured well enough so that the Wrap won't fall off while moving.  Wrap Application Videos can be viewed at [email or web address].  An additional protective barrier such as clothing, a washcloth, hand-towel or pillowcase may be used during prolonged treatment applications.  The Gel-Pouch and Wrap are both Latex-Free and the Gel Bag ingredients are non toxic.    [facility] Wrap care instructions  The [facility] Cold Therapy Wrap may be hand washed and hung to dry when needed.    [facility] re-order information  Additional [facility] body specific wraps and/or Gel Bags can be re-ordered from [email or web address] or call [email or web address] ([phone])
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
## 2024-02-21 NOTE — CASE MANAGEMENT/SOCIAL WORK
Discharge Planning Assessment  [facility]     Patient Name: Jeevan Cardoso  MRN: [id number]  Today's Date: 2/21/2024    Admit Date: 2/21/2024    Plan: Home with spouse's assistance and OP PT with [facility] @ [facility]   Discharge Needs Assessment       Row Name 02/21/24 1458       Living Environment    People in Home spouse    Name(s) of People in Home Conrad Cardoso Spouse [phone]    Current Living Arrangements home    Potentially Unsafe Housing Conditions none    In the past 12 months has the electric, gas, oil, or water company threatened to shut off services in your home? No    Primary Care Provided by self    Provides Primary Care For no one    Family Caregiver if Needed spouse    Family Caregiver Names Conrad Cardoso Spouse [phone]    Quality of Family Relationships helpful;involved;supportive    Able to Return to Prior Arrangements yes       Resource/Environmental Concerns    Resource/Environmental Concerns none    Transportation Concerns none       Transportation Needs    In the past 12 months, has lack of transportation kept you from medical appointments or from getting medications? no    In the past 12 months, has lack of transportation kept you from meetings, work, or from getting things needed for daily living? No       Food Insecurity    Within the past 12 months, you worried that your food would run out before you got the money to buy more. Never true    Within the past 12 months, the food you bought just didn't last and you didn't have money to get more. Never true       Transition Planning    Patient/Family Anticipates Transition to home with family    Patient/Family Anticipated Services at Transition     Transportation Anticipated family or friend will provide       Discharge Needs Assessment    Readmission Within the Last 30 Days no previous admission in last 30 days    Equipment Currently Used at Home cane, straight;glucometer;walker, rolling    Concerns to be Addressed denies  needs/concerns at this time    Anticipated Changes Related to Illness none    Equipment Needed After Discharge none                   Discharge Plan       Row Name 02/21/24 1504       Plan    Plan Home with spouse's assistance and OP PT with [facility] @ [facility]    Patient/Family in Agreement with Plan yes    Plan Comments CM spoke with patient and spouse at bedside regarding DC planning. Patient resides in [facility] with her spouse. Patient is independent with ADL's, has a cane and a rolling walker in her home. Patient denies any current home health or outpatient services. Patient has medical insurance, prescription coverage and is able to afford/obtain medications without difficulty. Patient has no advanced directives. Dr Buck's ofc set up OP PT with [facility] on [facility] for Friday, 2/23 @ 11am. Spouse to provide transportation to home. CM following.    Final Discharge Disposition Code 01 - home or self-care                  Continued Care and Services - Admitted Since 2/21/2024    Coordination has not been started for this encounter.       Selected Continued Care - Episodes Includes continued care and service providers with selected services from the active episodes listed below      Chronic Migraine Episode start date: 2/21/2023   There are no active outsourced providers for this episode.                 Expected Discharge Date and Time       Expected Discharge Date Expected Discharge Time    Feb 21, 2024            Demographic Summary       Row Name 02/21/24 1450       General Information    Arrived From PACU/recovery room    Reason for Consult discharge planning    Preferred Language English       Contact Information    Contact Information Comments Conrad Cardoso Spouse [phone]                   Functional Status       Row Name 02/21/24 1456       Functional Status    Usual Activity Tolerance good    Current Activity Tolerance --  See PT notes       Physical Activity    On average, how many days  per week do you engage in moderate to strenuous exercise (like a brisk walk)? 0 days    On average, how many minutes do you engage in exercise at this level? 0 min    Number of minutes of exercise per week 0       Assessment of Health Literacy    How often do you have someone help you read hospital materials? Never    How often do you have problems learning about your medical condition because of difficulty understanding written information? Never    How often do you have a problem understanding what is told to you about your medical condition? Never    How confident are you filling out medical forms by yourself? Quite a bit    Health Literacy Good       Functional Status, IADL    Medications independent    Meal Preparation independent    Housekeeping independent    Laundry independent    Shopping independent       Mental Status    General Appearance WDL WDL       Mental Status Summary    Recent Changes in Mental Status/Cognitive Functioning no changes       Employment/    Employment Status disabled                   Psychosocial    No documentation.                  Abuse/Neglect    No documentation.                  Legal    No documentation.                  Substance Abuse    No documentation.                  Patient Forms    No documentation.                     Merlyn Lomas RN

## 2024-02-22 LAB
ANION GAP SERPL CALCULATED.3IONS-SCNC: 9 MMOL/L (ref 5–15)
BUN SERPL-MCNC: 16 MG/DL (ref 8–23)
BUN/CREAT SERPL: 15.5 (ref 7–25)
CALCIUM SPEC-SCNC: 7.3 MG/DL (ref 8.6–10.5)
CHLORIDE SERPL-SCNC: 108 MMOL/L (ref 98–107)
CO2 SERPL-SCNC: 22 MMOL/L (ref 22–29)
CREAT SERPL-MCNC: 1.03 MG/DL (ref 0.57–1)
DEPRECATED RDW RBC AUTO: 54.4 FL (ref 37–54)
EGFRCR SERPLBLD CKD-EPI 2021: 62 ML/MIN/1.73
ERYTHROCYTE [DISTWIDTH] IN BLOOD BY AUTOMATED COUNT: 14.7 % (ref 12.3–15.4)
GLUCOSE BLDC GLUCOMTR-MCNC: 131 MG/DL (ref 70–130)
GLUCOSE BLDC GLUCOMTR-MCNC: 155 MG/DL (ref 70–130)
GLUCOSE BLDC GLUCOMTR-MCNC: 168 MG/DL (ref 70–130)
GLUCOSE BLDC GLUCOMTR-MCNC: 171 MG/DL (ref 70–130)
GLUCOSE SERPL-MCNC: 169 MG/DL (ref 65–99)
HCT VFR BLD AUTO: 30.1 % (ref 34–46.6)
HGB BLD-MCNC: 9.8 G/DL (ref 12–15.9)
MCH RBC QN AUTO: 32.9 PG (ref 26.6–33)
MCHC RBC AUTO-ENTMCNC: 32.6 G/DL (ref 31.5–35.7)
MCV RBC AUTO: 101 FL (ref 79–97)
PLATELET # BLD AUTO: 72 10*3/MM3 (ref 140–450)
PMV BLD AUTO: 10 FL (ref 6–12)
POTASSIUM SERPL-SCNC: 4 MMOL/L (ref 3.5–5.2)
RBC # BLD AUTO: 2.98 10*6/MM3 (ref 3.77–5.28)
SODIUM SERPL-SCNC: 139 MMOL/L (ref 136–145)
WBC NRBC COR # BLD AUTO: 3.78 10*3/MM3 (ref 3.4–10.8)

## 2024-02-22 PROCEDURE — 80048 BASIC METABOLIC PNL TOTAL CA: CPT | Performed by: ORTHOPAEDIC SURGERY

## 2024-02-22 PROCEDURE — 85027 COMPLETE CBC AUTOMATED: CPT | Performed by: NURSE PRACTITIONER

## 2024-02-22 PROCEDURE — 97535 SELF CARE MNGMENT TRAINING: CPT | Performed by: OCCUPATIONAL THERAPIST

## 2024-02-22 PROCEDURE — 25010000002 CLINDAMYCIN 900 MG/50ML SOLUTION: Performed by: ORTHOPAEDIC SURGERY

## 2024-02-22 PROCEDURE — 25810000003 SODIUM CHLORIDE 0.9 % SOLUTION: Performed by: ORTHOPAEDIC SURGERY

## 2024-02-22 PROCEDURE — 82948 REAGENT STRIP/BLOOD GLUCOSE: CPT

## 2024-02-22 PROCEDURE — 25010000002 VANCOMYCIN 10 G RECONSTITUTED SOLUTION: Performed by: ORTHOPAEDIC SURGERY

## 2024-02-22 PROCEDURE — 25010000002 ONDANSETRON PER 1 MG: Performed by: NURSE PRACTITIONER

## 2024-02-22 PROCEDURE — 63710000001 INSULIN LISPRO (HUMAN) PER 5 UNITS: Performed by: INTERNAL MEDICINE

## 2024-02-22 PROCEDURE — 97165 OT EVAL LOW COMPLEX 30 MIN: CPT | Performed by: OCCUPATIONAL THERAPIST

## 2024-02-22 PROCEDURE — 94799 UNLISTED PULMONARY SVC/PX: CPT

## 2024-02-22 PROCEDURE — 97162 PT EVAL MOD COMPLEX 30 MIN: CPT

## 2024-02-22 PROCEDURE — 99024 POSTOP FOLLOW-UP VISIT: CPT | Performed by: ORTHOPAEDIC SURGERY

## 2024-02-22 RX ORDER — BISACODYL 5 MG/1
5 TABLET, DELAYED RELEASE ORAL DAILY PRN
Status: DISCONTINUED | OUTPATIENT
Start: 2024-02-22 | End: 2024-02-26 | Stop reason: HOSPADM

## 2024-02-22 RX ORDER — FUROSEMIDE 20 MG/1
20 TABLET ORAL AS NEEDED
Start: 2024-02-25 | End: 2024-02-26 | Stop reason: SDUPTHER

## 2024-02-22 RX ORDER — POLYETHYLENE GLYCOL 3350 17 G/17G
17 POWDER, FOR SOLUTION ORAL DAILY PRN
Status: DISCONTINUED | OUTPATIENT
Start: 2024-02-22 | End: 2024-02-26 | Stop reason: HOSPADM

## 2024-02-22 RX ORDER — METHOCARBAMOL 500 MG/1
500 TABLET, FILM COATED ORAL NIGHTLY PRN
Status: DISCONTINUED | OUTPATIENT
Start: 2024-02-22 | End: 2024-02-26 | Stop reason: HOSPADM

## 2024-02-22 RX ORDER — AMOXICILLIN 250 MG
2 CAPSULE ORAL 2 TIMES DAILY
Status: DISCONTINUED | OUTPATIENT
Start: 2024-02-22 | End: 2024-02-26 | Stop reason: HOSPADM

## 2024-02-22 RX ORDER — BISACODYL 10 MG
10 SUPPOSITORY, RECTAL RECTAL DAILY PRN
Status: DISCONTINUED | OUTPATIENT
Start: 2024-02-22 | End: 2024-02-26 | Stop reason: HOSPADM

## 2024-02-22 RX ADMIN — OXYCODONE HYDROCHLORIDE 5 MG: 5 TABLET ORAL at 20:33

## 2024-02-22 RX ADMIN — OXYCODONE HYDROCHLORIDE 10 MG: 10 TABLET ORAL at 14:56

## 2024-02-22 RX ADMIN — PANTOPRAZOLE SODIUM 40 MG: 40 TABLET, DELAYED RELEASE ORAL at 08:52

## 2024-02-22 RX ADMIN — ASPIRIN 81 MG: 81 TABLET, COATED ORAL at 20:33

## 2024-02-22 RX ADMIN — CLINDAMYCIN PHOSPHATE 900 MG: 900 INJECTION, SOLUTION INTRAVENOUS at 02:30

## 2024-02-22 RX ADMIN — OXYCODONE HYDROCHLORIDE 10 MG: 10 TABLET ORAL at 08:51

## 2024-02-22 RX ADMIN — ASPIRIN 81 MG: 81 TABLET, COATED ORAL at 08:51

## 2024-02-22 RX ADMIN — Medication 3 ML: at 09:28

## 2024-02-22 RX ADMIN — MELOXICAM 15 MG: 15 TABLET ORAL at 08:52

## 2024-02-22 RX ADMIN — INSULIN LISPRO 2 UNITS: 100 INJECTION, SOLUTION INTRAVENOUS; SUBCUTANEOUS at 11:54

## 2024-02-22 RX ADMIN — INSULIN LISPRO 2 UNITS: 100 INJECTION, SOLUTION INTRAVENOUS; SUBCUTANEOUS at 18:16

## 2024-02-22 RX ADMIN — VANCOMYCIN HYDROCHLORIDE 1250 MG: 10 INJECTION, POWDER, LYOPHILIZED, FOR SOLUTION INTRAVENOUS at 09:27

## 2024-02-22 RX ADMIN — SENNOSIDES AND DOCUSATE SODIUM 2 TABLET: 8.6; 5 TABLET ORAL at 20:33

## 2024-02-22 RX ADMIN — ALPRAZOLAM 0.5 MG: 0.5 TABLET ORAL at 20:33

## 2024-02-22 RX ADMIN — METHOCARBAMOL 1000 MG: 500 TABLET ORAL at 20:33

## 2024-02-22 RX ADMIN — ONDANSETRON 4 MG: 2 INJECTION INTRAMUSCULAR; INTRAVENOUS at 20:46
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
## 2024-02-22 NOTE — PLAN OF CARE
Problem: Adult Inpatient Plan of Care  Goal: Plan of Care Review  Outcome: Ongoing, Progressing  Goal: Patient-Specific Goal (Individualized)  Outcome: Ongoing, Progressing  Goal: Absence of Hospital-Acquired Illness or Injury  Outcome: Ongoing, Progressing  Intervention: Identify and Manage Fall Risk  Description: Perform standard risk assessment on admission using a validated tool or comprehensive approach appropriate to the patient; reassess fall risk frequently, with change in status or transfer to another level of care.  Communicate fall injury risk to interprofessional healthcare team.  Determine need for increased observation, equipment and environmental modification, such as low bed, signage and supportive, nonskid footwear.  Adjust safety measures to individual developmental age, stage and identified risk factors.  Reinforce the importance of safety and physical activity with patient and family.  Perform regular intentional rounding to assess need for position change, pain assessment and personal needs, including assistance with toileting.  Recent Flowsheet Documentation  Taken 2/22/2024 0340 by Ghazala Blank RN  Safety Promotion/Fall Prevention: activity supervised  Taken 2/22/2024 0230 by Ghazala Blank RN  Safety Promotion/Fall Prevention: activity supervised  Taken 2/21/2024 2354 by Ghazala Blank RN  Safety Promotion/Fall Prevention: activity supervised  Taken 2/21/2024 2135 by Ghazala Blank RN  Safety Promotion/Fall Prevention: activity supervised  Taken 2/21/2024 2003 by Ghazala Blank RN  Safety Promotion/Fall Prevention: activity supervised  Taken 2/21/2024 1940 by Ghazala Blank RN  Safety Promotion/Fall Prevention: activity supervised  Intervention: Prevent Skin Injury  Description: Perform a screening for skin injury risk, such as pressure or moisture associated skin damage on admission and at regular intervals throughout hospital stay.  Keep all areas of skin  (especially folds) clean and dry.  Maintain adequate skin hydration.  Relieve and redistribute pressure and protect bony prominences; implement measures based on patient-specific risk factors.  Match turning and repositioning schedule to clinical condition.  Encourage weight shift frequently; assist with reposition if unable to complete independently.  Float heels off bed; avoid pressure on the Achilles tendon.  Keep skin free from extended contact with medical devices.  Encourage functional activity and mobility, as early as tolerated.  Use aids (e.g., slide boards, mechanical lift) during transfer.  Recent Flowsheet Documentation  Taken 2/22/2024 0340 by Ghazala Blank RN  Body Position: position changed independently  Skin Protection: adhesive use limited  Taken 2/22/2024 0230 by Ghazala Blank RN  Body Position: position changed independently  Skin Protection: adhesive use limited  Taken 2/21/2024 2354 by Ghazala Blank RN  Body Position: position changed independently  Skin Protection: adhesive use limited  Taken 2/21/2024 2135 by Ghazala Blank RN  Body Position: position changed independently  Skin Protection: adhesive use limited  Taken 2/21/2024 2003 by Ghazala Blank RN  Body Position: position changed independently  Skin Protection: adhesive use limited  Taken 2/21/2024 1940 by Ghazala Blank RN  Body Position: position changed independently  Skin Protection: adhesive use limited  Intervention: Prevent and Manage VTE (Venous Thromboembolism) Risk  Description: Assess for VTE (venous thromboembolism) risk.  Encourage and assist with early ambulation.  Initiate and maintain compression or other therapy, as indicated, based on identified risk in accordance with organizational protocol and provider order.  Encourage both active and passive leg exercises while in bed, if unable to ambulate.  Recent Flowsheet Documentation  Taken 2/22/2024 0340 by Ghazala Blank RN  Activity  Management: activity encouraged  VTE Prevention/Management:   sequential compression devices on   bilateral  Taken 2/22/2024 0230 by Ghazala Blank RN  Activity Management: activity encouraged  VTE Prevention/Management:   sequential compression devices on   bilateral  Taken 2/21/2024 2354 by Ghazala Blank RN  Activity Management: (PT unable to ambulate >20ft p/t MN due to symptomatic hypotension) activity minimized  VTE Prevention/Management:   sequential compression devices on   bilateral  Taken 2/21/2024 2135 by Ghazala Blank RN  Activity Management: activity minimized  VTE Prevention/Management:   sequential compression devices on   bilateral  Taken 2/21/2024 2003 by Ghazala Blank RN  Activity Management: activity minimized  VTE Prevention/Management:   sequential compression devices on   bilateral  Range of Motion: active ROM (range of motion) encouraged  Taken 2/21/2024 1940 by Ghazala Blank RN  Activity Management: activity encouraged  VTE Prevention/Management:   sequential compression devices on   bilateral  Intervention: Prevent Infection  Description: Maintain skin and mucous membrane integrity; promote hand, oral and pulmonary hygiene.  Optimize fluid balance, nutrition, sleep and glycemic control to maximize infection resistance.  Identify potential sources of infection early to prevent or mitigate progression of infection (e.g., wound, lines, devices).  Evaluate ongoing need for invasive devices; remove promptly when no longer indicated.  Recent Flowsheet Documentation  Taken 2/22/2024 0340 by Ghazala Blank RN  Infection Prevention:   environmental surveillance performed   personal protective equipment utilized   rest/sleep promoted   single patient room provided   visitors restricted/screened  Taken 2/22/2024 0230 by Ghazala Blank RN  Infection Prevention:   environmental surveillance performed   personal protective equipment utilized   rest/sleep promoted    single patient room provided   visitors restricted/screened  Taken 2/21/2024 2354 by Ghazala Blank RN  Infection Prevention:   environmental surveillance performed   personal protective equipment utilized   rest/sleep promoted   single patient room provided   visitors restricted/screened  Taken 2/21/2024 2135 by Ghazala Blank RN  Infection Prevention:   environmental surveillance performed   personal protective equipment utilized   rest/sleep promoted   single patient room provided   visitors restricted/screened  Taken 2/21/2024 2003 by Ghazala Blank RN  Infection Prevention:   environmental surveillance performed   personal protective equipment utilized   rest/sleep promoted   single patient room provided   visitors restricted/screened  Taken 2/21/2024 1940 by Ghazala Blank RN  Infection Prevention:   environmental surveillance performed   personal protective equipment utilized   rest/sleep promoted   single patient room provided   visitors restricted/screened  Goal: Optimal Comfort and Wellbeing  Outcome: Ongoing, Progressing  Intervention: Provide Person-Centered Care  Description: Use a family-focused approach to care.  Develop trust and rapport by proactively providing information, encouraging questions, addressing concerns and offering reassurance.  Acknowledge emotional response to hospitalization.  Recognize and utilize personal coping strategies.  Honor spiritual and cultural preferences.  Recent Flowsheet Documentation  Taken 2/21/2024 2003 by Ghazala Blank RN  Trust Relationship/Rapport:   care explained   questions answered   questions encouraged   thoughts/feelings acknowledged  Goal: Readiness for Transition of Care  Outcome: Ongoing, Progressing     Problem: Pain Acute  Goal: Acceptable Pain Control and Functional Ability  Outcome: Ongoing, Progressing  Intervention: Prevent or Manage Pain  Description: Evaluate pain level, effect of treatment and patient response at  regular intervals.  Minimize painful stimuli; coordinate care and adjust environment (e.g., light, noise, unnecessary movement); promote sleep/rest.  Match pharmacologic analgesia to severity and type of pain mechanism (e.g., neuropathic, muscle, inflammatory); consider multimodal approach (e.g., nonopioid, opioid, adjuvant).  Provide medication at regular intervals; titrate to patient response; premedicate for painful procedures.  Manage breakthrough pain with additional doses; consider rotation or switching medication.  Monitor for signs of substance tolerance (increased dose to reach desired effect, decreased effect with same dose).  Manage medication-induced effects, such as constipation, nausea, pruritus, urinary retention, somnolence and dizziness.  Provide multimodal interventions, such as as physical activity, therapeutic exercise, yoga, TENS (transcutaneous electrical nerve stimulation) and manual therapy.  Train in functional activity modifications, such as body mechanics, posture, ergonomics, energy conservation and activity pacing.  Consider addition of complementary or alternative therapy, such as acupuncture, hypnosis or therapeutic touch.  Recent Flowsheet Documentation  Taken 2/22/2024 0340 by Ghazala Blank RN  Medication Review/Management: medications reviewed  Taken 2/22/2024 0230 by Ghazala Blank RN  Medication Review/Management: medications reviewed  Taken 2/21/2024 2354 by Ghazala Blank RN  Medication Review/Management: medications reviewed  Taken 2/21/2024 2135 by Ghazala Blank RN  Medication Review/Management: medications reviewed  Taken 2/21/2024 2003 by Ghazala Blank RN  Medication Review/Management: medications reviewed  Taken 2/21/2024 1940 by Ghazala Blank RN  Medication Review/Management: medications reviewed  Intervention: Optimize Psychosocial Wellbeing  Description: Facilitate patient’s self-control over pain by providing pain information and  allowing choices in treatment.  Consider and address emotional response to pain.  Explore and promote use of coping strategies; address barriers to successful coping.  Evaluate and assist with psychosocial, cultural and spiritual factors impacting pain.  Modify pain perception using techniques, such as distraction, mindfulness, guided imagery, meditation or music.  Assess for risk factors for developing chronic pain, such as depression, fear, pain avoidance and pain catastrophizing.  Consider referral for ongoing coping support, such as education, relaxation training and role of thoughts.  Recent Flowsheet Documentation  Taken 2/21/2024 2003 by Ghazala Blank RN  Diversional Activities:   television   smartphone     Problem: Fall Injury Risk  Goal: Absence of Fall and Fall-Related Injury  Outcome: Ongoing, Progressing  Intervention: Identify and Manage Contributors  Description: Develop a fall prevention plan with the patient and caregiver/family.  Provide reorientation, appropriate sensory stimulation and routines with changes in mental status to decrease risk of fall.  Promote use of personal vision and auditory aids.  Assess assistance level required for safe and effective self-care; provide support as needed, such as toileting, mobilization. For age 65 and older, implement timed toileting with assistance.  Encourage physical activity, such as performance of mobility and self-care at highest level of patient ability, multicomponent exercise program and provision of appropriate assistive devices.  If fall occurs, assess the severity of injury; implement fall injury protocol. Determine the cause and revise fall injury prevention plan.  Regularly review medication contribution to fall risk; adjust medication administration times to minimize risk of falling.  Consider risk related to polypharmacy and age.  Balance adequate pain management with potential for oversedation.  Recent Flowsheet Documentation  Taken  2/22/2024 0340 by Ghazala Blank RN  Medication Review/Management: medications reviewed  Taken 2/22/2024 0230 by Ghazala Blank RN  Medication Review/Management: medications reviewed  Taken 2/21/2024 2354 by Ghazala Blank RN  Medication Review/Management: medications reviewed  Taken 2/21/2024 2135 by Ghazala Blank RN  Medication Review/Management: medications reviewed  Taken 2/21/2024 2003 by Ghazala Blank RN  Medication Review/Management: medications reviewed  Taken 2/21/2024 1940 by Ghazala Blank RN  Medication Review/Management: medications reviewed  Intervention: Promote Injury-Free Environment  Description: Provide a safe, barrier-free environment that encourages independent activity.  Keep care area uncluttered and well-lighted.  Determine need for increased observation or monitoring.  Avoid use of devices that minimize mobility, such as restraints or indwelling urinary catheter.  Recent Flowsheet Documentation  Taken 2/22/2024 0340 by Ghazala Blank RN  Safety Promotion/Fall Prevention: activity supervised  Taken 2/22/2024 0230 by Ghazala Blank RN  Safety Promotion/Fall Prevention: activity supervised  Taken 2/21/2024 2354 by Ghazala Blank RN  Safety Promotion/Fall Prevention: activity supervised  Taken 2/21/2024 2135 by Ghazala Blank RN  Safety Promotion/Fall Prevention: activity supervised  Taken 2/21/2024 2003 by Ghazala Blank RN  Safety Promotion/Fall Prevention: activity supervised  Taken 2/21/2024 1940 by Ghazala Blank RN  Safety Promotion/Fall Prevention: activity supervised     Problem: Adjustment to Surgery (Knee Arthroplasty)  Goal: Optimal Coping  Outcome: Ongoing, Progressing     Problem: Bleeding (Knee Arthroplasty)  Goal: Absence of Bleeding  Outcome: Ongoing, Progressing     Problem: Bowel Motility Impaired (Knee Arthroplasty)  Goal: Effective Bowel Elimination  Outcome: Ongoing, Progressing     Problem: Fluid and Electrolyte  Imbalance (Knee Arthroplasty)  Goal: Fluid and Electrolyte Balance  Outcome: Ongoing, Progressing     Problem: Functional Ability Impaired (Knee Arthroplasty)  Goal: Optimal Functional Ability  Outcome: Ongoing, Progressing  Intervention: Promote Optimal Functional Status  Description: Implement multidisciplinary rehabilitation following early mobility guidelines; coordinate pain control to optimize comfort with activity.  Identify functional limitations, such as ADL (activities of daily living), mobility safety and independence; encourage optimal participation to minimize decline associated with inactivity.  Facilitate functional mobility, such as bed mobility, transfers and ambulation; progress and retrain as tolerated.  Encourage ADL (activities of daily living), such as self-feeding, hygiene and dressing; provide set-up, adaptations, assistance and extra time as needed.  Promote a safe and accessible environment, as well as effective use of assistive devices and equipment.  Pace and cluster activity to balance with rest periods and conserve energy; promote adequate nutrition, sleep and rest.  Facilitate range of motion and prescribed exercises, such as ankle pumps, quad sets, straight leg raises, and heel slides; consider NMES (neuromuscular electrical stimulation) to activate quadriceps.  Evaluate and address performance deficits, such as cognitive, balance and activity tolerance impairments.  Recent Flowsheet Documentation  Taken 2/22/2024 0340 by Ghazala Blank RN  Activity Management: activity encouraged  Taken 2/22/2024 0230 by Ghazala Blank RN  Activity Management: activity encouraged  Taken 2/21/2024 2354 by Ghazala Blank RN  Activity Management: (PT unable to ambulate >20ft p/t MN due to symptomatic hypotension) activity minimized  Taken 2/21/2024 2135 by Ghazala Blank RN  Activity Management: activity minimized  Taken 2/21/2024 2003 by Ghazala Blank RN  Activity Management:  activity minimized  Taken 2/21/2024 1940 by Ghazala Blank RN  Activity Management: activity encouraged     Problem: Infection (Knee Arthroplasty)  Goal: Absence of Infection Signs and Symptoms  Outcome: Ongoing, Progressing  Intervention: Prevent or Manage Infection  Description: Optimize activity and mobility to maximize infection resistance (e.g., reposition, sit in chair, ambulate).  Maintain normothermia and glycemic control to maximize infection resistance.  Maintain dressing and closed drainage system integrity to reduce the risk for infection; inspect incision as visible.  Protect incision from injury (e.g., care with movement, splinting techniques).  Discontinue prophylactic antimicrobial agent within 24 hours after procedure, as directed.  Implement transmission-based precautions and isolation, as indicated, to prevent spread of infection.  Identify early signs of sepsis, such as increased heart rate and decreased blood pressure, as well as changes in mental state, respiratory pattern or peripheral perfusion.  Prepare for rapid sepsis management, including lactate level, intravenous access, fluid administration and oxygen therapy.  Provide fever-reduction and comfort measures.  Recent Flowsheet Documentation  Taken 2/22/2024 0340 by Ghazala Blank, RN  Infection Prevention:   environmental surveillance performed   personal protective equipment utilized   rest/sleep promoted   single patient room provided   visitors restricted/screened  Taken 2/22/2024 0230 by Ghazala Blank, RN  Infection Prevention:   environmental surveillance performed   personal protective equipment utilized   rest/sleep promoted   single patient room provided   visitors restricted/screened  Taken 2/21/2024 2354 by Ghazala Blank, RN  Infection Prevention:   environmental surveillance performed   personal protective equipment utilized   rest/sleep promoted   single patient room provided   visitors  restricted/screened  Taken 2/21/2024 2135 by Ghazala Blank RN  Infection Prevention:   environmental surveillance performed   personal protective equipment utilized   rest/sleep promoted   single patient room provided   visitors restricted/screened  Taken 2/21/2024 2003 by Ghazala Blank RN  Infection Prevention:   environmental surveillance performed   personal protective equipment utilized   rest/sleep promoted   single patient room provided   visitors restricted/screened  Taken 2/21/2024 1940 by Ghazala Blank RN  Infection Prevention:   environmental surveillance performed   personal protective equipment utilized   rest/sleep promoted   single patient room provided   visitors restricted/screened     Problem: Neurovascular Compromise (Knee Arthroplasty)  Goal: Intact Neurovascular Status  Outcome: Ongoing, Progressing     Problem: Ongoing Anesthesia Effects (Knee Arthroplasty)  Goal: Anesthesia/Sedation Recovery  Outcome: Ongoing, Progressing  Intervention: Optimize Anesthesia Recovery  Description: Assess and monitor airway, breathing and circulation; maintain close surveillance for deterioration.  Implement continuous monitoring, such as cardiorespiratory, blood pressure, temperature, pulse oximetry and capnography.  Elevate head of bed, if able; facilitate regular position changes.  Assess neurocognitive function and for risks that may lead to postoperative delirium, such as decreased level of consciousness, pain and agitation; offer reassurance; answer questions.  Assess and monitor neurovascular and neuromuscular function, such as motor strength, tone, posture, peripheral pulses and extremity sensation; protect areas of decreased sensation from heat, cold, medical devices or objects.  Individualize frequency and intensity of monitoring based on sedation or anesthesia administered, identified risk factors, ongoing assessment and organizational protocol.  Prepare for administration of  pharmacologic therapy, such as reversal agent, antiemetic or antipruritic medication, to manage sedation or anesthesia effects.  Adjust environment to maintain safety (e.g., fall precautions, safety equipment).  Recent Flowsheet Documentation  Taken 2/22/2024 0340 by Ghazala Blank RN  Safety Promotion/Fall Prevention: activity supervised  Taken 2/22/2024 0230 by Ghazala Blank RN  Safety Promotion/Fall Prevention: activity supervised  Taken 2/21/2024 2354 by Ghazaal Blank RN  Safety Promotion/Fall Prevention: activity supervised  Taken 2/21/2024 2200 by Ghazala Blank RN  Patient Tolerance (IS): good  Administration (IS):   instruction provided, follow-up   self-administered  Level Incentive Spirometer (mL): 2000  Incentive Spirometer Predicted Level (mL): 1500  Number of Repetitions (IS): 10  Taken 2/21/2024 2135 by Ghazala Blank RN  Safety Promotion/Fall Prevention: activity supervised  Taken 2/21/2024 2003 by Ghazala Blank RN  Patient Tolerance (IS): good  Safety Promotion/Fall Prevention: activity supervised  Administration (IS):   instruction provided, follow-up   self-administered  Level Incentive Spirometer (mL): 2000  Incentive Spirometer Predicted Level (mL): 1500  Number of Repetitions (IS): 10  Taken 2/21/2024 2000 by Ghazala Blank RN  Patient Tolerance (IS): good  Administration (IS):   instruction provided, follow-up   self-administered  Level Incentive Spirometer (mL): 1500  Incentive Spirometer Predicted Level (mL): 1500  Number of Repetitions (IS): 10  Taken 2/21/2024 1940 by Ghazala Blank RN  Safety Promotion/Fall Prevention: activity supervised     Problem: Pain (Knee Arthroplasty)  Goal: Acceptable Pain Control  Outcome: Ongoing, Progressing  Intervention: Prevent or Manage Pain  Description: Set pain management goals; mutually determine pain management plan; review regularly.  Evaluate risk for opioid use; individualize pharmacologic pain management  plan and titrate medication to patient response.  Combine multimodal analgesia and nonpharmacologic strategies to help potentiate synergistic effects, enhance comfort and improve function (e.g., complementary therapy, diversional activity, mindfulness).  Provide around-the-clock dosing of pain medication to keep pain levels in control.  Manage medication-induced effects, such as respiratory depression, constipation, nausea, vomiting.  Minimize pain stimuli; coordinate care and adjust environment (e.g., light, noise, unnecessary movement); promote sleep/rest for optimal healing.  Recent Flowsheet Documentation  Taken 2/21/2024 2003 by Ghazala Blank, RN  Diversional Activities:   television   smartphone     Problem: Postoperative Nausea and Vomiting (Knee Arthroplasty)  Goal: Nausea and Vomiting Relief  Outcome: Ongoing, Progressing     Problem: Postoperative Urinary Retention (Knee Arthroplasty)  Goal: Effective Urinary Elimination  Outcome: Ongoing, Progressing   Goal Outcome Evaluation:            Patient is alert and oriented X4, pleasant, compliant with treatment regimen. Consistently low Bps overnight despite several boluses, HOB elevation, drinking water and consuming soup high in sodium. Patient progressively more symptomatic with fatigue, weakness.Per Dr. PORTER, continue supportive care with fluids, hold metoprolol, xanax, ultram, oxycodone. Will evaluate H&H upon am lab work. Patient anxious, agreeable to treatment plan. Able to void as a X2 to the BS. Patient did not ambulate in the room or outside of going to the [facility] [place], due to symptomatic hypotension. Ghazala Blank RN
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
## 2024-02-22 NOTE — PLAN OF CARE
Goal Outcome Evaluation:  Plan of Care Reviewed With: patient, spouse           Outcome Evaluation: Pt completed bed mobility with mod assist, LB dressing with dependence and BSC transfer with max assist x2. BP supine 111/55 and EOB level 115/54, no c/o dizziness. Issued self-care kit, deferred trial d/t pt's level of fatigue after toileting. Pt limited with generalized weakness and having trouble advancing BLE during transfer training, decreased balance, decreased AROM, pain, anxiety and is performing significantly below baseline status. She lives with spouse and reports h/o multiple recent falls. Recommend IPR and discussed with pt, she is in agreement.      Anticipated Discharge Disposition (OT): inpatient rehabilitation facility

## 2024-02-22 NOTE — PROGRESS NOTES
[facility]    Acute pain service Inpatient Progress Note    Patient Name: Jeevan Cardoso  :  1960  MRN:  [id number]        Acute Pain  Service Inpatient Progress Note:    Analgesia:Good  Pain Score:2/10  LOC: alert and awake  Resp Status: room air  Cardiac: VS stable  Side Effects:None  Catheter Site:clean, dressing intact and dry  Cath type: peripheral nerve cath with ON Q  Infusion rate: Fem/ Add: Basal: 1ml/hr, PIB: 8ml q 8h, PCA: 8ml q 30 min (1mL,8ml, 8ml InfuSystem Pump)  Catheter Plan:Catheter to remain Insitu and Continue catheter infusion rate unchanged  Comments:

## 2024-02-22 NOTE — PLAN OF CARE
Goal Outcome Evaluation:  Plan of Care Reviewed With: patient, spouse        Progress: no change  Outcome Evaluation: PT eval performed: Pt presenting with TKA, weakness, decline in mobility.  STS max-A x2, FWW, difficulty with WS, advancing BLE, mod-A x2 to t/f to recliner.  unable to ambulate.  Recommend IPR      Anticipated Discharge Disposition (PT): inpatient rehabilitation facility

## 2024-02-22 NOTE — PROGRESS NOTES
"IM progress note      Jeevan Cardoso  [id number]  1960     LOS: 0 days     Attending: Olaf Buck MD    Primary Care Provider: Carl Mcnamara MD      Chief Complaint/Reason for visit:  No chief complaint on file.      Subjective   Hypotension issues overnight after low blood pressure prevented patient from working with therapy yesterday.  Blood pressure improved today with IV fluids given overnight.  No nausea or vomiting or shortness of breath.  Patient participated with PT and OT and was recommended for inpatient rehab at this point.    Objective        Visit Vitals  /55 (BP Location: Left arm, Patient Position: Sitting)   Pulse 118   Temp (!) 101.3 °F (38.5 °C) (Axillary)   Resp 16   Ht 157.5 cm (62\")   Wt 81.6 kg (180 lb)   LMP  (LMP Unknown)   SpO2 94%   BMI 32.92 kg/m²     Temp (24hrs), Av.4 °F (37.4 °C), Min:98.2 °F (36.8 °C), Max:101.3 °F (38.5 °C)      Intake/Output:    Intake/Output Summary (Last 24 hours) at 2024  Last data filed at 2024 1756  Gross per 24 hour   Intake 2338.67 ml   Output 600 ml   Net 1738.67 ml        Physical Therapy:    Physical Exam:     General Appearance:    Alert, cooperative, in no acute distress   Head:    Normocephalic, without obvious abnormality, atraumatic    Lungs:     Normal effort, symmetric chest rise,  clear to      auscultation bilaterally              Heart:    Regular rhythm and normal rate, normal S1 and S2    Abdomen:     Normal bowel sounds, no masses, no organomegaly, soft        non-tender, non-distended, no guarding, no rebound                tenderness   Extremities: Clean dry intact dressing over knee.  Peripheral nerve block catheter present.  Intact flexion and dorsiflexion bilateral feet.  No clubbing, cyanosis or edema.  No deformities.    Pulses:   Pulses palpable and equal bilaterally   Skin:   No bleeding, bruising or rash          Results Review:     I reviewed the patient's new clinical results.   Results from " last 7 days   Lab Units 02/22/24  0615   WBC 10*3/mm3 3.78   HEMOGLOBIN g/dL 9.8*   HEMATOCRIT % 30.1*   PLATELETS 10*3/mm3 72*     Results from last 7 days   Lab Units 02/22/24  0615 02/21/24  0939   SODIUM mmol/L 139  --    POTASSIUM mmol/L 4.0 3.4*   CHLORIDE mmol/L 108*  --    CO2 mmol/L 22.0  --    BUN mg/dL 16  --    CREATININE mg/dL 1.03*  --    CALCIUM mg/dL 7.3*  --    GLUCOSE mg/dL 169*  --      I reviewed the patient's new imaging including images and reports.    All medications reviewed.   aspirin, 81 mg, Oral, Q12H  insulin lispro, 2-7 Units, Subcutaneous, 4x Daily AC & at Bedtime  meloxicam, 15 mg, Oral, Daily  methocarbamol, 1,000 mg, Oral, Nightly  metoprolol succinate XL, 50 mg, Oral, Daily  pantoprazole, 40 mg, Oral, Daily  sodium chloride, 3 mL, Intravenous, Q12H      albuterol sulfate HFA, 1 puff, Q4H PRN  ALPRAZolam, 0.5 mg, TID PRN  dextrose, 25 g, Q15 Min PRN  dextrose, 15 g, Q15 Min PRN  glucagon (human recombinant), 1 mg, Q15 Min PRN  HYDROmorphone, 0.5 mg, Q2H PRN   And  naloxone, 0.1 mg, Q5 Min PRN  labetalol, 10 mg, Q4H PRN  methocarbamol, 500 mg, Nightly PRN  ondansetron ODT, 4 mg, Q6H PRN   Or  ondansetron, 4 mg, Q6H PRN  ondansetron, 4 mg, Q6H PRN  oxyCODONE, 10 mg, Q4H PRN  oxyCODONE, 5 mg, Q4H PRN  sodium chloride, 500 mL, TID PRN  sodium chloride, 3-10 mL, PRN        Assessment & Plan       Status post total left knee replacement    Essential hypertension    Hx of gastroesophageal reflux (GERD)    Insomnia    Osteoarthritis    Obstructive sleep apnea on CPAP    Arthritis of knee  Chronic pain.  Obesity.  Anxiety     Plan  1. PT/OT, weightbearing as tolerated left lower extremity  2. Pain control-prns, peripheral nerve block catheter, infuse pump.  Multimodal approach  3. IS-encouraged  4. DVT proph-mechanicals and aspirin   5. Bowel regimen  6. Monitor post-op labs  7. DC planning, inpatient rehab facility,  will see.    Postop hypotension: Monitor closely after IV  fluids.  Holding parameters for antihypertensive medications.  Holding diuretics including spironolactone and Lasix.    -KLAUS:  Continue CPAP.   Monitor O2 sats.    -Anxiety: As needed alprazolam.    -Chronic pain: On Ultram regimen.  Hold while patient is on oxycodone.  Resume Robaxin as needed.    Joe Castano MD  02/22/24  20:04 EST

## 2024-02-22 NOTE — CASE MANAGEMENT/SOCIAL WORK
"Discharge Planning Assessment  [facility]     Patient Name: Jeevan Cardoso  MRN: [id number]  Today's Date: 2/22/2024    Admit Date: 2/21/2024    Plan: [facility]                   Discharge Plan       Row Name 02/22/24 1513       Plan    Plan [facility]    Patient/Family in Agreement with Plan yes    Plan Comments Followed up with Ms. Cardoso and her , at the bedside, for discharge planning.    Ms. Cardoso was evaluated by PT today, and per notes, \"PT eval performed: Pt presenting with TKA, weakness, decline in mobility.  STS max-A x2, FWW, difficulty with WS, advancing BLE, mod-A x2 to t/f to recliner.  unable to ambulate.  Recommend IPR.\"    Discussed inpatient rehab and Ms. Cardoso requested a referral to [facility].  Referral given to Ebony with [facility].  Once accepted by the facility, a prior auth with Ms. Cardoso's [employer] insurance will be required for the rehab admission.  Briefly discussed with the patient that a prior auth with [employer] can take several days and if she progresses with therapy, the option to return home is possible.    CM will continue to follow.    Final Discharge Disposition Code 03 - skilled nursing facility (SNF)                  Continued Care and Services - Admitted Since 2/21/2024       Destination       Service Provider Request Status Selected Services Address Phone Fax Patient Preferred    [facility] Pending - No Request Sent N/A [address] [address] [phone] --              Therapy       Service Provider Request Status Selected Services Address Phone Fax Patient Preferred    [facility]  Selected Outpatient Physical Therapy [address] [id number] [phone] --                  Expected Discharge Date and Time       Expected Discharge Date Expected Discharge Time    Feb 26, 2024               Mya Payton RN    "

## 2024-02-22 NOTE — PROGRESS NOTES
"  SUBJECTIVE  Patient sitting on bedside toilet.  Pain controlled.  Hypotensive yesterday postanesthesia preventing therapy assessment, now improving.    PHYSICAL THERAPY PROGRESS        OBJECTIVE  Temp (24hrs), Av.5 °F (36.9 °C), Min:97.2 °F (36.2 °C), Max:99.3 °F (37.4 °C)    Blood pressure 101/53, pulse 93, temperature 98.2 °F (36.8 °C), temperature source Oral, resp. rate 16, height 157.5 cm (62\"), weight 81.6 kg (180 lb), SpO2 95%, not currently breastfeeding.    Lab Results (last 24 hours)       Procedure Component Value Units Date/Time    CBC (No Diff) [[id number]] Collected: 24    Specimen: Blood Updated: 24    Basic Metabolic Panel [[id number]] Collected: 24    Specimen: Blood Updated: 24    POC Glucose Once [[id number]]  (Abnormal) Collected: 24    Specimen: Blood Updated: 24     Glucose 245 mg/dL     POC Glucose Once [[id number]]  (Normal) Collected: 24 1339    Specimen: Blood Updated: 24 1340     Glucose 128 mg/dL     Potassium [[id number]]  (Abnormal) Collected: 24 0939    Specimen: Blood Updated: 24 0953     Potassium 3.4 mmol/L     POC Glucose Once [[id number]]  (Normal) Collected: 24 0858    Specimen: Blood Updated: 24 0858     Glucose 112 mg/dL               PHYSICAL EXAM  Left lower extremity: Dressing clean, dry and intact.  Able to perform straight leg raise without assist.  Intact EHL, FHL, tibialis anterior, and gastrocsoleus. Sensation intact to light touch to deep peroneal, superficial peroneal, sural, saphenous, tibial nerves. 2+ palpable DP and PT pulses.         Status post total left knee replacement    Essential hypertension    Osteoarthritis    Arthritis of knee      PLAN / DISPOSITION:  1 Day Post-Op left total knee arthroplasty    Protected weight bearing as tolerated left lower extremity, knee range of motion as tolerated  Pain control  PT/OT for post op mobilization and medical " equipment needs   23 hours perioperative antibiotic prophylaxis   SCD's bilateral lower extremities   Aspirin for DVT prophylaxis   Social work for discharge planning.  Anticipate discharge home today pending PT clearance.  Dressing to remain in place for 7 days. May remove on POD#7. If no drainage, may shower on POD#10. No submerging wound in water. If drainage is noted, sterile dressing should be placed and wound checked daily. No showering until wound has remained dry for 72 consecutive hours.   Follow up in 3 weeks for re-assessment.      Future Appointments   Date Time Provider Department Center   3/12/2024  1:30 PM Milagros Cuello PA-C [facility]   4/3/2024 12:30 PM Shyanne Anand PA-C [facility]       Olaf Buck MD  02/22/24  07:17 EST

## 2024-02-23 ENCOUNTER — APPOINTMENT (OUTPATIENT)
Dept: GENERAL RADIOLOGY | Facility: HOSPITAL | Age: 64
DRG: 470 | End: 2024-02-23
Payer: MEDICARE

## 2024-02-23 PROBLEM — Z96.659 STATUS POST KNEE REPLACEMENT: Status: ACTIVE | Noted: 2024-02-23

## 2024-02-23 LAB
ANION GAP SERPL CALCULATED.3IONS-SCNC: 12 MMOL/L (ref 5–15)
BACTERIA UR QL AUTO: ABNORMAL /HPF
BASOPHILS # BLD AUTO: 0.02 10*3/MM3 (ref 0–0.2)
BASOPHILS NFR BLD AUTO: 0.3 % (ref 0–1.5)
BILIRUB UR QL STRIP: ABNORMAL
BUN SERPL-MCNC: 16 MG/DL (ref 8–23)
BUN/CREAT SERPL: 15.7 (ref 7–25)
CALCIUM SPEC-SCNC: 7.5 MG/DL (ref 8.6–10.5)
CHLORIDE SERPL-SCNC: 105 MMOL/L (ref 98–107)
CLARITY UR: ABNORMAL
CO2 SERPL-SCNC: 20 MMOL/L (ref 22–29)
COLOR UR: ABNORMAL
CREAT SERPL-MCNC: 1.02 MG/DL (ref 0.57–1)
DEPRECATED RDW RBC AUTO: 56.3 FL (ref 37–54)
EGFRCR SERPLBLD CKD-EPI 2021: 62.7 ML/MIN/1.73
EOSINOPHIL # BLD AUTO: 0 10*3/MM3 (ref 0–0.4)
EOSINOPHIL NFR BLD AUTO: 0 % (ref 0.3–6.2)
ERYTHROCYTE [DISTWIDTH] IN BLOOD BY AUTOMATED COUNT: 15.1 % (ref 12.3–15.4)
GLUCOSE BLDC GLUCOMTR-MCNC: 139 MG/DL (ref 70–130)
GLUCOSE BLDC GLUCOMTR-MCNC: 149 MG/DL (ref 70–130)
GLUCOSE BLDC GLUCOMTR-MCNC: 167 MG/DL (ref 70–130)
GLUCOSE BLDC GLUCOMTR-MCNC: 176 MG/DL (ref 70–130)
GLUCOSE SERPL-MCNC: 150 MG/DL (ref 65–99)
GLUCOSE UR STRIP-MCNC: NEGATIVE MG/DL
HCT VFR BLD AUTO: 34 % (ref 34–46.6)
HGB BLD-MCNC: 11.3 G/DL (ref 12–15.9)
HGB UR QL STRIP.AUTO: ABNORMAL
HYALINE CASTS UR QL AUTO: ABNORMAL /LPF
IMM GRANULOCYTES # BLD AUTO: 0.01 10*3/MM3 (ref 0–0.05)
IMM GRANULOCYTES NFR BLD AUTO: 0.2 % (ref 0–0.5)
KETONES UR QL STRIP: ABNORMAL
LEUKOCYTE ESTERASE UR QL STRIP.AUTO: ABNORMAL
LYMPHOCYTES # BLD AUTO: 0.87 10*3/MM3 (ref 0.7–3.1)
LYMPHOCYTES NFR BLD AUTO: 13.2 % (ref 19.6–45.3)
MCH RBC QN AUTO: 33.8 PG (ref 26.6–33)
MCHC RBC AUTO-ENTMCNC: 33.2 G/DL (ref 31.5–35.7)
MCV RBC AUTO: 101.8 FL (ref 79–97)
MONOCYTES # BLD AUTO: 0.91 10*3/MM3 (ref 0.1–0.9)
MONOCYTES NFR BLD AUTO: 13.8 % (ref 5–12)
NEUTROPHILS NFR BLD AUTO: 4.78 10*3/MM3 (ref 1.7–7)
NEUTROPHILS NFR BLD AUTO: 72.5 % (ref 42.7–76)
NITRITE UR QL STRIP: NEGATIVE
NRBC BLD AUTO-RTO: 0 /100 WBC (ref 0–0.2)
PH UR STRIP.AUTO: 5.5 [PH] (ref 5–8)
PLATELET # BLD AUTO: 72 10*3/MM3 (ref 140–450)
PMV BLD AUTO: 10.5 FL (ref 6–12)
POTASSIUM SERPL-SCNC: 3.9 MMOL/L (ref 3.5–5.2)
PROT UR QL STRIP: ABNORMAL
RBC # BLD AUTO: 3.34 10*6/MM3 (ref 3.77–5.28)
RBC # UR STRIP: ABNORMAL /HPF
REF LAB TEST METHOD: ABNORMAL
SODIUM SERPL-SCNC: 137 MMOL/L (ref 136–145)
SP GR UR STRIP: 1.03 (ref 1–1.03)
SQUAMOUS #/AREA URNS HPF: ABNORMAL /HPF
UROBILINOGEN UR QL STRIP: ABNORMAL
WBC # UR STRIP: ABNORMAL /HPF
WBC NRBC COR # BLD AUTO: 6.59 10*3/MM3 (ref 3.4–10.8)

## 2024-02-23 PROCEDURE — 81001 URINALYSIS AUTO W/SCOPE: CPT | Performed by: INTERNAL MEDICINE

## 2024-02-23 PROCEDURE — 97530 THERAPEUTIC ACTIVITIES: CPT

## 2024-02-23 PROCEDURE — 71045 X-RAY EXAM CHEST 1 VIEW: CPT

## 2024-02-23 PROCEDURE — 99024 POSTOP FOLLOW-UP VISIT: CPT | Performed by: ORTHOPAEDIC SURGERY

## 2024-02-23 PROCEDURE — 97116 GAIT TRAINING THERAPY: CPT

## 2024-02-23 PROCEDURE — 85025 COMPLETE CBC W/AUTO DIFF WBC: CPT | Performed by: INTERNAL MEDICINE

## 2024-02-23 PROCEDURE — 97110 THERAPEUTIC EXERCISES: CPT

## 2024-02-23 PROCEDURE — 25810000003 SODIUM CHLORIDE 0.9 % SOLUTION: Performed by: ORTHOPAEDIC SURGERY

## 2024-02-23 PROCEDURE — 80048 BASIC METABOLIC PNL TOTAL CA: CPT | Performed by: ORTHOPAEDIC SURGERY

## 2024-02-23 PROCEDURE — 82948 REAGENT STRIP/BLOOD GLUCOSE: CPT

## 2024-02-23 PROCEDURE — 63710000001 INSULIN LISPRO (HUMAN) PER 5 UNITS: Performed by: INTERNAL MEDICINE

## 2024-02-23 RX ORDER — HYDROXYZINE HYDROCHLORIDE 25 MG/1
25 TABLET, FILM COATED ORAL EVERY 6 HOURS PRN
Status: DISCONTINUED | OUTPATIENT
Start: 2024-02-23 | End: 2024-02-26 | Stop reason: HOSPADM

## 2024-02-23 RX ORDER — DICYCLOMINE HYDROCHLORIDE 10 MG/1
20 CAPSULE ORAL 3 TIMES DAILY PRN
Status: DISCONTINUED | OUTPATIENT
Start: 2024-02-23 | End: 2024-02-26 | Stop reason: HOSPADM

## 2024-02-23 RX ORDER — METOPROLOL SUCCINATE 50 MG/1
50 TABLET, EXTENDED RELEASE ORAL NIGHTLY
Status: DISCONTINUED | OUTPATIENT
Start: 2024-02-23 | End: 2024-02-26 | Stop reason: HOSPADM

## 2024-02-23 RX ADMIN — SENNOSIDES AND DOCUSATE SODIUM 2 TABLET: 8.6; 5 TABLET ORAL at 08:09

## 2024-02-23 RX ADMIN — HYDROXYZINE HYDROCHLORIDE 25 MG: 25 TABLET, FILM COATED ORAL at 21:33

## 2024-02-23 RX ADMIN — OXYCODONE HYDROCHLORIDE 10 MG: 10 TABLET ORAL at 01:11

## 2024-02-23 RX ADMIN — INSULIN LISPRO 2 UNITS: 100 INJECTION, SOLUTION INTRAVENOUS; SUBCUTANEOUS at 16:54

## 2024-02-23 RX ADMIN — OXYCODONE HYDROCHLORIDE 10 MG: 10 TABLET ORAL at 15:17

## 2024-02-23 RX ADMIN — HYDROXYZINE HYDROCHLORIDE 25 MG: 25 TABLET, FILM COATED ORAL at 08:08

## 2024-02-23 RX ADMIN — ALPRAZOLAM 0.5 MG: 0.5 TABLET ORAL at 10:36

## 2024-02-23 RX ADMIN — SODIUM CHLORIDE 100 ML/HR: 9 INJECTION, SOLUTION INTRAVENOUS at 09:55

## 2024-02-23 RX ADMIN — SODIUM CHLORIDE 100 ML/HR: 9 INJECTION, SOLUTION INTRAVENOUS at 21:10

## 2024-02-23 RX ADMIN — OXYCODONE HYDROCHLORIDE 10 MG: 10 TABLET ORAL at 08:07

## 2024-02-23 RX ADMIN — ASPIRIN 81 MG: 81 TABLET, COATED ORAL at 21:33

## 2024-02-23 RX ADMIN — DICYCLOMINE HYDROCHLORIDE 20 MG: 10 CAPSULE ORAL at 15:47

## 2024-02-23 RX ADMIN — METOPROLOL SUCCINATE 50 MG: 50 TABLET, EXTENDED RELEASE ORAL at 02:43

## 2024-02-23 RX ADMIN — METHOCARBAMOL 1000 MG: 500 TABLET ORAL at 21:33

## 2024-02-23 RX ADMIN — ASPIRIN 81 MG: 81 TABLET, COATED ORAL at 08:07

## 2024-02-23 RX ADMIN — Medication 3 ML: at 21:34

## 2024-02-23 RX ADMIN — Medication 3 ML: at 08:09

## 2024-02-23 RX ADMIN — SENNOSIDES AND DOCUSATE SODIUM 2 TABLET: 8.6; 5 TABLET ORAL at 21:32

## 2024-02-23 RX ADMIN — ALPRAZOLAM 0.5 MG: 0.5 TABLET ORAL at 21:32

## 2024-02-23 RX ADMIN — MELOXICAM 15 MG: 15 TABLET ORAL at 08:07

## 2024-02-23 RX ADMIN — INSULIN LISPRO 2 UNITS: 100 INJECTION, SOLUTION INTRAVENOUS; SUBCUTANEOUS at 21:32

## 2024-02-23 RX ADMIN — PANTOPRAZOLE SODIUM 40 MG: 40 TABLET, DELAYED RELEASE ORAL at 08:09

## 2024-02-23 NOTE — PROGRESS NOTES
[place]    Acute pain service Inpatient Progress Note    Patient Name: Jeevan Cardoso  :  1960  MRN:  [id number]        Acute Pain  Service Inpatient Progress Note:    Analgesia:Good  Pain Score:3/10  LOC: alert and awake  Resp Status: room air  Cardiac: VS stable  Side Effects:None  Catheter Site:clean, dressing intact and dry  Cath type: peripheral nerve cath with ON Q  Volume: 1mL,8ml, 8ml InfuSystem Pump.  Catheter Plan:Catheter to remain Insitu and Continue catheter infusion rate unchanged  Comments:

## 2024-02-23 NOTE — NURSING NOTE
Pt continues to have a low grade temp at times  Pt has not had a BM since 2-20.  States this is not uncommon for her and that she uses Bentyl at home with good results.  Pt is getting Senna in the AM's without elimination success.  Pt appeared to do somewhat better with PT today.  She continues to get good paiin relief with oral pain meds of Oxycodone. Ropivicaine continues to infuse.  We anticipate transfer to [facility] on 2/26/24.

## 2024-02-23 NOTE — PROGRESS NOTES
"IM progress note      Jeevan Cardoso  [id number]  1960     LOS: 0 days     Attending: Olaf Buck MD    Primary Care Provider: Carl Mcnamara MD      Chief Complaint/Reason for visit:  No chief complaint on file.      Subjective   24: Hypotension issues overnight after low blood pressure prevented patient from working with therapy yesterday.  Blood pressure improved today with IV fluids given overnight.  No nausea or vomiting or shortness of breath.  Patient participated with PT and OT and was recommended for inpatient rehab at this point.    24:  Feels better overall today.  Was able to receive her beta-blocker yesterday evening.  This helped her sinus tachycardia.  She had low-grade temperature better today.  Limited progress with PT.  Objective        Visit Vitals  /50 (BP Location: Right arm, Patient Position: Lying)   Pulse 89   Temp 99 °F (37.2 °C) (Oral)   Resp 18   Ht 157.5 cm (62\")   Wt 81.6 kg (180 lb)   LMP  (LMP Unknown)   SpO2 92%   BMI 32.92 kg/m²     Temp (24hrs), Av.6 °F (38.1 °C), Min:98.8 °F (37.1 °C), Max:101.7 °F (38.7 °C)      Intake/Output:    Intake/Output Summary (Last 24 hours) at 2024 1444  Last data filed at 2024 1303  Gross per 24 hour   Intake 1966.33 ml   Output 275 ml   Net 1691.33 ml        Physical Therapy:  Plan of Care Reviewed With: patient, spouse  Progress: improving  Outcome Evaluation: Pt. continues to present below baseline function w/decreased strength and AROM of L knee affecting her ability to safely participate in functional mobility. She performed bed mobility, transfers and ambulated 4' w/front wheeled walker, mod assist of 2. Activity limited by weakness, fatigue. Pt. tolerated progression in ther-ex well. Continue IPPT POC to progress as tolerated.        Anticipated Discharge Disposition (PT): inpatient rehabilitation facility     Physical Exam:     General Appearance:    Alert, cooperative, in no acute distress   Head:    " Normocephalic, without obvious abnormality, atraumatic    Lungs:     Normal effort, symmetric chest rise,  clear to      auscultation bilaterally              Heart:    Regular rhythm and normal rate, normal S1 and S2    Abdomen:     Normal bowel sounds, no masses, no organomegaly, soft        non-tender, non-distended, no guarding, no rebound                tenderness   Extremities: Clean dry intact dressing over knee.  Peripheral nerve block catheter present.  Intact flexion and dorsiflexion bilateral feet.  No clubbing, cyanosis or edema.  No deformities.    Pulses:   Pulses palpable and equal bilaterally   Skin:   No bleeding, bruising or rash          Results Review:     I reviewed the patient's new clinical results.   Results from last 7 days   Lab Units 02/23/24  0302 02/22/24  0615   WBC 10*3/mm3 6.59 3.78   HEMOGLOBIN g/dL 11.3* 9.8*   HEMATOCRIT % 34.0 30.1*   PLATELETS 10*3/mm3 72* 72*     Results from last 7 days   Lab Units 02/23/24  0302 02/22/24  0615 02/21/24  0939   SODIUM mmol/L 137 139  --    POTASSIUM mmol/L 3.9 4.0 3.4*   CHLORIDE mmol/L 105 108*  --    CO2 mmol/L 20.0* 22.0  --    BUN mg/dL 16 16  --    CREATININE mg/dL 1.02* 1.03*  --    CALCIUM mg/dL 7.5* 7.3*  --    GLUCOSE mg/dL 150* 169*  --      I reviewed the patient's new imaging including images and reports.    All medications reviewed.   aspirin, 81 mg, Oral, Q12H  insulin lispro, 2-7 Units, Subcutaneous, 4x Daily AC & at Bedtime  meloxicam, 15 mg, Oral, Daily  methocarbamol, 1,000 mg, Oral, Nightly  metoprolol succinate XL, 50 mg, Oral, Nightly  pantoprazole, 40 mg, Oral, Daily  senna-docusate sodium, 2 tablet, Oral, BID  sodium chloride, 3 mL, Intravenous, Q12H      albuterol sulfate HFA, 1 puff, Q4H PRN  ALPRAZolam, 0.5 mg, TID PRN  polyethylene glycol, 17 g, Daily PRN   And  bisacodyl, 5 mg, Daily PRN   And  bisacodyl, 10 mg, Daily PRN  dextrose, 25 g, Q15 Min PRN  dextrose, 15 g, Q15 Min PRN  glucagon (human recombinant), 1 mg,  Q15 Min PRN  HYDROmorphone, 0.5 mg, Q2H PRN   And  naloxone, 0.1 mg, Q5 Min PRN  hydrOXYzine, 25 mg, Q6H PRN  labetalol, 10 mg, Q4H PRN  methocarbamol, 500 mg, Nightly PRN  ondansetron ODT, 4 mg, Q6H PRN   Or  ondansetron, 4 mg, Q6H PRN  ondansetron, 4 mg, Q6H PRN  oxyCODONE, 10 mg, Q4H PRN  oxyCODONE, 5 mg, Q4H PRN  sodium chloride, 500 mL, TID PRN  sodium chloride, 3-10 mL, PRN        Assessment & Plan       Status post total left knee replacement    Restless legs syndrome (RLS)    Thrombocytopenia    Essential hypertension    Hx of gastroesophageal reflux (GERD)    Insomnia    Osteoarthritis    Obstructive sleep apnea on CPAP    Arthritis of knee    Status post knee replacement  Chronic pain.  Obesity.  Anxiety     Plan  1. PT/OT, weightbearing as tolerated left lower extremity  2. Pain control-prns, peripheral nerve block catheter, infuse pump.  Multimodal approach  3. IS-encouraged  4. DVT proph-mechanicals and aspirin   5. Bowel regimen  6. Monitor post-op labs  7. DC planning, inpatient rehab facility,  following.     Postop hypotension: Monitor closely after IV fluids.    Holding parameters for antihypertensive medications.  Holding diuretics including spironolactone and Lasix.    -KLAUS:  Continue CPAP.   Monitor O2 sats.    -Anxiety: As needed alprazolam.    -Chronic pain: On Ultram regimen.  Hold while patient is on oxycodone.  Resume Robaxin as needed.    Joe Castano MD  02/23/24  14:44 EST
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
## 2024-02-23 NOTE — CASE MANAGEMENT/SOCIAL WORK
Discharge Planning Assessment  [facility]     Patient Name: Jeevan Cardoso  MRN: [id number]  Today's Date: 2/23/2024    Admit Date: 2/21/2024    Plan: [facility], pending insurance authorization                   Discharge Plan       Row Name 02/23/24 1449       Plan    Plan [facility], pending insurance authorization    Patient/Family in Agreement with Plan yes    Plan Comments Ms. Cardoso was referred to April with [facility] yesterday, at her request.    [facility] has accepted Ms. Cardoso for inpatient rehab and they will initiate the prior authorization with the patient's [employer] for the rehab admission.    The insurance auth will likely be received next week.    Ms. Cardoso is agreeable to the DC plan.    CM will follow up.    Final Discharge Disposition Code 03 - skilled nursing facility (SNF)                  Continued Care and Services - Admitted Since 2/21/2024       Destination       Service Provider Request Status Selected Services Address Phone Fax Patient Preferred    [facility] Pending - No Request Sent N/A [address] [facility] [id number] [phone] --                               Expected Discharge Date and Time       Expected Discharge Date Expected Discharge Time    Feb 26, 2024                        Mya Payton RN

## 2024-02-23 NOTE — PROGRESS NOTES
"  SUBJECTIVE  Patient remains weak with poor participation with therapy due to generalized weakness.  Unable to ambulate yesterday.    PHYSICAL THERAPY PROGRESS  Outcome Evaluation: PT eval performed: Pt presenting with TKA, weakness, decline in mobility.  STS max-A x2, FWW, difficulty with WS, advancing BLE, mod-A x2 to t/f to recliner.  unable to ambulate.  Recommend IPR ([id number])     OBJECTIVE  Temp (24hrs), Av.5 °F (38.1 °C), Min:98.2 °F (36.8 °C), Max:101.7 °F (38.7 °C)    Blood pressure 103/64, pulse 92, temperature (!) 101.4 °F (38.6 °C), temperature source Oral, resp. rate 18, height 157.5 cm (62\"), weight 81.6 kg (180 lb), SpO2 98%, not currently breastfeeding.    Lab Results (last 24 hours)       Procedure Component Value Units Date/Time    Basic Metabolic Panel [[id number]]  (Abnormal) Collected: 24    Specimen: Blood Updated: 24     Glucose 150 mg/dL      BUN 16 mg/dL      Creatinine 1.02 mg/dL      Sodium 137 mmol/L      Potassium 3.9 mmol/L      Chloride 105 mmol/L      CO2 20.0 mmol/L      Calcium 7.5 mg/dL      BUN/Creatinine Ratio 15.7     Anion Gap 12.0 mmol/L      eGFR 62.7 mL/min/1.73     Narrative:      GFR Normal >60  Chronic Kidney Disease <60  Kidney Failure <15      CBC & Differential [[id number]]  (Abnormal) Collected: 24    Specimen: Blood Updated: 24    Narrative:      The following orders were created for panel order CBC & Differential.  Procedure                               Abnormality         Status                     ---------                               -----------         ------                     CBC Auto Differential[[id number]]        Abnormal            Final result                 Please view results for these tests on the individual orders.    CBC Auto Differential [[id number]]  (Abnormal) Collected: 24    Specimen: Blood Updated: 24     WBC 6.59 10*3/mm3      RBC 3.34 10*6/mm3      Hemoglobin 11.3 " g/dL      Hematocrit 34.0 %      .8 fL      MCH 33.8 pg      MCHC 33.2 g/dL      RDW 15.1 %      RDW-SD 56.3 fl      MPV 10.5 fL      Platelets 72 10*3/mm3      Neutrophil % 72.5 %      Lymphocyte % 13.2 %      Monocyte % 13.8 %      Eosinophil % 0.0 %      Basophil % 0.3 %      Immature Grans % 0.2 %      Neutrophils, Absolute 4.78 10*3/mm3      Lymphocytes, Absolute 0.87 10*3/mm3      Monocytes, Absolute 0.91 10*3/mm3      Eosinophils, Absolute 0.00 10*3/mm3      Basophils, Absolute 0.02 10*3/mm3      Immature Grans, Absolute 0.01 10*3/mm3      nRBC 0.0 /100 WBC     Urinalysis With Culture If Indicated - Straight Cath [[id number]]  (Abnormal) Collected: 02/23/24 0059    Specimen: Urine from Straight Cath Updated: 02/23/24 0128     Color, UA Dark Yellow     Appearance, UA Cloudy     pH, UA 5.5     Specific Gravity, UA 1.033     Glucose, UA Negative     Ketones, UA Trace     Bilirubin, UA Small (1+)     Blood, UA Moderate (2+)     Protein, UA Trace     Leuk Esterase, UA Trace     Nitrite, UA Negative     Urobilinogen, UA 1.0 E.U./dL    Narrative:      In absence of clinical symptoms, the presence of pyuria, bacteria, and/or nitrites on the urinalysis result does not correlate with infection.    Urinalysis, Microscopic Only - Straight Cath [[id number]]  (Abnormal) Collected: 02/23/24 0059    Specimen: Urine from Straight Cath Updated: 02/23/24 0128     RBC, UA 21-50 /HPF      WBC, UA 0-2 /HPF      Comment: Urine culture not indicated.        Bacteria, UA None Seen /HPF      Squamous Epithelial Cells, UA 3-6 /HPF      Hyaline Casts, UA 7-12 /LPF      Methodology Automated Microscopy    POC Glucose Once [[id number]]  (Abnormal) Collected: 02/22/24 2143    Specimen: Blood Updated: 02/22/24 2145     Glucose 155 mg/dL     POC Glucose Once [[id number]]  (Abnormal) Collected: 02/22/24 1634    Specimen: Blood Updated: 02/22/24 1636     Glucose 171 mg/dL     POC Glucose Once [[id number]]  (Abnormal) Collected: 02/22/24 1119     Specimen: Blood Updated: 02/22/24 1123     Glucose 168 mg/dL     CBC (No Diff) [[id number]]  (Abnormal) Collected: 02/22/24 0615    Specimen: Blood Updated: 02/22/24 0749     WBC 3.78 10*3/mm3      RBC 2.98 10*6/mm3      Hemoglobin 9.8 g/dL      Hematocrit 30.1 %      .0 fL      MCH 32.9 pg      MCHC 32.6 g/dL      RDW 14.7 %      RDW-SD 54.4 fl      MPV 10.0 fL      Platelets 72 10*3/mm3     Narrative:      Verified by repeat analysis.      Basic Metabolic Panel [[id number]]  (Abnormal) Collected: 02/22/24 0615    Specimen: Blood Updated: 02/22/24 0733     Glucose 169 mg/dL      BUN 16 mg/dL      Creatinine 1.03 mg/dL      Sodium 139 mmol/L      Potassium 4.0 mmol/L      Chloride 108 mmol/L      CO2 22.0 mmol/L      Calcium 7.3 mg/dL      BUN/Creatinine Ratio 15.5     Anion Gap 9.0 mmol/L      eGFR 62.0 mL/min/1.73     Narrative:      GFR Normal >60  Chronic Kidney Disease <60  Kidney Failure <15      POC Glucose Once [[id number]]  (Abnormal) Collected: 02/22/24 0723    Specimen: Blood Updated: 02/22/24 0725     Glucose 131 mg/dL               PHYSICAL EXAM  Left lower extremity: Dressing clean, dry and intact.  Able to perform straight leg raise with significant effort.  Intact EHL, FHL, tibialis anterior, and gastrocsoleus. Sensation intact to light touch to deep peroneal, superficial peroneal, sural, saphenous, tibial nerves. 2+ palpable DP and PT pulses.         Status post total left knee replacement    Essential hypertension    Hx of gastroesophageal reflux (GERD)    Insomnia    Osteoarthritis    Obstructive sleep apnea on CPAP    Arthritis of knee      PLAN / DISPOSITION:  2 Days Post-Op left total knee arthroplasty    Protected weight bearing as tolerated left lower extremity, knee range of motion as tolerated  Encourage incentive spirometry and mobilization out of bed.  Questionable urinary tract infection versus colonization.  Patient asymptomatic.  Treatment per hospitalist team.  Pain control  -pain pump stopped to improve quad strength  PT/OT for post op mobilization and medical equipment needs   SCD's bilateral lower extremities   Aspirin for DVT prophylaxis   Social work for discharge planning.  Anticipate discharge to rehab facility dressing to remain in place for 7 days. May remove on POD#7. If no drainage, may shower on POD#10. No submerging wound in water. If drainage is noted, sterile dressing should be placed and wound checked daily. No showering until wound has remained dry for 72 consecutive hours.   Follow up in 3 weeks for re-assessment.      Future Appointments   Date Time Provider Department Center   3/12/2024  1:30 PM Milagros Cuello PA-C [facility]   4/3/2024 12:30 PM Shyanne Anand PA-C [facility]       Olaf Buck MD  02/23/24  07:08 EST
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
## 2024-02-23 NOTE — PLAN OF CARE
Goal Outcome Evaluation: Pts pain well controlled with oral pain meds. Pt tolerated PT somewhat better today.  Pt continues to have a low grade temp at times.

## 2024-02-23 NOTE — PLAN OF CARE
Goal Outcome Evaluation:  Plan of Care Reviewed With: patient, spouse        Progress: improving  Outcome Evaluation: Pt. continues to present below baseline function w/decreased strength and AROM of L knee affecting her ability to safely participate in functional mobility. She performed transfers and ambulated 6' w/front wheeled walker, mod assist of 2. Activity limited by weakness, fatigue. Pt. tolerated progression in ther-ex well. Continue IPPT POC to progress as tolerated.      Anticipated Discharge Disposition (PT): inpatient rehabilitation facility

## 2024-02-24 LAB
GLUCOSE BLDC GLUCOMTR-MCNC: 160 MG/DL (ref 70–130)
GLUCOSE BLDC GLUCOMTR-MCNC: 166 MG/DL (ref 70–130)
GLUCOSE BLDC GLUCOMTR-MCNC: 171 MG/DL (ref 70–130)
GLUCOSE BLDC GLUCOMTR-MCNC: 199 MG/DL (ref 70–130)

## 2024-02-24 PROCEDURE — 97530 THERAPEUTIC ACTIVITIES: CPT

## 2024-02-24 PROCEDURE — 97110 THERAPEUTIC EXERCISES: CPT

## 2024-02-24 PROCEDURE — 97116 GAIT TRAINING THERAPY: CPT

## 2024-02-24 PROCEDURE — 63710000001 INSULIN LISPRO (HUMAN) PER 5 UNITS: Performed by: INTERNAL MEDICINE

## 2024-02-24 PROCEDURE — 82948 REAGENT STRIP/BLOOD GLUCOSE: CPT

## 2024-02-24 PROCEDURE — 25810000003 SODIUM CHLORIDE 0.9 % SOLUTION: Performed by: ORTHOPAEDIC SURGERY

## 2024-02-24 RX ADMIN — OXYCODONE HYDROCHLORIDE 10 MG: 10 TABLET ORAL at 15:04

## 2024-02-24 RX ADMIN — ALPRAZOLAM 0.5 MG: 0.5 TABLET ORAL at 21:20

## 2024-02-24 RX ADMIN — BISACODYL 10 MG: 10 SUPPOSITORY RECTAL at 08:11

## 2024-02-24 RX ADMIN — METHOCARBAMOL 1000 MG: 500 TABLET ORAL at 21:20

## 2024-02-24 RX ADMIN — ASPIRIN 81 MG: 81 TABLET, COATED ORAL at 21:20

## 2024-02-24 RX ADMIN — PANTOPRAZOLE SODIUM 40 MG: 40 TABLET, DELAYED RELEASE ORAL at 08:16

## 2024-02-24 RX ADMIN — ASPIRIN 81 MG: 81 TABLET, COATED ORAL at 08:14

## 2024-02-24 RX ADMIN — Medication 3 ML: at 21:22

## 2024-02-24 RX ADMIN — HYDROXYZINE HYDROCHLORIDE 25 MG: 25 TABLET, FILM COATED ORAL at 21:20

## 2024-02-24 RX ADMIN — ALPRAZOLAM 0.5 MG: 0.5 TABLET ORAL at 06:34

## 2024-02-24 RX ADMIN — INSULIN LISPRO 2 UNITS: 100 INJECTION, SOLUTION INTRAVENOUS; SUBCUTANEOUS at 16:59

## 2024-02-24 RX ADMIN — Medication 3 ML: at 08:16

## 2024-02-24 RX ADMIN — INSULIN LISPRO 2 UNITS: 100 INJECTION, SOLUTION INTRAVENOUS; SUBCUTANEOUS at 11:38

## 2024-02-24 RX ADMIN — INSULIN LISPRO 2 UNITS: 100 INJECTION, SOLUTION INTRAVENOUS; SUBCUTANEOUS at 21:20

## 2024-02-24 RX ADMIN — SENNOSIDES AND DOCUSATE SODIUM 2 TABLET: 8.6; 5 TABLET ORAL at 21:20

## 2024-02-24 RX ADMIN — DICYCLOMINE HYDROCHLORIDE 20 MG: 10 CAPSULE ORAL at 06:34

## 2024-02-24 RX ADMIN — MELOXICAM 15 MG: 15 TABLET ORAL at 08:15

## 2024-02-24 RX ADMIN — OXYCODONE HYDROCHLORIDE 10 MG: 10 TABLET ORAL at 09:36

## 2024-02-24 RX ADMIN — INSULIN LISPRO 2 UNITS: 100 INJECTION, SOLUTION INTRAVENOUS; SUBCUTANEOUS at 08:13

## 2024-02-24 RX ADMIN — SODIUM CHLORIDE 100 ML/HR: 9 INJECTION, SOLUTION INTRAVENOUS at 06:34

## 2024-02-24 RX ADMIN — SENNOSIDES AND DOCUSATE SODIUM 2 TABLET: 8.6; 5 TABLET ORAL at 08:15

## 2024-02-24 NOTE — PROGRESS NOTES
[facility]    Acute pain service Inpatient Progress Note    Patient Name: Jeevan Cardoso  :  1960  MRN:  [id number]        Acute Pain  Service Inpatient Progress Note:    Analgesia:Good  Pain Score:5/10  LOC: alert and awake  Resp Status: room air  Cardiac: VS stable  Side Effects:None  Catheter Site:clean, dressing intact and dry  Cath type: peripheral nerve cath with ON Q  Volume: Paused.  Catheter Plan:Catheter to remain Insitu and Continue catheter infusion rate unchanged  Comments:   Pain pump continued to stay paused, PT to assess patient for quad weakness, with PT approval pain pump will be restarted.

## 2024-02-24 NOTE — PLAN OF CARE
"Assumed care at 19:00, Pt A/O, on 1 L NC, uses bedpan for elimination. Nerve block catheter in place and functioning. Pt has IV fluids running, Was febrile throughout the night, but AM temp was 99.9 F. No complaints of pain.  Pt requested more Bentyl in AM.  Left in bed at lowest position with call light in reach, bed alarm on and  at bedside.    /54 (BP Location: Right arm, Patient Position: Lying)   Pulse 103   Temp 99.9 °F (37.7 °C)   Resp 18   Ht 157.5 cm (62\")   Wt 81.6 kg (180 lb)   LMP  (LMP Unknown)   SpO2 93%   BMI 32.92 kg/m²     Problem: Adult Inpatient Plan of Care  Goal: Plan of Care Review  Outcome: Ongoing, Progressing  Flowsheets (Taken 2/24/2024 0705)  Progress: no change  Plan of Care Reviewed With: patient  Outcome Evaluation: Pt A/O, on 1 L NC, uses bedpan for elimination.  Nerve block catheter in place and functioning.  Pt has IV fluids running, Was febrile throughout the night, but AM temp was 99.9 F.  No complaints of pain  Goal: Patient-Specific Goal (Individualized)  Outcome: Ongoing, Progressing  Goal: Optimal Comfort and Wellbeing  Outcome: Ongoing, Progressing  Intervention: Provide Person-Centered Care  Recent Flowsheet Documentation  Taken 2/23/2024 2100 by Lani Haile RN  Trust Relationship/Rapport:   care explained   choices provided   empathic listening provided   questions answered   thoughts/feelings acknowledged  Goal: Readiness for Transition of Care  Outcome: Ongoing, Progressing     Problem: Pain Acute  Goal: Acceptable Pain Control and Functional Ability  Outcome: Ongoing, Progressing  Intervention: Prevent or Manage Pain  Recent Flowsheet Documentation  Taken 2/23/2024 2100 by Lani Haile, RN  Sleep/Rest Enhancement:   awakenings minimized   regular sleep/rest pattern promoted  Taken 2/23/2024 2000 by Lani Haile RN  Medication Review/Management: medications reviewed  Intervention: Optimize Psychosocial Wellbeing  Recent Flowsheet " Documentation  Taken 2/24/2024 0000 by Lani Haile RN  Diversional Activities: television  Taken 2/23/2024 2100 by Lani Haile RN  Supportive Measures: active listening utilized  Diversional Activities: television     Problem: Fall Injury Risk  Goal: Absence of Fall and Fall-Related Injury  Outcome: Ongoing, Progressing  Intervention: Identify and Manage Contributors  Recent Flowsheet Documentation  Taken 2/23/2024 2000 by Lani Haile RN  Medication Review/Management: medications reviewed  Self-Care Promotion:   independence encouraged   BADL personal objects within reach  Intervention: Promote Injury-Free Environment  Recent Flowsheet Documentation  Taken 2/24/2024 0000 by Lani Haile RN  Safety Promotion/Fall Prevention:   activity supervised   assistive device/personal items within reach   safety round/check completed  Taken 2/23/2024 2200 by Lani Haile RN  Safety Promotion/Fall Prevention:   activity supervised   assistive device/personal items within reach   safety round/check completed  Taken 2/23/2024 2000 by Lani Haile RN  Safety Promotion/Fall Prevention:   activity supervised   assistive device/personal items within reach   safety round/check completed     Problem: Adjustment to Surgery (Knee Arthroplasty)  Goal: Optimal Coping  Outcome: Ongoing, Progressing  Intervention: Support Psychosocial Response to Surgery and Mobility Changes  Recent Flowsheet Documentation  Taken 2/23/2024 2100 by Lani Haile RN  Supportive Measures: active listening utilized     Problem: Bowel Motility Impaired (Knee Arthroplasty)  Goal: Effective Bowel Elimination  Outcome: Ongoing, Progressing     Problem: Fluid and Electrolyte Imbalance (Knee Arthroplasty)  Goal: Fluid and Electrolyte Balance  Outcome: Ongoing, Progressing     Problem: Functional Ability Impaired (Knee Arthroplasty)  Goal: Optimal Functional Ability  Outcome: Ongoing, Progressing  Intervention: Promote Optimal  Functional Status  Recent Flowsheet Documentation  Taken 2/24/2024 0000 by Lani Haile RN  Activity Management: activity minimized  Taken 2/23/2024 2200 by Lani Haile RN  Activity Management: activity encouraged  Taken 2/23/2024 2000 by Lani Haile RN  Activity Management: activity encouraged  Self-Care Promotion:   independence encouraged   BADL personal objects within reach     Problem: Neurovascular Compromise (Knee Arthroplasty)  Goal: Intact Neurovascular Status  Outcome: Ongoing, Progressing     Problem: Ongoing Anesthesia Effects (Knee Arthroplasty)  Goal: Anesthesia/Sedation Recovery  Outcome: Ongoing, Progressing  Intervention: Optimize Anesthesia Recovery  Recent Flowsheet Documentation  Taken 2/24/2024 0000 by Lani Haile RN  Safety Promotion/Fall Prevention:   activity supervised   assistive device/personal items within reach   safety round/check completed  Taken 2/23/2024 2200 by Lani Haile RN  Safety Promotion/Fall Prevention:   activity supervised   assistive device/personal items within reach   safety round/check completed  Taken 2/23/2024 2000 by Lani Haile RN  Safety Promotion/Fall Prevention:   activity supervised   assistive device/personal items within reach   safety round/check completed     Problem: Pain (Knee Arthroplasty)  Goal: Acceptable Pain Control  Outcome: Ongoing, Progressing  Intervention: Prevent or Manage Pain  Recent Flowsheet Documentation  Taken 2/24/2024 0000 by Lani Haile RN  Diversional Activities: television  Taken 2/23/2024 2100 by Lani Haile RN  Diversional Activities: television     Problem: Postoperative Nausea and Vomiting (Knee Arthroplasty)  Goal: Nausea and Vomiting Relief  Outcome: Ongoing, Progressing     Problem: Postoperative Urinary Retention (Knee Arthroplasty)  Goal: Effective Urinary Elimination  Outcome: Ongoing, Progressing     Problem: Respiratory Compromise (Knee Arthroplasty)  Goal: Effective  Oxygenation and Ventilation  Outcome: Ongoing, Progressing  Intervention: Optimize Oxygenation and Ventilation  Recent Flowsheet Documentation  Taken 2/24/2024 0000 by Lani Haile RN  Head of Bed (HOB) Positioning: HOB elevated  Taken 2/23/2024 2200 by Lani Haile RN  Head of Bed (HOB) Positioning: HOB elevated  Taken 2/23/2024 2000 by Lani Haile RN  Head of Bed (HOB) Positioning: HOB elevated   Goal Outcome Evaluation:  Plan of Care Reviewed With: patient        Progress: no change  Outcome Evaluation: Pt A/O, on 1 L NC, uses bedpan for elimination.  Nerve block catheter in place and functioning.  Pt has IV fluids running, Was febrile throughout the night, but AM temp was 99.9 F.  No complaints of pain
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
## 2024-02-24 NOTE — PROGRESS NOTES
"Orthopedic Daily Progress Note      CC: Better today    Pain controlled  General: no fevers, chills  Abdomen: no nausea, vomiting, or diarrhea    No other complaints    Physical Exam:  I have reviewed the vital signs.  Temp:  [98.8 °F (37.1 °C)-101.5 °F (38.6 °C)] 100.6 °F (38.1 °C)  Heart Rate:  [] 103  Resp:  [18] 18  BP: (102-112)/(53-62) 102/53    Objective:  Vital signs: (most recent): Blood pressure 102/53, pulse 103, temperature (!) 100.6 °F (38.1 °C), temperature source Oral, resp. rate 18, height 157.5 cm (62\"), weight 81.6 kg (180 lb), SpO2 95%, not currently breastfeeding.              General Appearance:    Alert, cooperative, no distress  Extremities: No clubbing, cyanosis, or edema to lower extremities  Pulses:  2+ in distal surgical extremity  Skin: Dressing Clean/dry/intact      Results Review:    I have reviewed the labs, radiology results and diagnostic studies:yes    Results from last 7 days   Lab Units 02/23/24  0302   WBC 10*3/mm3 6.59   HEMOGLOBIN g/dL 11.3*   PLATELETS 10*3/mm3 72*     Results from last 7 days   Lab Units 02/23/24  0302   SODIUM mmol/L 137   POTASSIUM mmol/L 3.9   CO2 mmol/L 20.0*   CREATININE mg/dL 1.02*   GLUCOSE mg/dL 150*       I have reviewed the medications.    Assessment/Problem List  POD# 3 S/p Left TKA    Plan  PT/OT  Low grad fever improving  Rehab when available        Ron Gonzales MD  02/24/24  10:33 EST            "

## 2024-02-24 NOTE — PLAN OF CARE
Goal Outcome Evaluation:  Plan of Care Reviewed With: patient, spouse        Progress: no change  Outcome Evaluation: Patient continues to be limited by L quad function requiring modA for bed mobility and transfers. She was able to ambulate 4' modAx1+1 with FWW and chair follow, limited by fatigue and L quad weakness. HEP reviewed with good effort from patient, but requiring significant assist due to weakness. IPPT remains indicated to address current deficits. Recommend IPR. Recommend trialing KI for afternoon session.      Anticipated Discharge Disposition (PT): inpatient rehabilitation facility

## 2024-02-24 NOTE — PLAN OF CARE
Goal Outcome Evaluation:  Plan of Care Reviewed With: patient, spouse        Progress: improving  Outcome Evaluation: Trialed KI for afternoon session due to continued quad weakness and patient able to progress ambulation distance to 8' modAx1 still requiring significant assist for balance with mobility and also HEP completion. Continue to recommend D/C to IPR for best functional outcome.      Anticipated Discharge Disposition (PT): inpatient rehabilitation facility
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
## 2024-02-24 NOTE — PROGRESS NOTES
"IM progress note      Jeevan Cardoso  [id number]  1960     LOS: 1 day     Attending: Olaf Buck MD    Primary Care Provider: Carl Mcnamara MD      Chief Complaint/Reason for visit:  No chief complaint on file.      Subjective   24: Hypotension issues overnight after low blood pressure prevented patient from working with therapy yesterday.  Blood pressure improved today with IV fluids given overnight.  No nausea or vomiting or shortness of breath.  Patient participated with PT and OT and was recommended for inpatient rehab at this point.    24:  Feels better overall today.  Was able to receive her beta-blocker yesterday evening.  This helped her sinus tachycardia.  She had low-grade temperature better today.  Limited progress with PT.    24:  Doing fairly well.  No nausea or vomiting.  No dizziness.  Participated with PT yesterday afternoon.  No bowel movement yet.  Voiding better.    Objective        Visit Vitals  /53 (BP Location: Right arm, Patient Position: Lying)   Pulse 103   Temp (!) 100.6 °F (38.1 °C) (Oral)   Resp 18   Ht 157.5 cm (62\")   Wt 81.6 kg (180 lb)   LMP  (LMP Unknown)   SpO2 95%   BMI 32.92 kg/m²     Temp (24hrs), Av.1 °F (37.8 °C), Min:98.8 °F (37.1 °C), Max:101.5 °F (38.6 °C)      Intake/Output:    Intake/Output Summary (Last 24 hours) at 2024 0845  Last data filed at 2024 2100  Gross per 24 hour   Intake 720 ml   Output 200 ml   Net 520 ml        Physical Therapy: 2024        Signed         Goal Outcome Evaluation:  Plan of Care Reviewed With: patient, spouse  Progress: improving  Outcome Evaluation: Pt. continues to present below baseline function w/decreased strength and AROM of L knee affecting her ability to safely participate in functional mobility. She performed transfers and ambulated 6' w/front wheeled walker, mod assist of 2. Activity limited by weakness, fatigue. Pt. tolerated progression in ther-ex well. Continue IPPT POC to " progress as tolerated.        Anticipated Discharge Disposition (PT): inpatient rehabilitation facility                    Physical Exam:     General Appearance:    Alert, cooperative, in no acute distress   Head:    Normocephalic, without obvious abnormality, atraumatic    Lungs:     Normal effort, symmetric chest rise,  clear to      auscultation bilaterally              Heart:    Regular rhythm and normal rate, normal S1 and S2    Abdomen:     Normal bowel sounds, no masses, no organomegaly, soft        non-tender, non-distended, no guarding, no rebound                tenderness   Extremities: Clean dry intact dressing over knee.  Peripheral nerve block catheter present.  Intact flexion and dorsiflexion bilateral feet.  No clubbing, cyanosis or edema.  No deformities.    Pulses:   Pulses palpable and equal bilaterally   Skin:   No bleeding, bruising or rash          Results Review:     I reviewed the patient's new clinical results.   Results from last 7 days   Lab Units 02/23/24  0302 02/22/24  0615   WBC 10*3/mm3 6.59 3.78   HEMOGLOBIN g/dL 11.3* 9.8*   HEMATOCRIT % 34.0 30.1*   PLATELETS 10*3/mm3 72* 72*     Results from last 7 days   Lab Units 02/23/24  0302 02/22/24  0615 02/21/24  0939   SODIUM mmol/L 137 139  --    POTASSIUM mmol/L 3.9 4.0 3.4*   CHLORIDE mmol/L 105 108*  --    CO2 mmol/L 20.0* 22.0  --    BUN mg/dL 16 16  --    CREATININE mg/dL 1.02* 1.03*  --    CALCIUM mg/dL 7.5* 7.3*  --    GLUCOSE mg/dL 150* 169*  --      I reviewed the patient's new imaging including images and reports.    All medications reviewed.   aspirin, 81 mg, Oral, Q12H  insulin lispro, 2-7 Units, Subcutaneous, 4x Daily AC & at Bedtime  meloxicam, 15 mg, Oral, Daily  methocarbamol, 1,000 mg, Oral, Nightly  metoprolol succinate XL, 50 mg, Oral, Nightly  pantoprazole, 40 mg, Oral, Daily  senna-docusate sodium, 2 tablet, Oral, BID  sodium chloride, 3 mL, Intravenous, Q12H      albuterol sulfate HFA, 1 puff, Q4H PRN  ALPRAZolam,  0.5 mg, TID PRN  polyethylene glycol, 17 g, Daily PRN   And  bisacodyl, 5 mg, Daily PRN   And  bisacodyl, 10 mg, Daily PRN  dextrose, 25 g, Q15 Min PRN  dextrose, 15 g, Q15 Min PRN  dicyclomine, 20 mg, TID PRN  glucagon (human recombinant), 1 mg, Q15 Min PRN  HYDROmorphone, 0.5 mg, Q2H PRN   And  naloxone, 0.1 mg, Q5 Min PRN  hydrOXYzine, 25 mg, Q6H PRN  labetalol, 10 mg, Q4H PRN  methocarbamol, 500 mg, Nightly PRN  ondansetron ODT, 4 mg, Q6H PRN   Or  ondansetron, 4 mg, Q6H PRN  ondansetron, 4 mg, Q6H PRN  oxyCODONE, 10 mg, Q4H PRN  oxyCODONE, 5 mg, Q4H PRN  sodium chloride, 500 mL, TID PRN  sodium chloride, 3-10 mL, PRN        Assessment & Plan       Status post total left knee replacement    Restless legs syndrome (RLS)    Thrombocytopenia    Essential hypertension    Hx of gastroesophageal reflux (GERD)    Insomnia    Osteoarthritis    Obstructive sleep apnea on CPAP    Arthritis of knee    Status post knee replacement  Chronic pain.  Obesity.  Anxiety     Plan  1. PT/OT, weightbearing as tolerated left lower extremity  2. Pain control-prns, peripheral nerve block catheter, infuse pump.  Multimodal approach  3. IS-encouraged  4. DVT proph-mechanicals and aspirin   5. Bowel regimen  6. Monitor post-op labs  7. DC planning, inpatient rehab facility,  following.     Postop hypotension: Monitor closely after IV fluids.    Holding parameters for antihypertensive medications.  Holding diuretics including spironolactone and Lasix.  Consider resuming on 2/25/2020    -KLAUS:  Continue CPAP.   Monitor O2 sats.    -Anxiety: As needed alprazolam.    -Chronic pain: On Ultram regimen.  Hold while patient is on oxycodone.  Resume Robaxin as needed.    Discussed with patient and her , discussed with BISHOP.    Joe Castano MD  02/24/24  08:45 EST

## 2024-02-25 LAB
GLUCOSE BLDC GLUCOMTR-MCNC: 128 MG/DL (ref 70–130)
GLUCOSE BLDC GLUCOMTR-MCNC: 165 MG/DL (ref 70–130)
GLUCOSE BLDC GLUCOMTR-MCNC: 170 MG/DL (ref 70–130)
GLUCOSE BLDC GLUCOMTR-MCNC: 206 MG/DL (ref 70–130)

## 2024-02-25 PROCEDURE — 97530 THERAPEUTIC ACTIVITIES: CPT

## 2024-02-25 PROCEDURE — 97116 GAIT TRAINING THERAPY: CPT

## 2024-02-25 PROCEDURE — 82948 REAGENT STRIP/BLOOD GLUCOSE: CPT

## 2024-02-25 PROCEDURE — 97110 THERAPEUTIC EXERCISES: CPT

## 2024-02-25 PROCEDURE — 97535 SELF CARE MNGMENT TRAINING: CPT

## 2024-02-25 PROCEDURE — 63710000001 INSULIN LISPRO (HUMAN) PER 5 UNITS: Performed by: INTERNAL MEDICINE

## 2024-02-25 RX ORDER — SPIRONOLACTONE 25 MG/1
25 TABLET ORAL DAILY
Status: DISCONTINUED | OUTPATIENT
Start: 2024-02-26 | End: 2024-02-26 | Stop reason: HOSPADM

## 2024-02-25 RX ADMIN — ASPIRIN 81 MG: 81 TABLET, COATED ORAL at 08:33

## 2024-02-25 RX ADMIN — Medication 3 ML: at 20:59

## 2024-02-25 RX ADMIN — MELOXICAM 15 MG: 15 TABLET ORAL at 08:33

## 2024-02-25 RX ADMIN — OXYCODONE HYDROCHLORIDE 10 MG: 10 TABLET ORAL at 08:36

## 2024-02-25 RX ADMIN — HYDROXYZINE HYDROCHLORIDE 25 MG: 25 TABLET, FILM COATED ORAL at 15:50

## 2024-02-25 RX ADMIN — PANTOPRAZOLE SODIUM 40 MG: 40 TABLET, DELAYED RELEASE ORAL at 08:33

## 2024-02-25 RX ADMIN — INSULIN LISPRO 2 UNITS: 100 INJECTION, SOLUTION INTRAVENOUS; SUBCUTANEOUS at 17:00

## 2024-02-25 RX ADMIN — OXYCODONE HYDROCHLORIDE 10 MG: 10 TABLET ORAL at 13:29

## 2024-02-25 RX ADMIN — DICYCLOMINE HYDROCHLORIDE 10 MG: 10 CAPSULE ORAL at 20:51

## 2024-02-25 RX ADMIN — METHOCARBAMOL 1000 MG: 500 TABLET ORAL at 20:50

## 2024-02-25 RX ADMIN — INSULIN LISPRO 3 UNITS: 100 INJECTION, SOLUTION INTRAVENOUS; SUBCUTANEOUS at 12:10

## 2024-02-25 RX ADMIN — INSULIN LISPRO 2 UNITS: 100 INJECTION, SOLUTION INTRAVENOUS; SUBCUTANEOUS at 20:49

## 2024-02-25 RX ADMIN — OXYCODONE HYDROCHLORIDE 5 MG: 5 TABLET ORAL at 20:50

## 2024-02-25 RX ADMIN — ALPRAZOLAM 0.5 MG: 0.5 TABLET ORAL at 20:52

## 2024-02-25 RX ADMIN — SENNOSIDES AND DOCUSATE SODIUM 2 TABLET: 8.6; 5 TABLET ORAL at 20:51

## 2024-02-25 RX ADMIN — Medication 3 ML: at 08:33

## 2024-02-25 RX ADMIN — ASPIRIN 81 MG: 81 TABLET, COATED ORAL at 20:49

## 2024-02-25 NOTE — PLAN OF CARE
Goal Outcome Evaluation:  Plan of Care Reviewed With: patient, spouse        Progress: improving  Outcome Evaluation: Pt alert and participatory in OT interventions this date. Pt demonstrated ability to stand with mild gains in activity tolerance to allow for dependent clothing mgmt and toileting related hygiene pre and post voiding, respectively. Pt completed said standing and t/f at toileting after fxl mobility in room to BSC. Pt was fatigued and req'd cues for PLB. Pt otherwise req'd mod A for STS from BSC and t/f from BSC > bed and gross mod A x 2 for bed mobility. Pt is still limited by decreased activity tolerance, impaired balance, muscle weakness at BUEs and L quad muscle, fatigue with more dynamic demands. Plan for progressive AE training in future session to improve LBD. Cont to recommend IRF at d/c when medically ready.      Anticipated Discharge Disposition (OT): inpatient rehabilitation facility

## 2024-02-25 NOTE — PROGRESS NOTES
"Orthopedic Daily Progress Note      CC: Continues to improve    Pain nocontrolled  General: no fevers, chills  Abdomen: No nausea, vomiting, or diarrhea    No other complaints    Physical Exam:  I have reviewed the vital signs.  Temp:  [98.5 °F (36.9 °C)-100.8 °F (38.2 °C)] 99.1 °F (37.3 °C)  Heart Rate:  [102-111] 107  Resp:  [16-18] 16  BP: (103-117)/(46-57) 115/51    Objective:  Vital signs: (most recent): Blood pressure 115/51, pulse 107, temperature 99.1 °F (37.3 °C), temperature source Oral, resp. rate 16, height 157.5 cm (62\"), weight 81.6 kg (180 lb), SpO2 93%, not currently breastfeeding.              General Appearance:    Alert, cooperative, no distress  Extremities: No clubbing, cyanosis, or edema to lower extremities  Pulses:  2+ in distal surgical extremity  Skin: Dressing Clean/dry/intact      Results Review:    I have reviewed the labs, radiology results and diagnostic studies: Yes    Results from last 7 days   Lab Units 02/23/24  0302   WBC 10*3/mm3 6.59   HEMOGLOBIN g/dL 11.3*   PLATELETS 10*3/mm3 72*     Results from last 7 days   Lab Units 02/23/24  0302   SODIUM mmol/L 137   POTASSIUM mmol/L 3.9   CO2 mmol/L 20.0*   CREATININE mg/dL 1.02*   GLUCOSE mg/dL 150*       I have reviewed the medications.    Assessment/Problem List  POD# 4 s/p left TKA    Plan  PT/OT  Low grad fever improving  Rehab when available    Ron Gonzales MD  02/25/24  10:39 EST            "
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
## 2024-02-25 NOTE — PLAN OF CARE
Goal Outcome Evaluation:  Plan of Care Reviewed With: patient, spouse        Progress: improving  Outcome Evaluation: Patient able to progress ambulation distance to 15' with FWW minAx1+1 for chair follow. She is still requiring knee immobilizer for mobility and significant assist with HEP due to quad weakness. IPPT remains indicated to address deficits. Recommend D/C to IPR when medically ready.      Anticipated Discharge Disposition (PT): inpatient rehabilitation facility

## 2024-02-25 NOTE — PLAN OF CARE
Goal Outcome Evaluation:  Plan of Care Reviewed With: patient, spouse        Progress: improving  Outcome Evaluation: Patient continues to present with L quad weakness. She is showing improvement requiring less assist (Donna) for transfers today and able to ambulate 12' Donna with FWW and in knee immobilizer. Still requiring significant assist with HEP due to quad weakness, but giving good effort. Continue to recommend D/C to IPR.      Anticipated Discharge Disposition (PT): inpatient rehabilitation facility

## 2024-02-25 NOTE — PLAN OF CARE
"Assumed care at 19:00.  Pt awake in chair, A/O, on 1 L NC, up to BSC with assist x2, had a small BM and urinated. Complained of pain after movement but denied PRN medication. Slept most of the night still having low grade fevers. Normal for AM vitals. Complained of indigestion, but fell asleep before any treatment could be given. Remained asleep for the rest of the shift.  Left in bed at lowest position with bed alarm on and call light within reach.    /57 (BP Location: Right arm, Patient Position: Lying)   Pulse 109   Temp 98.5 °F (36.9 °C) (Oral)   Resp 18   Ht 157.5 cm (62\")   Wt 81.6 kg (180 lb)   LMP  (LMP Unknown)   SpO2 95%   BMI 32.92 kg/m²     Problem: Adult Inpatient Plan of Care  Goal: Plan of Care Review  Outcome: Ongoing, Progressing  Flowsheets (Taken 2/25/2024 0431)  Progress: no change  Plan of Care Reviewed With: patient  Outcome Evaluation: PT A/O, on 1 L NC, up to BSC with assist x2, had a small BM and urinated.  Complained of pain after movement but denied PRN medication.  Slept most of the night still having low grade fevers.  Complained of indigestion, but fell asleep before any treatment could be given.  Goal: Patient-Specific Goal (Individualized)  Outcome: Ongoing, Progressing  Goal: Optimal Comfort and Wellbeing  Outcome: Ongoing, Progressing  Intervention: Monitor Pain and Promote Comfort  Recent Flowsheet Documentation  Taken 2/24/2024 2100 by Lani Haile RN  Pain Management Interventions:   pillow support provided   position adjusted  Intervention: Provide Person-Centered Care  Recent Flowsheet Documentation  Taken 2/24/2024 2100 by Lani Haile RN  Trust Relationship/Rapport:   care explained   choices provided   empathic listening provided   questions answered   thoughts/feelings acknowledged  Goal: Readiness for Transition of Care  Outcome: Ongoing, Progressing     Problem: Pain Acute  Goal: Acceptable Pain Control and Functional Ability  Outcome: Ongoing, " Progressing  Intervention: Prevent or Manage Pain  Recent Flowsheet Documentation  Taken 2/24/2024 2100 by Lani Haile RN  Medication Review/Management: medications reviewed  Intervention: Develop Pain Management Plan  Recent Flowsheet Documentation  Taken 2/24/2024 2100 by Lani Haile RN  Pain Management Interventions:   pillow support provided   position adjusted  Intervention: Optimize Psychosocial Wellbeing  Recent Flowsheet Documentation  Taken 2/25/2024 0000 by Lani Haile RN  Diversional Activities: television  Taken 2/24/2024 2100 by Lani Haile RN  Supportive Measures: active listening utilized  Diversional Activities: television     Problem: Fall Injury Risk  Goal: Absence of Fall and Fall-Related Injury  Outcome: Ongoing, Progressing  Intervention: Identify and Manage Contributors  Recent Flowsheet Documentation  Taken 2/24/2024 2100 by Lani Haile RN  Medication Review/Management: medications reviewed  Intervention: Promote Injury-Free Environment  Recent Flowsheet Documentation  Taken 2/25/2024 0400 by Lani Haile RN  Safety Promotion/Fall Prevention:   activity supervised   assistive device/personal items within reach   safety round/check completed  Taken 2/25/2024 0200 by Lani Haile RN  Safety Promotion/Fall Prevention:   activity supervised   assistive device/personal items within reach   safety round/check completed  Taken 2/25/2024 0000 by Lani Haile RN  Safety Promotion/Fall Prevention:   activity supervised   assistive device/personal items within reach   safety round/check completed  Taken 2/24/2024 2200 by Lani Haile RN  Safety Promotion/Fall Prevention:   activity supervised   assistive device/personal items within reach   safety round/check completed  Taken 2/24/2024 2000 by Lani Haile RN  Safety Promotion/Fall Prevention:   activity supervised   assistive device/personal items within reach   safety round/check  completed     Problem: Adjustment to Surgery (Knee Arthroplasty)  Goal: Optimal Coping  Outcome: Ongoing, Progressing  Intervention: Support Psychosocial Response to Surgery and Mobility Changes  Recent Flowsheet Documentation  Taken 2/24/2024 2100 by Lani Haile RN  Supportive Measures: active listening utilized     Problem: Bowel Motility Impaired (Knee Arthroplasty)  Goal: Effective Bowel Elimination  Outcome: Ongoing, Progressing     Problem: Fluid and Electrolyte Imbalance (Knee Arthroplasty)  Goal: Fluid and Electrolyte Balance  Outcome: Ongoing, Progressing     Problem: Functional Ability Impaired (Knee Arthroplasty)  Goal: Optimal Functional Ability  Outcome: Ongoing, Progressing  Intervention: Promote Optimal Functional Status  Recent Flowsheet Documentation  Taken 2/25/2024 0400 by Lani Haile RN  Activity Management: activity minimized  Taken 2/25/2024 0200 by Lani Haile RN  Activity Management: activity minimized  Taken 2/25/2024 0000 by Lani Haile RN  Activity Management: activity minimized  Taken 2/24/2024 2200 by Lani Haile RN  Activity Management: activity encouraged  Taken 2/24/2024 2000 by Lani Haile RN  Activity Management:   activity encouraged   up in chair     Problem: Neurovascular Compromise (Knee Arthroplasty)  Goal: Intact Neurovascular Status  Outcome: Ongoing, Progressing     Problem: Ongoing Anesthesia Effects (Knee Arthroplasty)  Goal: Anesthesia/Sedation Recovery  Outcome: Ongoing, Progressing  Intervention: Optimize Anesthesia Recovery  Recent Flowsheet Documentation  Taken 2/25/2024 0400 by Lani Haile RN  Safety Promotion/Fall Prevention:   activity supervised   assistive device/personal items within reach   safety round/check completed  Taken 2/25/2024 0200 by Lani Haile RN  Safety Promotion/Fall Prevention:   activity supervised   assistive device/personal items within reach   safety round/check completed  Taken  2/25/2024 0000 by Lani Haile RN  Safety Promotion/Fall Prevention:   activity supervised   assistive device/personal items within reach   safety round/check completed  Taken 2/24/2024 2200 by Lani Haile RN  Safety Promotion/Fall Prevention:   activity supervised   assistive device/personal items within reach   safety round/check completed  Taken 2/24/2024 2000 by Lani Haile RN  Safety Promotion/Fall Prevention:   activity supervised   assistive device/personal items within reach   safety round/check completed     Problem: Pain (Knee Arthroplasty)  Goal: Acceptable Pain Control  Outcome: Ongoing, Progressing  Intervention: Prevent or Manage Pain  Recent Flowsheet Documentation  Taken 2/25/2024 0000 by Lani Haile RN  Diversional Activities: television  Taken 2/24/2024 2100 by Lani Haile RN  Pain Management Interventions:   pillow support provided   position adjusted  Diversional Activities: television     Problem: Postoperative Nausea and Vomiting (Knee Arthroplasty)  Goal: Nausea and Vomiting Relief  Outcome: Ongoing, Progressing     Problem: Postoperative Urinary Retention (Knee Arthroplasty)  Goal: Effective Urinary Elimination  Outcome: Ongoing, Progressing     Problem: Respiratory Compromise (Knee Arthroplasty)  Goal: Effective Oxygenation and Ventilation  Outcome: Ongoing, Progressing  Intervention: Optimize Oxygenation and Ventilation  Recent Flowsheet Documentation  Taken 2/25/2024 0400 by Lani Haile RN  Head of Bed (HOB) Positioning: HOB elevated  Taken 2/25/2024 0200 by Lani Haile RN  Head of Bed (HOB) Positioning: HOB elevated  Taken 2/25/2024 0000 by Lani Haile RN  Head of Bed (HOB) Positioning: HOB elevated  Taken 2/24/2024 2200 by Lani Haile RN  Head of Bed (HOB) Positioning: HOB elevated  Taken 2/24/2024 2000 by Lani Haile RN  Head of Bed (HOB) Positioning: HOB elevated     Problem: Skin Injury Risk Increased  Goal: Skin  Health and Integrity  Outcome: Ongoing, Progressing  Intervention: Optimize Skin Protection  Recent Flowsheet Documentation  Taken 2/25/2024 0400 by Lani Haile RN  Head of Bed (HOB) Positioning: HOB elevated  Taken 2/25/2024 0200 by Lani Haile RN  Head of Bed (HOB) Positioning: HOB elevated  Taken 2/25/2024 0000 by Lani Haile RN  Head of Bed (HOB) Positioning: HOB elevated  Taken 2/24/2024 2200 by Lani Haile RN  Head of Bed (HOB) Positioning: HOB elevated  Taken 2/24/2024 2000 by Lani Haile RN  Head of Bed (HOB) Positioning: HOB elevated   Goal Outcome Evaluation:  Plan of Care Reviewed With: patient        Progress: no change  Outcome Evaluation: PT A/O, on 1 L NC, up to BSC with assist x2, had a small BM and urinated.  Complained of pain after movement but denied PRN medication.  Slept most of the night still having low grade fevers.  Complained of indigestion, but fell asleep before any treatment could be given.

## 2024-02-26 VITALS
DIASTOLIC BLOOD PRESSURE: 48 MMHG | OXYGEN SATURATION: 91 % | TEMPERATURE: 98.7 F | HEIGHT: 62 IN | RESPIRATION RATE: 18 BRPM | SYSTOLIC BLOOD PRESSURE: 120 MMHG | BODY MASS INDEX: 33.13 KG/M2 | WEIGHT: 180 LBS | HEART RATE: 109 BPM

## 2024-02-26 LAB
GLUCOSE BLDC GLUCOMTR-MCNC: 157 MG/DL (ref 70–130)
GLUCOSE BLDC GLUCOMTR-MCNC: 162 MG/DL (ref 70–130)

## 2024-02-26 PROCEDURE — 63710000001 INSULIN LISPRO (HUMAN) PER 5 UNITS: Performed by: INTERNAL MEDICINE

## 2024-02-26 PROCEDURE — 97530 THERAPEUTIC ACTIVITIES: CPT

## 2024-02-26 PROCEDURE — 97110 THERAPEUTIC EXERCISES: CPT

## 2024-02-26 PROCEDURE — 99024 POSTOP FOLLOW-UP VISIT: CPT | Performed by: ORTHOPAEDIC SURGERY

## 2024-02-26 PROCEDURE — 97116 GAIT TRAINING THERAPY: CPT

## 2024-02-26 PROCEDURE — 82948 REAGENT STRIP/BLOOD GLUCOSE: CPT

## 2024-02-26 RX ORDER — POLYETHYLENE GLYCOL 3350 17 G/17G
17 POWDER, FOR SOLUTION ORAL DAILY PRN
Start: 2024-02-26

## 2024-02-26 RX ORDER — AMOXICILLIN 250 MG
2 CAPSULE ORAL 2 TIMES DAILY
Start: 2024-02-26

## 2024-02-26 RX ORDER — FUROSEMIDE 20 MG/1
20 TABLET ORAL AS NEEDED
Start: 2024-02-27

## 2024-02-26 RX ADMIN — Medication 3 ML: at 08:06

## 2024-02-26 RX ADMIN — PANTOPRAZOLE SODIUM 40 MG: 40 TABLET, DELAYED RELEASE ORAL at 08:05

## 2024-02-26 RX ADMIN — OXYCODONE HYDROCHLORIDE 10 MG: 10 TABLET ORAL at 12:43

## 2024-02-26 RX ADMIN — OXYCODONE HYDROCHLORIDE 10 MG: 10 TABLET ORAL at 08:05

## 2024-02-26 RX ADMIN — ASPIRIN 81 MG: 81 TABLET, COATED ORAL at 08:05

## 2024-02-26 RX ADMIN — MELOXICAM 15 MG: 15 TABLET ORAL at 08:05

## 2024-02-26 RX ADMIN — INSULIN LISPRO 2 UNITS: 100 INJECTION, SOLUTION INTRAVENOUS; SUBCUTANEOUS at 08:05

## 2024-02-26 NOTE — PROGRESS NOTES
[facility]    Acute pain service Inpatient Progress Note    Patient Name: Jeevan Cardoso  :  1960  MRN:  [id number]        Acute Pain  Service Inpatient Progress Note:    Analgesia:Good  Pain Score:3/10  LOC: alert and awake  Side Effects:None  Catheter Site:clean, dry and dressing intact  Cath type: peripheral nerve cath(InfuSystem)  Catheter Plan:Catheter removed and tip intact

## 2024-02-26 NOTE — DISCHARGE PLACEMENT REQUEST
"Noe Cardoso \"Neela\" (63 y.o. Female) From Pete Fang RN  [phone]      Date of Birth   1960    Social Security Number       Address   [address] [place] [address]    Home Phone   [phone]    MRN   [id number]       Muslim   Yazdanism    Marital Status                               Admission Date   24    Admission Type   Elective    Admitting Provider   Olaf Buck MD    Attending Provider   Olaf Buck MD    Department, Room/Bed   [facility] 3H, S368/1       Discharge Date       Discharge Disposition   Rehab Facility or Unit (DC - External)    Discharge Destination                                 Attending Provider: Olaf Buck MD    Allergies: Omnicef [Cefdinir], Penicillins, Vancomycin, Acetaminophen, Cefaclor, Hydrocodone, Oxycodone-acetaminophen, Pentazocine    Isolation: None   Infection: None   Code Status: CPR    Ht: 157.5 cm (62\")   Wt: 81.6 kg (180 lb)    Admission Cmt: None   Principal Problem: Status post total left knee replacement [Z96.652]                   Active Insurance as of 2024       Primary Coverage       Payor Plan Insurance Group Employer/Plan Group    AETNA MEDICARE REPLACEMENT AETNA MEDICARE REPLACEMENT [id number]       Payor Plan Address Payor Plan Phone Number Payor Plan Fax Number Effective Dates    [address] [phone]  10/1/2023 - None Entered    [facility] [id number]         Subscriber Name Subscriber Birth Date Member ID       NOE CARDOSO 1960 [id number]                     Emergency Contacts        (Rel.) Home Phone Work Phone Mobile Phone    Conrad Cardoso (Spouse) [phone]    DoVale soni (Daughter) [phone] -- [phone]                   Discharge Summary        Joe Castano MD at 24 1130          Patient Name: Noe Cardoso  MRN: [id number]  : 1960  DOS: 2024    Attending: " Olaf Buck MD    Primary Care Provider: Carl Mcnamara MD    Date of Admission:.2/21/2024  7:58 AM    Date of Discharge:  2/26/2024    Discharge Diagnosis:   Status post total left knee replacement    Restless legs syndrome (RLS)    Thrombocytopenia    Essential hypertension    Hx of gastroesophageal reflux (GERD)    Insomnia    Osteoarthritis    Obstructive sleep apnea on CPAP    Arthritis of knee  Postop hypotension, resolved    Hospital Course    At admit:  Patient is a pleasant 63 y.o. female presented for scheduled surgery by Dr. Buck.  She underwent left total knee arthroplasty under general anesthesia.  She tolerated surgery well and was admitted for further medical management.  He has been painful for many years.  She has been using a cane for ambulation due to instability.  She reports recent falls.     When seen in PACU she is doing well.  Her pain is well-controlled.  She denies nausea, shortness of breath or chest pain.  No history of DVT or PE.     After admit:    Patient was provided pain medications as needed for pain control, along with adductor canal nerve block infusion of Ropivacaine.      Adjustments were made to pain medications to optimize postop pain management. Risks and benefits of opiate medications discussed with patient. HECTOR report was reviewed.    She was seen by PT and OT and has progressed slowly over her stay.  Inpatient rehab was recommended to achieve best results.    Patient used an IS for atelectasis prophylaxis and aspirin along with mechanicals for DVT prophylaxis.    Home medications were resumed as appropriate, and labs were monitored and remained fairly stable.  Her diuretics and beta-blocker were placed on hold initially in the setting of postop hypotension.  Those were gradually resumed after her blood pressure stabilized and improved.  Initially received IV fluids, has been monitored off of IV fluids for the last 2 days now.    With the progress she has  "made, Ms. Cardoso is ready for DC to inpatient rehab today.      Peripheral nerve block catheter has since been removed    Discussed with patient regarding plan and she shows understanding and agreement.            Procedures Performed  Procedure(s):  TOTAL KNEE ARTHROPLASTY WITH PETER ROBOT - LEFT       Pertinent Test Results:    I reviewed the patient's new clinical results.   Results from last 7 days   Lab Units 24  0302 24  0615   WBC 10*3/mm3 6.59 3.78   HEMOGLOBIN g/dL 11.3* 9.8*   HEMATOCRIT % 34.0 30.1*   PLATELETS 10*3/mm3 72* 72*     Results from last 7 days   Lab Units 24  0302 24  0615 24  0939   SODIUM mmol/L 137 139  --    POTASSIUM mmol/L 3.9 4.0 3.4*   CHLORIDE mmol/L 105 108*  --    CO2 mmol/L 20.0* 22.0  --    BUN mg/dL 16 16  --    CREATININE mg/dL 1.02* 1.03*  --    CALCIUM mg/dL 7.5* 7.3*  --    GLUCOSE mg/dL 150* 169*  --      I reviewed the patient's new imaging including images and reports.   Latest Reference Range & Units 24 14:29   Hemoglobin A1C 4.80 - 5.60 % 4.30 (L)   (L): Data is abnormally low    Physical therapy  Lina Mann   Physical Therapist  Specialty: Physical Therapy     Plan of Care     Signed     Date of Service: 24  Creation Time: 24     Signed         Goal Outcome Evaluation:  Plan of Care Reviewed With: patient, spouse  Progress: improving  Outcome Evaluation: Pt with improved quad activation today, did not required KI, able to ambulate 15', FWW, CGA to min-A with chair follow, cues for stride length and stability.  continued recommendation IPR        Anticipated Discharge Disposition (PT): inpatient rehabilitation facility                 Discharge Assessment:       Visit Vitals  /48 (BP Location: Right arm, Patient Position: Sitting)   Pulse 109   Temp 98.7 °F (37.1 °C) (Oral)   Resp 18   Ht 157.5 cm (62\")   Wt 81.6 kg (180 lb)   LMP  (LMP Unknown)   SpO2 91%   BMI 32.92 kg/m²     Temp (24hrs), Av.9 " °F (37.2 °C), Min:98.4 °F (36.9 °C), Max:99.6 °F (37.6 °C)      General Appearance:    Alert, cooperative, in no acute distress   Lungs:     Clear to auscultation,respirations regular, even and                   unlabored    Heart:    Regular rhythm and normal rate, normal S1 and S2   Abdomen:     Normal bowel sounds, no masses, no organomegaly, soft        non-tender, non-distended, no guarding, no rebound                 tenderness   Extremities:   CDI dressing surgical knee Aquacel   Pulses:   Pulses palpable and equal bilaterally   Skin:   No bleeding, bruising or rash   Neurologic:   Cranial nerves 2 - 12 grossly intact, sensation intact, Flexion and dorsiflexion intact bilateral feet.         Discharge Disposition: [facility]          Discharge Medications        New Medications        Instructions Start Date   aspirin 81 MG EC tablet  Commonly known as: ASPIR   81 mg, Oral, 2 Times Daily      docusate sodium 100 MG capsule  Commonly known as: Colace   100 mg, Oral, 2 Times Daily      naloxone 4 MG/0.1ML nasal spray  Commonly known as: NARCAN   Call 911. Don't prime. [place] in 1 nostril for overdose. Repeat in 2-3 minutes in other nostril if no or minimal breathing/responsiveness.      oxyCODONE 5 MG immediate release tablet  Commonly known as: ROXICODONE   5 mg, Oral, Every 4 Hours PRN      polyethylene glycol 17 g packet  Commonly known as: MIRALAX   17 g, Oral, Daily PRN      sennosides-docusate 8.6-50 MG per tablet  Commonly known as: PERICOLACE   2 tablets, Oral, 2 Times Daily             Changes to Medications        Instructions Start Date   dicyclomine 10 MG capsule  Commonly known as: BENTYL  What changed:   how much to take  how to take this   Take 1 capsule three times daily as needed for abdominal pain      furosemide 20 MG tablet  Commonly known as: LASIX  What changed:   additional instructions  These instructions start on February 27, 2024. If you are unsure what to do until then, ask  your doctor or other care provider.   20 mg, Oral, As Needed, Resume if BP adequate ( over 115)   Start Date: February 27, 2024            Continue These Medications        Instructions Start Date   albuterol sulfate  (90 Base) MCG/ACT inhaler  Commonly known as: PROVENTIL HFA;VENTOLIN HFA;PROAIR HFA   Inhale 1 puff Every 4 (Four) Hours As Needed for Wheezing.      ALPRAZolam 0.5 MG tablet  Commonly known as: XANAX   0.5 mg, Oral, 3 Times Daily PRN      butalbital-acetaminophen-caffeine -40 MG per tablet  Commonly known as: FIORICET, ESGIC   1 tablet, Oral, As Needed      chlorpheniramine 4 MG tablet  Commonly known as: CHLOR-TRIMETON   4 mg, Oral, Every 6 Hours PRN      CoQ10 200 MG capsule   400 mg, Oral, Daily      diclofenac sodium 100 MG 24 hr tablet  Commonly known as: VOTAREN XR   100 mg, Oral, Daily      levocetirizine 5 MG tablet  Commonly known as: XYZAL   5 mg, Oral, As Needed      magnesium oxide 400 (241.3 Mg) MG tablet tablet  Commonly known as: MAGOX   400 mg, Oral, Daily      methocarbamol 500 MG tablet  Commonly known as: ROBAXIN   500 mg, Oral, As Needed, Takes 2-4 per day       metoprolol succinate XL 50 MG 24 hr tablet  Commonly known as: TOPROL-XL   50 mg, Oral, Daily      Mounjaro 7.5 MG/0.5ML solution pen-injector pen  Generic drug: Tirzepatide   Inject 0.5 mL under the skin into the appropriate area as directed 1 (One) Time Per Week. Monday      multivitamin with minerals tablet tablet   1 tablet, Oral, Daily      pantoprazole 40 MG EC tablet  Commonly known as: PROTONIX   40 mg, Oral, Daily      promethazine 25 MG tablet  Commonly known as: PHENERGAN   25 mg, Oral, As Needed      rizatriptan 10 MG tablet  Commonly known as: MAXALT   10 mg, Oral, Once As Needed, May repeat in 2 hours if needed      spironolactone 25 MG tablet  Commonly known as: ALDACTONE   25 mg, Oral, Daily      traMADol 50 MG tablet  Commonly known as: ULTRAM   50 mg, Oral, Nightly             Stop These  Medications      HYDROcodone-acetaminophen 5-325 MG per tablet  Commonly known as: NORCO              Discharge Diet: No concentrated sweets    Activity at Discharge: Protected weightbearing as tolerated left lower extremity       Future Appointments   Date Time Provider Department Center   3/12/2024  1:30 PM Milagros Cuello PA-C [facility]   4/3/2024 12:30 PM Shyanne Anand PA-C [facility]   Per Dr. Buck  (Dressing to remain in place for 7 days. May remove on POD#7. If no drainage, may shower on POD#10. No submerging wound in water. If drainage is noted, sterile dressing should be placed and wound checked daily. No showering until wound has remained dry for 72 consecutive hours.   Follow up in 3 weeks for re-assessment.).      Dragon disclaimer:  Part of this encounter note is an electronic transcription/translation of spoken language to printed text. The electronic translation of spoken language may permit erroneous, or at times, nonsensical words or phrases to be inadvertently transcribed; Although I have reviewed the note for such errors, some may still exist.       Joe Castano MD  02/26/24  11:30 EST              Electronically signed by Joe Castano MD at 02/26/24 1772

## 2024-02-26 NOTE — PROGRESS NOTES
"IM progress note      Jeevan Cardoso  [id number]  1960     LOS: 2 days     Attending: Olaf Buck MD    Primary Care Provider: Carl Mcnamara MD      Chief Complaint/Reason for visit:  No chief complaint on file.      Subjective   24: Hypotension issues overnight after low blood pressure prevented patient from working with therapy yesterday.  Blood pressure improved today with IV fluids given overnight.  No nausea or vomiting or shortness of breath.  Patient participated with PT and OT and was recommended for inpatient rehab at this point.    24:  Feels better overall today.  Was able to receive her beta-blocker yesterday evening.  This helped her sinus tachycardia.  She had low-grade temperature better today.  Limited progress with PT.    24:  Doing fairly well.  No nausea or vomiting.  No dizziness.  Participated with PT yesterday afternoon.  No bowel movement yet.  Voiding better.    24:  Feeling better.  Participating with PT and OT.  No dizziness.  No fever.    Objective        Visit Vitals  /58 (BP Location: Right arm, Patient Position: Lying)   Pulse 112   Temp 98.4 °F (36.9 °C) (Axillary)   Resp 16   Ht 157.5 cm (62\")   Wt 81.6 kg (180 lb)   LMP  (LMP Unknown)   SpO2 93%   BMI 32.92 kg/m²     Temp (24hrs), Av.6 °F (37 °C), Min:98.4 °F (36.9 °C), Max:99.1 °F (37.3 °C)      Intake/Output:    Intake/Output Summary (Last 24 hours) at 2024  Last data filed at 2024 1545  Gross per 24 hour   Intake 480 ml   Output 150 ml   Net 330 ml        Physical Therapy:    Delaney Nelson PT   Physical Therapist  Specialty: Physical Therapy     Plan of Care     Signed     Date of Service: 24 1456  Creation Time: 24 1600     Signed         Goal Outcome Evaluation:  Plan of Care Reviewed With: patient, spouse  Progress: improving  Outcome Evaluation: Patient able to progress ambulation distance to 15' with FWW minAx1+1 for chair follow. She is still " requiring knee immobilizer for mobility and significant assist with HEP due to quad weakness. IPPT remains indicated to address deficits. Recommend D/C to IPR when medically ready.        Anticipated Discharge Disposition (PT): inpatient rehabilitation facility                  Tammie Power, KRISTAN   Occupational Therapist  Specialty: Occupational Therapy     Plan of Care     Signed     Date of Service: 02/25/24 1528  Creation Time: 02/25/24 1615     Signed         Goal Outcome Evaluation:  Plan of Care Reviewed With: patient, spouse  Progress: improving  Outcome Evaluation: Pt alert and participatory in OT interventions this date. Pt demonstrated ability to stand with mild gains in activity tolerance to allow for dependent clothing mgmt and toileting related hygiene pre and post voiding, respectively. Pt completed said standing and t/f at toileting after fxl mobility in room to [facility] [place] [facility]. Pt was fatigued and req'd cues for PLB. Pt otherwise req'd mod A for STS from BSC and t/f from BSC > bed and gross mod A x 2 for bed mobility. Pt is still limited by decreased activity tolerance, impaired balance, muscle weakness at BUEs and L quad muscle, fatigue with more dynamic demands. Plan for progressive AE training in future session to improve LBD. Cont to recommend IRF at d/c when medically ready.        Anticipated Discharge Disposition (OT): inpatient rehabilitation facility                    Physical Exam:     General Appearance:    Alert, cooperative, in no acute distress   Head:    Normocephalic, without obvious abnormality, atraumatic    Lungs:     Normal effort, symmetric chest rise,  clear to      auscultation bilaterally              Heart:    Regular rhythm and normal rate, normal S1 and S2    Abdomen:     Normal bowel sounds, no masses, no organomegaly, soft        non-tender, non-distended, no guarding, no rebound                tenderness   Extremities: Clean dry intact dressing over knee.  Peripheral nerve block  catheter present.  Intact flexion and dorsiflexion bilateral feet.  No clubbing, cyanosis or edema.  No deformities.    Pulses:   Pulses palpable and equal bilaterally   Skin:   No bleeding, bruising or rash          Results Review:     I reviewed the patient's new clinical results.   Results from last 7 days   Lab Units 02/23/24  0302 02/22/24  0615   WBC 10*3/mm3 6.59 3.78   HEMOGLOBIN g/dL 11.3* 9.8*   HEMATOCRIT % 34.0 30.1*   PLATELETS 10*3/mm3 72* 72*     Results from last 7 days   Lab Units 02/23/24  0302 02/22/24  0615 02/21/24  0939   SODIUM mmol/L 137 139  --    POTASSIUM mmol/L 3.9 4.0 3.4*   CHLORIDE mmol/L 105 108*  --    CO2 mmol/L 20.0* 22.0  --    BUN mg/dL 16 16  --    CREATININE mg/dL 1.02* 1.03*  --    CALCIUM mg/dL 7.5* 7.3*  --    GLUCOSE mg/dL 150* 169*  --      I reviewed the patient's new imaging including images and reports.    All medications reviewed.   aspirin, 81 mg, Oral, Q12H  insulin lispro, 2-7 Units, Subcutaneous, 4x Daily AC & at Bedtime  meloxicam, 15 mg, Oral, Daily  methocarbamol, 1,000 mg, Oral, Nightly  metoprolol succinate XL, 50 mg, Oral, Nightly  pantoprazole, 40 mg, Oral, Daily  senna-docusate sodium, 2 tablet, Oral, BID  sodium chloride, 3 mL, Intravenous, Q12H      albuterol sulfate HFA, 1 puff, Q4H PRN  ALPRAZolam, 0.5 mg, TID PRN  polyethylene glycol, 17 g, Daily PRN   And  bisacodyl, 5 mg, Daily PRN   And  bisacodyl, 10 mg, Daily PRN  dextrose, 25 g, Q15 Min PRN  dextrose, 15 g, Q15 Min PRN  dicyclomine, 20 mg, TID PRN  glucagon (human recombinant), 1 mg, Q15 Min PRN  HYDROmorphone, 0.5 mg, Q2H PRN   And  naloxone, 0.1 mg, Q5 Min PRN  hydrOXYzine, 25 mg, Q6H PRN  labetalol, 10 mg, Q4H PRN  methocarbamol, 500 mg, Nightly PRN  ondansetron ODT, 4 mg, Q6H PRN   Or  ondansetron, 4 mg, Q6H PRN  ondansetron, 4 mg, Q6H PRN  oxyCODONE, 10 mg, Q4H PRN  oxyCODONE, 5 mg, Q4H PRN  sodium chloride, 500 mL, TID PRN  sodium chloride, 3-10 mL, PRN        Assessment & Plan       Status  post total left knee replacement    Restless legs syndrome (RLS)    Thrombocytopenia    Essential hypertension    Hx of gastroesophageal reflux (GERD)    Insomnia    Osteoarthritis    Obstructive sleep apnea on CPAP    Arthritis of knee    Status post knee replacement  Chronic pain.  Obesity.  Anxiety     Plan  1. PT/OT, weightbearing as tolerated left lower extremity  2. Pain control-prns, peripheral nerve block catheter, infuse pump.  Multimodal approach  3. IS-encouraged  4. DVT proph-mechanicals and aspirin   5. Bowel regimen  6. Monitor post-op labs  7. DC planning, inpatient rehab facility,  following.     Postop hypotension: Monitor closely after IV fluids.    Holding parameters for antihypertensive medications.    Discussed with patient.  Will resume Aldactone tomorrow 2/26/2024    -KLAUS:  Continue CPAP.   Monitor O2 sats.    -Anxiety: As needed alprazolam.    -Chronic pain: On Ultram regimen.  Hold while patient is on oxycodone.  Resume Robaxin as needed.    Discussed with patient and her , discussed with RN.    Joe Castano MD  02/25/24  21:23 EST

## 2024-02-26 NOTE — DISCHARGE SUMMARY
Patient Name: Jeevan Cardoso  MRN: [id number]  : 1960  DOS: 2024    Attending: Olaf Buck MD    Primary Care Provider: Carl Mcnamara MD    Date of Admission:.2024  7:58 AM    Date of Discharge:  2024    Discharge Diagnosis:   Status post total left knee replacement    Restless legs syndrome (RLS)    Thrombocytopenia    Essential hypertension    Hx of gastroesophageal reflux (GERD)    Insomnia    Osteoarthritis    Obstructive sleep apnea on CPAP    Arthritis of knee  Postop hypotension, resolved    Hospital Course    At admit:  Patient is a pleasant 63 y.o. female presented for scheduled surgery by Dr. Buck.  She underwent left total knee arthroplasty under general anesthesia.  She tolerated surgery well and was admitted for further medical management.  He has been painful for many years.  She has been using a cane for ambulation due to instability.  She reports recent falls.     When seen in PACU she is doing well.  Her pain is well-controlled.  She denies nausea, shortness of breath or chest pain.  No history of DVT or PE.     After admit:    Patient was provided pain medications as needed for pain control, along with adductor canal nerve block infusion of Ropivacaine.      Adjustments were made to pain medications to optimize postop pain management. Risks and benefits of opiate medications discussed with patient. HECTOR report was reviewed.    She was seen by PT and OT and has progressed slowly over her stay.  Inpatient rehab was recommended to achieve best results.    Patient used an IS for atelectasis prophylaxis and aspirin along with mechanicals for DVT prophylaxis.    Home medications were resumed as appropriate, and labs were monitored and remained fairly stable.  Her diuretics and beta-blocker were placed on hold initially in the setting of postop hypotension.  Those were gradually resumed after her blood pressure stabilized and improved.  Initially received IV fluids,  "has been monitored off of IV fluids for the last 2 days now.    With the progress she has made, Ms. Cardoso is ready for DC to inpatient rehab today.      Peripheral nerve block catheter has since been removed    Discussed with patient regarding plan and she shows understanding and agreement.            Procedures Performed  Procedure(s):  TOTAL KNEE ARTHROPLASTY WITH PETER ROBOT - LEFT       Pertinent Test Results:    I reviewed the patient's new clinical results.   Results from last 7 days   Lab Units 02/23/24  0302 02/22/24  0615   WBC 10*3/mm3 6.59 3.78   HEMOGLOBIN g/dL 11.3* 9.8*   HEMATOCRIT % 34.0 30.1*   PLATELETS 10*3/mm3 72* 72*     Results from last 7 days   Lab Units 02/23/24  0302 02/22/24  0615 02/21/24  0939   SODIUM mmol/L 137 139  --    POTASSIUM mmol/L 3.9 4.0 3.4*   CHLORIDE mmol/L 105 108*  --    CO2 mmol/L 20.0* 22.0  --    BUN mg/dL 16 16  --    CREATININE mg/dL 1.02* 1.03*  --    CALCIUM mg/dL 7.5* 7.3*  --    GLUCOSE mg/dL 150* 169*  --      I reviewed the patient's new imaging including images and reports.   Latest Reference Range & Units 02/07/24 14:29   Hemoglobin A1C 4.80 - 5.60 % 4.30 (L)   (L): Data is abnormally low    Physical therapy  Lina Mann   Physical Therapist  Specialty: Physical Therapy     Plan of Care     Signed     Date of Service: 02/26/24 0830  Creation Time: 02/26/24 0932     Signed         Goal Outcome Evaluation:  Plan of Care Reviewed With: patient, spouse  Progress: improving  Outcome Evaluation: Pt with improved quad activation today, did not required KI, able to ambulate 15', FWW, CGA to min-A with chair follow, cues for stride length and stability.  continued recommendation IPR        Anticipated Discharge Disposition (PT): inpatient rehabilitation facility                 Discharge Assessment:       Visit Vitals  /48 (BP Location: Right arm, Patient Position: Sitting)   Pulse 109   Temp 98.7 °F (37.1 °C) (Oral)   Resp 18   Ht 157.5 cm (62\")   Wt 81.6 " kg (180 lb)   LMP  (LMP Unknown)   SpO2 91%   BMI 32.92 kg/m²     Temp (24hrs), Av.9 °F (37.2 °C), Min:98.4 °F (36.9 °C), Max:99.6 °F (37.6 °C)      General Appearance:    Alert, cooperative, in no acute distress   Lungs:     Clear to auscultation,respirations regular, even and                   unlabored    Heart:    Regular rhythm and normal rate, normal S1 and S2   Abdomen:     Normal bowel sounds, no masses, no organomegaly, soft        non-tender, non-distended, no guarding, no rebound                 tenderness   Extremities:   CDI dressing surgical knee Aquacel   Pulses:   Pulses palpable and equal bilaterally   Skin:   No bleeding, bruising or rash   Neurologic:   Cranial nerves 2 - 12 grossly intact, sensation intact, Flexion and dorsiflexion intact bilateral feet.         Discharge Disposition: [facility]          Discharge Medications        New Medications        Instructions Start Date   aspirin 81 MG EC tablet  Commonly known as: ASPIR   81 mg, Oral, 2 Times Daily      docusate sodium 100 MG capsule  Commonly known as: Colace   100 mg, Oral, 2 Times Daily      naloxone 4 MG/0.1ML nasal spray  Commonly known as: NARCAN   Call 911. Don't prime. [place] in 1 nostril for overdose. Repeat in 2-3 minutes in other nostril if no or minimal breathing/responsiveness.      oxyCODONE 5 MG immediate release tablet  Commonly known as: ROXICODONE   5 mg, Oral, Every 4 Hours PRN      polyethylene glycol 17 g packet  Commonly known as: MIRALAX   17 g, Oral, Daily PRN      sennosides-docusate 8.6-50 MG per tablet  Commonly known as: PERICOLACE   2 tablets, Oral, 2 Times Daily             Changes to Medications        Instructions Start Date   dicyclomine 10 MG capsule  Commonly known as: BENTYL  What changed:   how much to take  how to take this   Take 1 capsule three times daily as needed for abdominal pain      furosemide 20 MG tablet  Commonly known as: LASIX  What changed:   additional  instructions  These instructions start on February 27, 2024. If you are unsure what to do until then, ask your doctor or other care provider.   20 mg, Oral, As Needed, Resume if BP adequate ( over 115)   Start Date: February 27, 2024            Continue These Medications        Instructions Start Date   albuterol sulfate  (90 Base) MCG/ACT inhaler  Commonly known as: PROVENTIL HFA;VENTOLIN HFA;PROAIR HFA   Inhale 1 puff Every 4 (Four) Hours As Needed for Wheezing.      ALPRAZolam 0.5 MG tablet  Commonly known as: XANAX   0.5 mg, Oral, 3 Times Daily PRN      butalbital-acetaminophen-caffeine -40 MG per tablet  Commonly known as: FIORICET, ESGIC   1 tablet, Oral, As Needed      chlorpheniramine 4 MG tablet  Commonly known as: CHLOR-TRIMETON   4 mg, Oral, Every 6 Hours PRN      CoQ10 200 MG capsule   400 mg, Oral, Daily      diclofenac sodium 100 MG 24 hr tablet  Commonly known as: VOTAREN XR   100 mg, Oral, Daily      levocetirizine 5 MG tablet  Commonly known as: XYZAL   5 mg, Oral, As Needed      magnesium oxide 400 (241.3 Mg) MG tablet tablet  Commonly known as: MAGOX   400 mg, Oral, Daily      methocarbamol 500 MG tablet  Commonly known as: ROBAXIN   500 mg, Oral, As Needed, Takes 2-4 per day       metoprolol succinate XL 50 MG 24 hr tablet  Commonly known as: TOPROL-XL   50 mg, Oral, Daily      Mounjaro 7.5 MG/0.5ML solution pen-injector pen  Generic drug: Tirzepatide   Inject 0.5 mL under the skin into the appropriate area as directed 1 (One) Time Per Week. Monday      multivitamin with minerals tablet tablet   1 tablet, Oral, Daily      pantoprazole 40 MG EC tablet  Commonly known as: PROTONIX   40 mg, Oral, Daily      promethazine 25 MG tablet  Commonly known as: PHENERGAN   25 mg, Oral, As Needed      rizatriptan 10 MG tablet  Commonly known as: MAXALT   10 mg, Oral, Once As Needed, May repeat in 2 hours if needed      spironolactone 25 MG tablet  Commonly known as: ALDACTONE   25 mg, Oral,  Daily      traMADol 50 MG tablet  Commonly known as: ULTRAM   50 mg, Oral, Nightly             Stop These Medications      HYDROcodone-acetaminophen 5-325 MG per tablet  Commonly known as: NORCO              Discharge Diet: No concentrated sweets    Activity at Discharge: Protected weightbearing as tolerated left lower extremity       Future Appointments   Date Time Provider Department Center   3/12/2024  1:30 PM Milagros Cuello PA-C [facility]   4/3/2024 12:30 PM Shyanne Anand PA-C [facility]   Per Dr. Buck  (Dressing to remain in place for 7 days. May remove on POD#7. If no drainage, may shower on POD#10. No submerging wound in water. If drainage is noted, sterile dressing should be placed and wound checked daily. No showering until wound has remained dry for 72 consecutive hours.   Follow up in 3 weeks for re-assessment.).      Dragon disclaimer:  Part of this encounter note is an electronic transcription/translation of spoken language to printed text. The electronic translation of spoken language may permit erroneous, or at times, nonsensical words or phrases to be inadvertently transcribed; Although I have reviewed the note for such errors, some may still exist.       Joe Castano MD  02/26/24  11:30 EST

## 2024-02-26 NOTE — PLAN OF CARE
"  Assumed care at 19:00.  Pt awake in bed, A/O, on RA. 2L NC added at 1am due to oxygen saturation in the 80s. Nerve block to L leg. Running at 1ml/hr, with 8 ml bolus doses. Pt complained of pain in leg. Releived by PRN medication. Slept most of the night. No acute events.  [place] [facility] [place]. Tolerated transfer well.  Left pt in bed at lowest position with bed alarm on and call light within reach.    /51 (BP Location: Right arm, Patient Position: Lying)   Pulse 115   Temp 99.6 °F (37.6 °C) (Oral)   Resp 16   Ht 157.5 cm (62\")   Wt 81.6 kg (180 lb)   LMP  (LMP Unknown)   SpO2 94%   BMI 32.92 kg/m²         Problem: Adult Inpatient Plan of Care  Goal: Plan of Care Review  Outcome: Ongoing, Progressing  Flowsheets (Taken 2/26/2024 0338)  Progress: improving  Plan of Care Reviewed With: patient  Outcome Evaluation: Pt A/O, on RA. 2L NC added at 1am due to oxygen saturation in the 80s. Nerve block to L leg.  Running at 1ml/hr, with bolus doses. Pt complained of pain in leg.  Releived by PRN medication.  Slept most of the night.  No acute events  Goal: Patient-Specific Goal (Individualized)  Outcome: Ongoing, Progressing  Goal: Optimal Comfort and Wellbeing  Outcome: Ongoing, Progressing  Intervention: Provide Person-Centered Care  Recent Flowsheet Documentation  Taken 2/25/2024 2050 by Lani Hiale RN  Trust Relationship/Rapport:   care explained   choices provided   empathic listening provided   questions answered   thoughts/feelings acknowledged  Goal: Readiness for Transition of Care  Outcome: Ongoing, Progressing     Problem: Pain Acute  Goal: Acceptable Pain Control and Functional Ability  Outcome: Ongoing, Progressing  Intervention: Prevent or Manage Pain  Recent Flowsheet Documentation  Taken 2/26/2024 0000 by Lani Haile, RN  Medication Review/Management: medications reviewed  Taken 2/25/2024 2200 by Lani Haile, RN  Medication Review/Management: medications " reviewed  Taken 2/25/2024 2050 by Lani Haile RN  Sleep/Rest Enhancement:   awakenings minimized   regular sleep/rest pattern promoted  Intervention: Optimize Psychosocial Wellbeing  Recent Flowsheet Documentation  Taken 2/26/2024 0000 by Lani Haile RN  Diversional Activities: television  Taken 2/25/2024 2050 by Lani Haile RN  Diversional Activities: television     Problem: Fall Injury Risk  Goal: Absence of Fall and Fall-Related Injury  Outcome: Ongoing, Progressing  Intervention: Identify and Manage Contributors  Recent Flowsheet Documentation  Taken 2/26/2024 0200 by Lani Haile RN  Self-Care Promotion:   independence encouraged   BADL personal objects within reach  Taken 2/26/2024 0000 by Lani Haile RN  Medication Review/Management: medications reviewed  Taken 2/25/2024 2200 by Lani Haile RN  Medication Review/Management: medications reviewed  Self-Care Promotion:   independence encouraged   BADL personal objects within reach  Taken 2/25/2024 2050 by Lani Haile RN  Self-Care Promotion:   independence encouraged   BADL personal objects within reach  Intervention: Promote Injury-Free Environment  Recent Flowsheet Documentation  Taken 2/26/2024 0200 by Lani Haile RN  Safety Promotion/Fall Prevention:   activity supervised   assistive device/personal items within reach   safety round/check completed  Taken 2/26/2024 0000 by Lani Haile RN  Safety Promotion/Fall Prevention:   activity supervised   assistive device/personal items within reach   safety round/check completed  Taken 2/25/2024 2200 by Lani Haile RN  Safety Promotion/Fall Prevention:   activity supervised   assistive device/personal items within reach   safety round/check completed  Taken 2/25/2024 2050 by Lani Haile RN  Safety Promotion/Fall Prevention:   activity supervised   assistive device/personal items within reach   safety round/check completed     Problem:  Adjustment to Surgery (Knee Arthroplasty)  Goal: Optimal Coping  Outcome: Ongoing, Progressing     Problem: Bowel Motility Impaired (Knee Arthroplasty)  Goal: Effective Bowel Elimination  Outcome: Ongoing, Progressing     Problem: Fluid and Electrolyte Imbalance (Knee Arthroplasty)  Goal: Fluid and Electrolyte Balance  Outcome: Ongoing, Progressing     Problem: Functional Ability Impaired (Knee Arthroplasty)  Goal: Optimal Functional Ability  Outcome: Ongoing, Progressing  Intervention: Promote Optimal Functional Status  Recent Flowsheet Documentation  Taken 2/26/2024 0200 by Lani Haile RN  Activity Management: activity minimized  Self-Care Promotion:   independence encouraged   BADL personal objects within reach  Taken 2/26/2024 0000 by Lani Haile RN  Activity Management: activity minimized  Taken 2/25/2024 2200 by Lani Haile RN  Activity Management: activity minimized  Self-Care Promotion:   independence encouraged   BADL personal objects within reach  Taken 2/25/2024 2050 by Lani Haile RN  Activity Management: activity encouraged  Self-Care Promotion:   independence encouraged   BADL personal objects within reach     Problem: Neurovascular Compromise (Knee Arthroplasty)  Goal: Intact Neurovascular Status  Outcome: Ongoing, Progressing     Problem: Ongoing Anesthesia Effects (Knee Arthroplasty)  Goal: Anesthesia/Sedation Recovery  Outcome: Ongoing, Progressing  Intervention: Optimize Anesthesia Recovery  Recent Flowsheet Documentation  Taken 2/26/2024 0200 by Lani Haile RN  Safety Promotion/Fall Prevention:   activity supervised   assistive device/personal items within reach   safety round/check completed  Taken 2/26/2024 0000 by Lani Haile RN  Safety Promotion/Fall Prevention:   activity supervised   assistive device/personal items within reach   safety round/check completed  Taken 2/25/2024 2200 by Lani Haile RN  Safety Promotion/Fall Prevention:   activity  supervised   assistive device/personal items within reach   safety round/check completed  Taken 2/25/2024 2050 by Lani Haile RN  Safety Promotion/Fall Prevention:   activity supervised   assistive device/personal items within reach   safety round/check completed     Problem: Pain (Knee Arthroplasty)  Goal: Acceptable Pain Control  Outcome: Ongoing, Progressing  Intervention: Prevent or Manage Pain  Recent Flowsheet Documentation  Taken 2/26/2024 0000 by Lani Haile RN  Diversional Activities: television  Taken 2/25/2024 2050 by Lani Haile RN  Diversional Activities: television     Problem: Postoperative Nausea and Vomiting (Knee Arthroplasty)  Goal: Nausea and Vomiting Relief  Outcome: Ongoing, Progressing     Problem: Postoperative Urinary Retention (Knee Arthroplasty)  Goal: Effective Urinary Elimination  Outcome: Ongoing, Progressing     Problem: Respiratory Compromise (Knee Arthroplasty)  Goal: Effective Oxygenation and Ventilation  Outcome: Ongoing, Progressing  Intervention: Optimize Oxygenation and Ventilation  Recent Flowsheet Documentation  Taken 2/26/2024 0200 by Lani Haile RN  Head of Bed (HOB) Positioning: HOB elevated  Taken 2/26/2024 0000 by Lani Haile RN  Head of Bed (HOB) Positioning: HOB elevated  Taken 2/25/2024 2200 by Lani Haile RN  Head of Bed (HOB) Positioning: HOB elevated  Taken 2/25/2024 2050 by Lani Haile RN  Head of Bed (HOB) Positioning: HOB elevated     Problem: Skin Injury Risk Increased  Goal: Skin Health and Integrity  Outcome: Ongoing, Progressing  Intervention: Optimize Skin Protection  Recent Flowsheet Documentation  Taken 2/26/2024 0200 by Lani Haile RN  Head of Bed (HOB) Positioning: HOB elevated  Taken 2/26/2024 0000 by Lani Haile RN  Head of Bed (HOB) Positioning: HOB elevated  Taken 2/25/2024 2200 by Lani Haile RN  Head of Bed (HOB) Positioning: HOB elevated  Taken 2/25/2024 2050 by Lazara  BISHOP Garibay  Pressure Reduction Techniques: frequent weight shift encouraged  Head of Bed (HOB) Positioning: HOB elevated  Pressure Reduction Devices: pressure-redistributing mattress utilized  Skin Protection:   adhesive use limited   incontinence pads utilized   tubing/devices free from skin contact   Goal Outcome Evaluation:  Plan of Care Reviewed With: patient        Progress: improving  Outcome Evaluation: Pt A/O, on RA. 2L NC added at 1am due to oxygen saturation in the 80s. Nerve block to L leg.  Running at 1ml/hr, with bolus doses. Pt complained of pain in leg.  Releived by PRN medication.  Slept most of the night.  No acute events
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
## 2024-02-26 NOTE — PROGRESS NOTES
"  SUBJECTIVE  Patient continues to complain of weakness with mobilization.  Pain moderately well-controlled.  No events overnight.    PHYSICAL THERAPY PROGRESS  Outcome Evaluation: Patient able to progress ambulation distance to 15' with FWW minAx1+1 for chair follow. She is still requiring knee immobilizer for mobility and significant assist with HEP due to quad weakness. IPPT remains indicated to address deficits. Recommend D/C to IPR when medically ready. (24 6645)     OBJECTIVE  Temp (24hrs), Av.9 °F (37.2 °C), Min:98.4 °F (36.9 °C), Max:99.6 °F (37.6 °C)    Blood pressure 116/51, pulse 115, temperature 99.6 °F (37.6 °C), temperature source Oral, resp. rate 16, height 157.5 cm (62\"), weight 81.6 kg (180 lb), SpO2 94%, not currently breastfeeding.    Lab Results (last 24 hours)       Procedure Component Value Units Date/Time    POC Glucose Once [[id number]]  (Abnormal) Collected: 24    Specimen: Blood Updated: 24 204     Glucose 165 mg/dL     POC Glucose Once [[id number]]  (Abnormal) Collected: 24 1652    Specimen: Blood Updated: 24 1654     Glucose 170 mg/dL     POC Glucose Once [[id number]]  (Abnormal) Collected: 24 1153    Specimen: Blood Updated: 24 1204     Glucose 206 mg/dL     POC Glucose Once [[id number]]  (Normal) Collected: 24 0708    Specimen: Blood Updated: 24 0729     Glucose 128 mg/dL               PHYSICAL EXAM  Left lower extremity: Dressing clean, dry and intact.  Able to perform straight leg raise with significant effort.  Intact EHL, FHL, tibialis anterior, and gastrocsoleus. Sensation intact to light touch to deep peroneal, superficial peroneal, sural, saphenous, tibial nerves. 2+ palpable DP and PT pulses.         Status post total left knee replacement    Restless legs syndrome (RLS)    Thrombocytopenia    Essential hypertension    Hx of gastroesophageal reflux (GERD)    Insomnia    Osteoarthritis    Obstructive sleep apnea on " CPAP    Arthritis of knee    Status post knee replacement      PLAN / DISPOSITION:  5 Days Post-Op left total knee arthroplasty    Protected weight bearing as tolerated left lower extremity, knee range of motion as tolerated  Encourage incentive spirometry and mobilization out of bed.  Pain control -D/C adductor canal catheter today  PT/OT for post op mobilization and medical equipment needs   SCD's bilateral lower extremities   Aspirin for DVT prophylaxis   Social work for discharge planning.  Anticipate discharge to rehab when bed available.    Dressing to remain in place for 7 days. May remove on POD#7. If no drainage, may shower on POD#10. No submerging wound in water. If drainage is noted, sterile dressing should be placed and wound checked daily. No showering until wound has remained dry for 72 consecutive hours.   Follow up in 3 weeks for re-assessment.      Future Appointments   Date Time Provider Department Center   3/12/2024  1:30 PM Milagros Cuello PA-C [facility]   4/3/2024 12:30 PM Shyanne Anand PA-C [facility]       Olaf Buck MD  02/26/24  07:03 EST

## 2024-02-26 NOTE — CASE MANAGEMENT/SOCIAL WORK
Case Management Discharge Note      Final Note: I was contacted by Velia peace w/[facility] regarding insurance approval for skilled bed today. RN to call report to [phone], I have faxed d/c summary to [phone]. I have arranged [facility] stretcher transport for today @ 1315pm. PCS sent to dropbox. I spoke w/patient and spouse in room, they are agreeable to d/c plan, no other d/c needs verbalized. I chat messaged RN, and Dr. PORTER, patient is medically ready for transport today.         Selected Continued Care - Admitted Since 2/21/2024       Destination Coordination complete.      Service Provider Selected Services Address Phone Fax Patient Preferred    [facility] [address] [address] [phone] --              Durable Medical Equipment    No services have been selected for the patient.                Dialysis/Infusion    No services have been selected for the patient.                Home Medical Care    No services have been selected for the patient.                Therapy       Service Provider Selected Services Address Phone Fax Patient Preferred    [facility] [address] [address] [phone] --              Community Resources    No services have been selected for the patient.                Community & DME    No services have been selected for the patient.                         Final Discharge Disposition Code: 03 - skilled nursing facility (SNF)

## 2024-02-26 NOTE — PLAN OF CARE
Goal Outcome Evaluation:  Plan of Care Reviewed With: patient, spouse        Progress: improving  Outcome Evaluation: Pt with improved quad activation today, did not required KI, able to ambulate 15', FWW, CGA to min-A with chair follow, cues for stride length and stability.  continued recommendation IPR      Anticipated Discharge Disposition (PT): inpatient rehabilitation facility

## 2024-02-26 NOTE — PAYOR COMM NOTE
"Marcella Jackson RN  Utilization Management  P:[phone]  F:[phone]    Auth# [id number]     Noe Cardoso \"Neela\" (63 y.o. Female)       Date of Birth   1960    Social Security Number       Address   [address] [place] [address]    Home Phone   [phone]    MRN   [id number]       Alevism   Gnosticist    Marital Status                               Admission Date   24    Admission Type   Elective    Admitting Provider   Olaf Buck MD    Attending Provider       Department, Room/Bed   [address], S368/1       Discharge Date   2024    Discharge Disposition   Rehab Facility or Unit (DC - External)    Discharge Destination                                 Attending Provider: (none)   Allergies: Omnicef [Cefdinir], Penicillins, Vancomycin, Acetaminophen, Cefaclor, Hydrocodone, Oxycodone-acetaminophen, Pentazocine    Isolation: None   Infection: None   Code Status: CPR    Ht: 157.5 cm (62\")   Wt: 81.6 kg (180 lb)    Admission Cmt: None   Principal Problem: Status post total left knee replacement [Z96.652]                   Active Insurance as of 2024       Primary Coverage       Payor Plan Insurance Group Employer/Plan Group    AETNA MEDICARE REPLACEMENT AETNA MEDICARE REPLACEMENT [id number]       Payor Plan Address Payor Plan Phone Number Payor Plan Fax Number Effective Dates    [address] [phone]  10/1/2023 - None Entered    [facility] [address]         Subscriber Name Subscriber Birth Date Member ID       NOE CARDOSO 1960 [id number]                     Emergency Contacts        (Rel.) Home Phone Work Phone Mobile Phone    Conrad Cardoso (Spouse) [phone]    Vale Muro (Daughter) [phone] -- [phone]                 Discharge Summary        Joe Castano MD at 24 1130          Patient Name: Noe Cardoso  MRN: [id number]  : 1960  DOS: " 2/26/2024    Attending: Olaf Buck MD    Primary Care Provider: Carl Mcnamara MD    Date of Admission:.2/21/2024  7:58 AM    Date of Discharge:  2/26/2024    Discharge Diagnosis:   Status post total left knee replacement    Restless legs syndrome (RLS)    Thrombocytopenia    Essential hypertension    Hx of gastroesophageal reflux (GERD)    Insomnia    Osteoarthritis    Obstructive sleep apnea on CPAP    Arthritis of knee  Postop hypotension, resolved    Hospital Course    At admit:  Patient is a pleasant 63 y.o. female presented for scheduled surgery by Dr. Buck.  She underwent left total knee arthroplasty under general anesthesia.  She tolerated surgery well and was admitted for further medical management.  He has been painful for many years.  She has been using a cane for ambulation due to instability.  She reports recent falls.     When seen in PACU she is doing well.  Her pain is well-controlled.  She denies nausea, shortness of breath or chest pain.  No history of DVT or PE.     After admit:    Patient was provided pain medications as needed for pain control, along with adductor canal nerve block infusion of Ropivacaine.      Adjustments were made to pain medications to optimize postop pain management. Risks and benefits of opiate medications discussed with patient. HECTOR report was reviewed.    She was seen by PT and OT and has progressed slowly over her stay.  Inpatient rehab was recommended to achieve best results.    Patient used an IS for atelectasis prophylaxis and aspirin along with mechanicals for DVT prophylaxis.    Home medications were resumed as appropriate, and labs were monitored and remained fairly stable.  Her diuretics and beta-blocker were placed on hold initially in the setting of postop hypotension.  Those were gradually resumed after her blood pressure stabilized and improved.  Initially received IV fluids, has been monitored off of IV fluids for the last 2 days now.    With  "the progress she has made, Ms. Cardoso is ready for DC to inpatient rehab today.      Peripheral nerve block catheter has since been removed    Discussed with patient regarding plan and she shows understanding and agreement.            Procedures Performed  Procedure(s):  TOTAL KNEE ARTHROPLASTY WITH PETER ROBOT - LEFT       Pertinent Test Results:    I reviewed the patient's new clinical results.   Results from last 7 days   Lab Units 02/23/24  0302 02/22/24  0615   WBC 10*3/mm3 6.59 3.78   HEMOGLOBIN g/dL 11.3* 9.8*   HEMATOCRIT % 34.0 30.1*   PLATELETS 10*3/mm3 72* 72*     Results from last 7 days   Lab Units 02/23/24  0302 02/22/24  0615 02/21/24  0939   SODIUM mmol/L 137 139  --    POTASSIUM mmol/L 3.9 4.0 3.4*   CHLORIDE mmol/L 105 108*  --    CO2 mmol/L 20.0* 22.0  --    BUN mg/dL 16 16  --    CREATININE mg/dL 1.02* 1.03*  --    CALCIUM mg/dL 7.5* 7.3*  --    GLUCOSE mg/dL 150* 169*  --      I reviewed the patient's new imaging including images and reports.   Latest Reference Range & Units 02/07/24 14:29   Hemoglobin A1C 4.80 - 5.60 % 4.30 (L)   (L): Data is abnormally low    Physical therapy  Lina Mann   Physical Therapist  Specialty: Physical Therapy     Plan of Care     Signed     Date of Service: 02/26/24 0830  Creation Time: 02/26/24 0932     Signed         Goal Outcome Evaluation:  Plan of Care Reviewed With: patient, spouse  Progress: improving  Outcome Evaluation: Pt with improved quad activation today, did not required KI, able to ambulate 15', FWW, CGA to min-A with chair follow, cues for stride length and stability.  continued recommendation IPR        Anticipated Discharge Disposition (PT): inpatient rehabilitation facility                 Discharge Assessment:       Visit Vitals  /48 (BP Location: Right arm, Patient Position: Sitting)   Pulse 109   Temp 98.7 °F (37.1 °C) (Oral)   Resp 18   Ht 157.5 cm (62\")   Wt 81.6 kg (180 lb)   LMP  (LMP Unknown)   SpO2 91%   BMI 32.92 kg/m² "     Temp (24hrs), Av.9 °F (37.2 °C), Min:98.4 °F (36.9 °C), Max:99.6 °F (37.6 °C)      General Appearance:    Alert, cooperative, in no acute distress   Lungs:     Clear to auscultation,respirations regular, even and                   unlabored    Heart:    Regular rhythm and normal rate, normal S1 and S2   Abdomen:     Normal bowel sounds, no masses, no organomegaly, soft        non-tender, non-distended, no guarding, no rebound                 tenderness   Extremities:   CDI dressing surgical knee Aquacel   Pulses:   Pulses palpable and equal bilaterally   Skin:   No bleeding, bruising or rash   Neurologic:   Cranial nerves 2 - 12 grossly intact, sensation intact, Flexion and dorsiflexion intact bilateral feet.         Discharge Disposition: [facility]          Discharge Medications        New Medications        Instructions Start Date   aspirin 81 MG EC tablet  Commonly known as: ASPIR   81 mg, Oral, 2 Times Daily      docusate sodium 100 MG capsule  Commonly known as: Colace   100 mg, Oral, 2 Times Daily      naloxone 4 MG/0.1ML nasal spray  Commonly known as: NARCAN   Call 911. Don't prime. [place] in 1 nostril for overdose. Repeat in 2-3 minutes in other nostril if no or minimal breathing/responsiveness.      oxyCODONE 5 MG immediate release tablet  Commonly known as: ROXICODONE   5 mg, Oral, Every 4 Hours PRN      polyethylene glycol 17 g packet  Commonly known as: MIRALAX   17 g, Oral, Daily PRN      sennosides-docusate 8.6-50 MG per tablet  Commonly known as: PERICOLACE   2 tablets, Oral, 2 Times Daily             Changes to Medications        Instructions Start Date   dicyclomine 10 MG capsule  Commonly known as: BENTYL  What changed:   how much to take  how to take this   Take 1 capsule three times daily as needed for abdominal pain      furosemide 20 MG tablet  Commonly known as: LASIX  What changed:   additional instructions  These instructions start on 2024. If you are unsure  what to do until then, ask your doctor or other care provider.   20 mg, Oral, As Needed, Resume if BP adequate ( over 115)   Start Date: February 27, 2024            Continue These Medications        Instructions Start Date   albuterol sulfate  (90 Base) MCG/ACT inhaler  Commonly known as: PROVENTIL HFA;VENTOLIN HFA;PROAIR HFA   Inhale 1 puff Every 4 (Four) Hours As Needed for Wheezing.      ALPRAZolam 0.5 MG tablet  Commonly known as: XANAX   0.5 mg, Oral, 3 Times Daily PRN      butalbital-acetaminophen-caffeine -40 MG per tablet  Commonly known as: FIORICET, ESGIC   1 tablet, Oral, As Needed      chlorpheniramine 4 MG tablet  Commonly known as: CHLOR-TRIMETON   4 mg, Oral, Every 6 Hours PRN      CoQ10 200 MG capsule   400 mg, Oral, Daily      diclofenac sodium 100 MG 24 hr tablet  Commonly known as: VOTAREN XR   100 mg, Oral, Daily      levocetirizine 5 MG tablet  Commonly known as: XYZAL   5 mg, Oral, As Needed      magnesium oxide 400 (241.3 Mg) MG tablet tablet  Commonly known as: MAGOX   400 mg, Oral, Daily      methocarbamol 500 MG tablet  Commonly known as: ROBAXIN   500 mg, Oral, As Needed, Takes 2-4 per day       metoprolol succinate XL 50 MG 24 hr tablet  Commonly known as: TOPROL-XL   50 mg, Oral, Daily      Mounjaro 7.5 MG/0.5ML solution pen-injector pen  Generic drug: Tirzepatide   Inject 0.5 mL under the skin into the appropriate area as directed 1 (One) Time Per Week. Monday      multivitamin with minerals tablet tablet   1 tablet, Oral, Daily      pantoprazole 40 MG EC tablet  Commonly known as: PROTONIX   40 mg, Oral, Daily      promethazine 25 MG tablet  Commonly known as: PHENERGAN   25 mg, Oral, As Needed      rizatriptan 10 MG tablet  Commonly known as: MAXALT   10 mg, Oral, Once As Needed, May repeat in 2 hours if needed      spironolactone 25 MG tablet  Commonly known as: ALDACTONE   25 mg, Oral, Daily      traMADol 50 MG tablet  Commonly known as: ULTRAM   50 mg, Oral, Nightly              Stop These Medications      HYDROcodone-acetaminophen 5-325 MG per tablet  Commonly known as: NORCO              Discharge Diet: No concentrated sweets    Activity at Discharge: Protected weightbearing as tolerated left lower extremity       Future Appointments   Date Time Provider Department Center   3/12/2024  1:30 PM Milagros Cuello PA-C [facility]   4/3/2024 12:30 PM Shyanne Anand PA-C [facility]   Per Dr. Buck  (Dressing to remain in place for 7 days. May remove on POD#7. If no drainage, may shower on POD#10. No submerging wound in water. If drainage is noted, sterile dressing should be placed and wound checked daily. No showering until wound has remained dry for 72 consecutive hours.   Follow up in 3 weeks for re-assessment.).      Dragon disclaimer:  Part of this encounter note is an electronic transcription/translation of spoken language to printed text. The electronic translation of spoken language may permit erroneous, or at times, nonsensical words or phrases to be inadvertently transcribed; Although I have reviewed the note for such errors, some may still exist.       Joe Castano MD  02/26/24  11:30 EST              Electronically signed by Joe Castano MD at 02/26/24 8570

## 2024-02-28 ENCOUNTER — TELEPHONE (OUTPATIENT)
Dept: ORTHOPEDIC SURGERY | Facility: CLINIC | Age: 64
End: 2024-02-28
Payer: MEDICARE

## 2024-03-01 ENCOUNTER — TELEPHONE (OUTPATIENT)
Dept: ORTHOPEDIC SURGERY | Facility: CLINIC | Age: 64
End: 2024-03-01
Payer: MEDICARE

## 2024-03-01 NOTE — TELEPHONE ENCOUNTER
Called patient and left message for her to give us a call back as soon as possible.     - Ramona CHARLES(R)

## 2024-03-01 NOTE — TELEPHONE ENCOUNTER
Patient called back. She was wanting to know when she could remove the dressing and what she could use to cover the incision. I advised Dr. Buck postops can remove the bandage after a week and along with being able to shower but not to scrub or soak the area. I let her know she could use a compression sleeve over the incision as well.       - Ramona CHARLES(R)

## 2024-03-01 NOTE — TELEPHONE ENCOUNTER
"  Caller: Jeevan Cardoso \"Neela\"    Relationship: Self    Best call back number: [phone]    What is the best time to reach you: ANY     Who are you requesting to speak with (clinical staff, provider,  specific staff member): CLINICAL     Do you know the name of the person who called: YES     What was the call regarding: PATIENT HAS BEEN AT [facility] SINCE SURGERY 02/21/2024 AND STATES THAT THEY HAVE NOT CHANGED THE BANDAGES OF THE LEFT KNEE- STATES THAT THEY STATE THEY DO NOT WISH TO TOUCH- WOULD LIKE OUR OFFICE TO GET IN TOUCH WITH [facility] TO DISCUSS   "

## 2024-03-11 ENCOUNTER — TELEPHONE (OUTPATIENT)
Dept: ORTHOPEDIC SURGERY | Facility: CLINIC | Age: 64
End: 2024-03-11
Payer: MEDICARE

## 2024-03-11 DIAGNOSIS — Z96.652 S/P TKR (TOTAL KNEE REPLACEMENT), LEFT: ICD-10-CM

## 2024-03-11 RX ORDER — OXYCODONE HYDROCHLORIDE 5 MG/1
5 TABLET ORAL EVERY 4 HOURS PRN
Qty: 40 TABLET | Refills: 0 | Status: SHIPPED | OUTPATIENT
Start: 2024-03-11 | End: 2024-03-15

## 2024-03-11 NOTE — TELEPHONE ENCOUNTER
PATIENT IS CALLING IN ABOUT THE ROM TECH BIKE AND HER MEDICATION. SHE HAD SX WITH DR. LANDAVERDE ON 02/21/24. SHE SAID SHE HAD SOME ISSUES AND WAS NOT ABLE TO USE THE BIKE. THEY HAD DROPPED IT OFF, BUT PICKED IT BACK UP. SHE WAS HOPING TO GET IT AUTHORIZED FOR USE AGAIN.    PATIENT ALSO SAID HER PHARMACY, [facility],  HAS BEEN TRYING TO GET A HOLD OF THE OFFICE FOR HER OXYCODONE REFILL. SHE USES A MAIL ORDER SERVICE.THEY NEED ADDITIONAL INFORMATION. PHONE #: [phone].     IF DR. LANDAVERDE OR CLINICAL STAFF COULD PLEASE ADVISE.       CALL BACK # [phone]

## 2024-03-11 NOTE — TELEPHONE ENCOUNTER
Spoke with patient. Informed her she needs to call [facility] to set up the bike. Advised her that insurance typically does not cover the bike after 3 weeks from surgery, but she will need to contact [facility] and they will help her with any authorization or self-pay options.     Patient requesting prescription for oxycodone 5mg. Original prescription was sent to [facility] mail delivery and patient states that they had issues filling it and she has not received it because she is in a rehab facility. Patient requesting the prescription be sent to [facility] in [place]. Pharmacy has been updated.

## 2024-03-11 NOTE — TELEPHONE ENCOUNTER
I called and spoke with the patient and I explained that Dr. Buck really wants her to do out patient physical therapy.  She explained that she has been in the hospital, then released to [facility] for unrelated reasons.  She has been doing therapy at [facility].  She will go home from [facility] on Wednesday. She will have Home Health at that point.  From what I understand that will be OT.  I explained that if she is able to go to out patient PT for her knee that would be preferred.   She also asked about the bike she was supposed to use after surgery.  Does she still need it?      I spoke with Sena and she is going to take care of getting her all set with PT.  Mary Kay

## 2024-03-12 ENCOUNTER — HOME HEALTH ADMISSION (OUTPATIENT)
Dept: HOME HEALTH SERVICES | Facility: HOME HEALTHCARE | Age: 64
End: 2024-03-12
Payer: COMMERCIAL

## 2024-03-12 ENCOUNTER — TRANSCRIBE ORDERS (OUTPATIENT)
Dept: HOME HEALTH SERVICES | Facility: HOME HEALTHCARE | Age: 64
End: 2024-03-12
Payer: MEDICARE

## 2024-03-12 DIAGNOSIS — Z47.1 AFTERCARE FOLLOWING LEFT KNEE JOINT REPLACEMENT SURGERY: Primary | ICD-10-CM

## 2024-03-12 DIAGNOSIS — Z96.652 AFTERCARE FOLLOWING LEFT KNEE JOINT REPLACEMENT SURGERY: Primary | ICD-10-CM

## 2024-03-15 ENCOUNTER — TELEPHONE (OUTPATIENT)
Dept: ORTHOPEDIC SURGERY | Facility: CLINIC | Age: 64
End: 2024-03-15

## 2024-03-15 ENCOUNTER — OFFICE VISIT (OUTPATIENT)
Dept: ORTHOPEDIC SURGERY | Facility: CLINIC | Age: 64
End: 2024-03-15
Payer: MEDICARE

## 2024-03-15 ENCOUNTER — HOME CARE VISIT (OUTPATIENT)
Dept: HOME HEALTH SERVICES | Facility: HOME HEALTHCARE | Age: 64
End: 2024-03-15
Payer: MEDICARE

## 2024-03-15 VITALS — TEMPERATURE: 97.1 F

## 2024-03-15 DIAGNOSIS — Z96.652 S/P TKR (TOTAL KNEE REPLACEMENT), LEFT: Primary | ICD-10-CM

## 2024-03-15 PROCEDURE — 99024 POSTOP FOLLOW-UP VISIT: CPT | Performed by: PHYSICIAN ASSISTANT

## 2024-03-15 PROCEDURE — 1159F MED LIST DOCD IN RCRD: CPT | Performed by: PHYSICIAN ASSISTANT

## 2024-03-15 PROCEDURE — 1160F RVW MEDS BY RX/DR IN RCRD: CPT | Performed by: PHYSICIAN ASSISTANT

## 2024-03-15 RX ORDER — DOXYCYCLINE HYCLATE 100 MG/1
100 CAPSULE ORAL DAILY
COMMUNITY
Start: 2024-03-13

## 2024-03-15 RX ORDER — IBUPROFEN 600 MG/1
600 TABLET ORAL EVERY 8 HOURS PRN
COMMUNITY
Start: 2024-02-05

## 2024-03-15 NOTE — TELEPHONE ENCOUNTER
Spoke with patient and explained FMLA process. Provided fax number as well.     **Neela returned call, advised they do not have access to a fax.  will drop off paperwork on Monday. **

## 2024-03-15 NOTE — PROGRESS NOTES
[facility] Orthopaedic Surgery Clinic Note        Subjective     Post-op (3 weeks S/P Left TKA (2/21/24))       HPI    Jeevan Cardoso is a 63 y.o. female.  Patient presents for their initial postop visit following left knee TKA with Roberto robot Lachman performed on the above date by Dr. Buck.    Pain scale: 3/10. Associated symptoms pain, swelling. Pain with walking, prolonged sitting, sleeping, lying on affected side, rising from a seated position.  Resting, medication, elevating helps to ease the pain. Using cane/walker to assist with ambulation.  Patient has been discharged from [facility] inpatient rehab and has had home health come out.  She was told she could not have outpatient PT until home health completed its course.    Patient denies any fever, chills, night sweats or other constitutional symptoms.          Objective      Physical Exam  Temp 97.1 °F (36.2 °C)   LMP  (LMP Unknown)     There is no height or weight on file to calculate BMI.        Ortho Exam  Integument:   Left knee: Incision is healing well without redness, warmth, drainage or signs of infection.    Lower Extremities:   Left knee:    Tenderness:  Generalized pain typical following TKA    Effusion:  1+     Swelling: Positive with soft, nontender calf    Crepitus:  None    Range of motion:  Extension: 0°       Flexion: 90/95° reports PT at her to 105 on 3/13/2024  Instability:  No varus laxity, no valgus laxity, negative anterior drawer  Deformities:  None      Imaging Reviewed:  Ordered left knee plain films.  Imaging read/interpreted by Dr. Buck.    Indication: Status post left total knee arthroplasty     Comparison: Todays xrays were compared to previous xrays from 2/21/2024     IMPRESSION:      Left Knee: Demonstrate well positioned knee arthroplasty components in satisfactory alignment without evidence of wear, loosening, subsidence, fracture, or osteolysis and No significant changes compared to prior  radiographs.      Assessment:  1. S/P TKR (total knee replacement), left        Plan:  Status post left TKA with Roberto robot, stable.  Reviewed imaging with patient.  Referred to formal outpatient PT.  Recommend OTC pain medication as needed.  Continue with ASA as directed for DVT prophylaxis.  No dental procedures until minimum of 3 months out from surgery.  Patient will require indefinite antibiotic prophylaxis before dental procedures.  Follow up with Dr. Buck in 3 weeks for repeat evaluation. Needs knee imaging.    Questions and concerns answered.    Answers submitted by the patient for this visit:  Other (Submitted on 3/8/2024)  Please describe your symptoms.: None. , Surgery post-op.  Have you had these symptoms before?: No  How long have you been having these symptoms?: Greater than 2 weeks  Please list any medications you are currently taking for this condition.: None  Please describe any probable cause for these symptoms. : ?  Primary Reason for Visit (Submitted on 3/8/2024)  What is the primary reason for your visit?: Other      Milagros Cuello PA-C  03/15/24  09:51 EDT      Dictated Utilizing Dragon Dictation.

## 2024-03-15 NOTE — TELEPHONE ENCOUNTER
Caller: PATIENT    Relationship: SELF    Best call back number: [phone] (home)     What form or medical record are you requesting: JERAMY PAPERWORK     Timeframe paperwork needed: PAPERWORK NEEDS TO BE FILLED OUT FOR HER  GE TO TAKE HER TO APPTS, PHYSICAL THERAPY, AND HELP HER AFTER SX. SHE NEEDS TO KNOW WHAT TO DO WITH THE FORMS.

## 2024-03-18 ENCOUNTER — TELEPHONE (OUTPATIENT)
Dept: ORTHOPEDIC SURGERY | Facility: CLINIC | Age: 64
End: 2024-03-18
Payer: MEDICARE

## 2024-03-18 NOTE — TELEPHONE ENCOUNTER
Provider: AKHIL    Caller: PATIENT    Phone Number: [phone]    Reason for Call: PATIENT STATES THAT AFTER HER SURGERY ON 02/21/24, SHE HAS DEVELOPED A WOUND ON HER COCCYX FROM BEING IN BED FOR SO LONG. SHE STATES THAT HOME HEALTH CAME IN YESTERDAY, AND THEY RECOMMENDED SHE GET ESTABLISHED WITH WOUND CARE SO SHE CAN CONTINUE TO GET SUPPLIES TO TAKE CARE OF THE AREA. SHE IS ASKING IF DR. LANDAVERDE WILL PUT THIS REFERRAL IN FOR HER. PLEASE CALL HER TO DISCUSS.

## 2024-03-18 NOTE — TELEPHONE ENCOUNTER
LVM for patient stating that she needs to contact her PCP's office for a referral and to contact our office if she has any questions.

## 2024-03-19 ENCOUNTER — HOSPITAL ENCOUNTER (OUTPATIENT)
Dept: PHYSICAL THERAPY | Facility: HOSPITAL | Age: 64
Setting detail: THERAPIES SERIES
Discharge: HOME | End: 2024-03-19
Payer: MEDICARE

## 2024-03-19 DIAGNOSIS — K75.81 LIVER CIRRHOSIS SECONDARY TO NASH: Chronic | ICD-10-CM

## 2024-03-19 DIAGNOSIS — L89.153 PRESSURE INJURY OF SACRAL REGION, STAGE 3: Primary | ICD-10-CM

## 2024-03-19 DIAGNOSIS — K74.60 LIVER CIRRHOSIS SECONDARY TO NASH: Chronic | ICD-10-CM

## 2024-03-19 DIAGNOSIS — G43.709 CHRONIC MIGRAINE W/O AURA W/O STATUS MIGRAINOSUS, NOT INTRACTABLE: ICD-10-CM

## 2024-03-19 DIAGNOSIS — E11.9 TYPE 2 DIABETES MELLITUS WITHOUT COMPLICATION, WITHOUT LONG-TERM CURRENT USE OF INSULIN: Chronic | ICD-10-CM

## 2024-03-19 PROCEDURE — 97597 DBRDMT OPN WND 1ST 20 CM/<: CPT

## 2024-03-19 PROCEDURE — 97162 PT EVAL MOD COMPLEX 30 MIN: CPT
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
## 2024-03-26 ENCOUNTER — APPOINTMENT (OUTPATIENT)
Dept: PHYSICAL THERAPY | Facility: HOSPITAL | Age: 64
End: 2024-03-26
Payer: MEDICARE

## 2024-03-28 ENCOUNTER — HOSPITAL ENCOUNTER (OUTPATIENT)
Dept: PHYSICAL THERAPY | Facility: HOSPITAL | Age: 64
Setting detail: THERAPIES SERIES
Discharge: HOME | End: 2024-03-28
Payer: MEDICARE

## 2024-03-28 DIAGNOSIS — L89.153 PRESSURE INJURY OF SACRAL REGION, STAGE 3: Primary | ICD-10-CM

## 2024-03-28 PROCEDURE — 97597 DBRDMT OPN WND 1ST 20 CM/<: CPT
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
## 2024-04-05 ENCOUNTER — HOSPITAL ENCOUNTER (OUTPATIENT)
Dept: PHYSICAL THERAPY | Facility: HOSPITAL | Age: 64
Setting detail: THERAPIES SERIES
Discharge: HOME | End: 2024-04-05
Payer: MEDICARE

## 2024-04-05 DIAGNOSIS — L89.153 PRESSURE INJURY OF SACRAL REGION, STAGE 3: Primary | ICD-10-CM

## 2024-04-05 DIAGNOSIS — E11.9 TYPE 2 DIABETES MELLITUS WITHOUT COMPLICATION, WITHOUT LONG-TERM CURRENT USE OF INSULIN: ICD-10-CM

## 2024-04-05 PROCEDURE — 97597 DBRDMT OPN WND 1ST 20 CM/<: CPT
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
## 2024-04-08 ENCOUNTER — TELEPHONE (OUTPATIENT)
Dept: GASTROENTEROLOGY | Facility: CLINIC | Age: 64
End: 2024-04-08

## 2024-04-12 ENCOUNTER — HOSPITAL ENCOUNTER (OUTPATIENT)
Dept: PHYSICAL THERAPY | Facility: HOSPITAL | Age: 64
Setting detail: THERAPIES SERIES
Discharge: HOME | End: 2024-04-12
Payer: MEDICARE

## 2024-04-12 DIAGNOSIS — L89.153 PRESSURE INJURY OF SACRAL REGION, STAGE 3: Primary | ICD-10-CM

## 2024-04-12 PROCEDURE — 97597 DBRDMT OPN WND 1ST 20 CM/<: CPT
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
## 2024-04-19 ENCOUNTER — OFFICE VISIT (OUTPATIENT)
Dept: ORTHOPEDIC SURGERY | Facility: CLINIC | Age: 64
End: 2024-04-19
Payer: MEDICARE

## 2024-04-19 ENCOUNTER — HOSPITAL ENCOUNTER (OUTPATIENT)
Dept: PHYSICAL THERAPY | Facility: HOSPITAL | Age: 64
Setting detail: THERAPIES SERIES
Discharge: HOME | End: 2024-04-19
Payer: MEDICARE

## 2024-04-19 DIAGNOSIS — Z96.652 S/P TKR (TOTAL KNEE REPLACEMENT), LEFT: Primary | ICD-10-CM

## 2024-04-19 DIAGNOSIS — L89.153 PRESSURE INJURY OF SACRAL REGION, STAGE 3: Primary | ICD-10-CM

## 2024-04-19 PROCEDURE — 1160F RVW MEDS BY RX/DR IN RCRD: CPT | Performed by: ORTHOPAEDIC SURGERY

## 2024-04-19 PROCEDURE — 97597 DBRDMT OPN WND 1ST 20 CM/<: CPT

## 2024-04-19 PROCEDURE — 1159F MED LIST DOCD IN RCRD: CPT | Performed by: ORTHOPAEDIC SURGERY

## 2024-04-19 PROCEDURE — 99024 POSTOP FOLLOW-UP VISIT: CPT | Performed by: ORTHOPAEDIC SURGERY

## 2024-04-19 RX ORDER — ONDANSETRON 4 MG/1
TABLET, ORALLY DISINTEGRATING ORAL
COMMUNITY
Start: 2024-03-22

## 2024-04-19 RX ORDER — LOPERAMIDE HYDROCHLORIDE 2 MG/1
CAPSULE ORAL
COMMUNITY
Start: 2024-04-05

## 2024-04-19 RX ORDER — BUMETANIDE 0.5 MG/1
TABLET ORAL
COMMUNITY
Start: 2024-03-22

## 2024-04-19 RX ORDER — LACTULOSE 10 G/15ML
SOLUTION ORAL AS NEEDED
COMMUNITY
Start: 2024-03-26
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
## 2024-04-24 ENCOUNTER — APPOINTMENT (OUTPATIENT)
Dept: PHYSICAL THERAPY | Facility: HOSPITAL | Age: 64
End: 2024-04-24
Payer: MEDICARE

## 2024-04-25 ENCOUNTER — APPOINTMENT (OUTPATIENT)
Dept: PHYSICAL THERAPY | Facility: HOSPITAL | Age: 64
End: 2024-04-25
Payer: MEDICARE

## 2024-05-01 ENCOUNTER — HOSPITAL ENCOUNTER (OUTPATIENT)
Dept: PHYSICAL THERAPY | Facility: HOSPITAL | Age: 64
Setting detail: THERAPIES SERIES
Discharge: HOME | End: 2024-05-01
Payer: MEDICARE

## 2024-05-01 DIAGNOSIS — L89.153 PRESSURE INJURY OF SACRAL REGION, STAGE 3: Primary | ICD-10-CM

## 2024-05-01 PROCEDURE — 97597 DBRDMT OPN WND 1ST 20 CM/<: CPT

## 2024-05-22 ENCOUNTER — TELEPHONE (OUTPATIENT)
Dept: ORTHOPEDIC SURGERY | Facility: CLINIC | Age: 64
End: 2024-05-22
Payer: MEDICARE

## 2024-05-22 DIAGNOSIS — Z96.652 S/P TKR (TOTAL KNEE REPLACEMENT), LEFT: Primary | ICD-10-CM

## 2024-05-22 NOTE — TELEPHONE ENCOUNTER
Physical therapist calling because of the patients current medical status. Patient is declining and not able to make it to out patient therapy. Patient stated that she had fallen and is now in pain in her knee. PT suggests that she do Home health due to her current health status. Dr. Mcnamara's notes are in patients chart and she is scheduled to see him tomorrow.     [phone]  [facility] feel free to contact.

## 2024-05-23 NOTE — TELEPHONE ENCOUNTER
Spoke with pt to let her know Home Health PT order had been placed and that hopefully [facility] will contact her in the next couple of days to set up those visits. She understood. She will call back with any other questions or concerns.    Yuli YO CMA ([facility]), ROT

## 2024-05-23 NOTE — TELEPHONE ENCOUNTER
Sherry-can you place a Home Health PT order for this Dr. Buck pt who is 3 months s/p left TKA?  She was doing well with outpatient PT and was planned to be discharged within the next 4 weeks but is having some other health issues that is making it hard to make it to outpatient PT and doesn't want to lose her progress. Please advise. Thanks!    Spoke to pt in regards to the message we received from her PT yesterday; She did report having an initial fall but did come in to see Dr. Buck for xrays after that fall; She does report having some weakness recently and being low on magnesium in which her PCP is working her up for; With this weakness she did have an incident within the last week where she woke up in the middle of the night and ended up collapsing to the ground but she reports not having involvement with the knee (didn't fall directly onto the knee); She notes that last Tuesday she was doing great with PT but since the weakness has started she feels like she is losing ROM and wasn't able to go to her PT session yesterday. Her and her PT are wondering if she could do some Home Health PT until her PCP can figure out why she is having this fatigue/weakness. I told her I would send the message back and get back with her with a response. She understood.    Yuli YO CMA ([facility]), ROT     show

## 2024-05-31 ENCOUNTER — TELEPHONE (OUTPATIENT)
Dept: ORTHOPEDIC SURGERY | Facility: CLINIC | Age: 64
End: 2024-05-31
Payer: MEDICARE

## 2024-05-31 NOTE — TELEPHONE ENCOUNTER
Spoke to pt regarding her message; She reports over the last week to 2 weeks she has been declining as far as her pain and progress with therapy; She reports having taken a couple of falls (the first one we did evaluate and clear her from) and the last 2 falls she hasn't directly fallen on the knee but slid down out of her walker and was seen by her PCP afterwards and was also cleared. She reports that her pain and swelling has increased and she can only weightbear once up on her walker but without the walker she has excruciating pain with weightbearing. She denies any specific calf pain and reports the pain is more so in the back of her thigh and the top part of her knee.       Today she reports the pain has been 9/10. She is requesting a refill of either ibuprofen or oxycodone; I explained that unfortunately Dr. Buck was out of the office but asked her what she had available to take for pain. She reports having maybe 4 tabs left of oxycodone and maybe 2 tabs left of ibuprofen 600mg. I advised she reserve the oxycodone for night and switch to OTC ibuprofen once her Rx ran out; Instructed she could take 600mg of ibuprofen every 6 hours or 800mg every 8 hours. Also encouraged her to rest, ice, and elevate. She does have an appt Tuesday to see Dr. Buck and she also should be hearing from the Home Health PT agency Sunday about coming out on Monday.    Will follow up as scheduled with Dr. Buck on Tuesday.    Yuli YO CMA ([facility]), ROT

## 2024-05-31 NOTE — TELEPHONE ENCOUNTER
PATIENT CALLED IN STATING SHE IS IN MORE PAIN. PATIENT IS REQUESTING MORE MEDICATION EITHER PAIN MEDICATION OR ANTI-INFLAMMATORY.     PLEASE ADVISE PATIENT. PATIENT HAS BEEN TAKING PAIN MEDICATION AS NEEDED, ALSO TAKING TYLENOL AS WELL.

## 2024-06-04 ENCOUNTER — TELEPHONE (OUTPATIENT)
Dept: ORTHOPEDIC SURGERY | Facility: CLINIC | Age: 64
End: 2024-06-04

## 2024-06-04 ENCOUNTER — OFFICE VISIT (OUTPATIENT)
Dept: ORTHOPEDIC SURGERY | Facility: CLINIC | Age: 64
End: 2024-06-04
Payer: MEDICARE

## 2024-06-04 VITALS — BODY MASS INDEX: 32.92 KG/M2 | SYSTOLIC BLOOD PRESSURE: 118 MMHG | HEIGHT: 62 IN | DIASTOLIC BLOOD PRESSURE: 70 MMHG

## 2024-06-04 DIAGNOSIS — M79.89 LEFT LEG SWELLING: ICD-10-CM

## 2024-06-04 DIAGNOSIS — Z96.652 S/P TKR (TOTAL KNEE REPLACEMENT), LEFT: Primary | ICD-10-CM

## 2024-06-04 PROCEDURE — 3078F DIAST BP <80 MM HG: CPT | Performed by: ORTHOPAEDIC SURGERY

## 2024-06-04 PROCEDURE — 3074F SYST BP LT 130 MM HG: CPT | Performed by: ORTHOPAEDIC SURGERY

## 2024-06-04 PROCEDURE — 99213 OFFICE O/P EST LOW 20 MIN: CPT | Performed by: ORTHOPAEDIC SURGERY

## 2024-06-04 RX ORDER — SPIRONOLACTONE 50 MG/1
TABLET, FILM COATED ORAL
COMMUNITY
Start: 2024-05-23

## 2024-06-04 RX ORDER — ALPRAZOLAM 1 MG/1
TABLET ORAL
COMMUNITY
Start: 2024-05-31

## 2024-06-04 RX ORDER — OXYCODONE HYDROCHLORIDE 5 MG/1
5 TABLET ORAL EVERY 4 HOURS PRN
COMMUNITY

## 2024-06-04 NOTE — TELEPHONE ENCOUNTER
AIDEE  The patient had a fall about 2 weeks ago.  She has had more trouble with her knee since.  She has a decrease ROM since her fall. She is having swelling behind her knee.  She states that it is 'mis shaped'.  She has had no fever, chills, no redness, a little pink. No pain in her calf. Her PT is concerned about a blood clot.  She does have an appointment with Dr. Buck at 3:10.  The patient is not able to get here before 3:00ish.  Her  has to leave work early.  Mary Kay

## 2024-06-04 NOTE — TELEPHONE ENCOUNTER
PATIENT NEEDS VERBAL ORDERS FOR PT.     2 TIMES A WEEK FOR 4 WEEK    1 TIME A WEEK FOR 4 WEEKS    PT IS CONCERNED ABOUT PATIENT'S LEG AS WELL. WORRIED ABOUT DVT.      [phone] - GOOD CALL BACK NUMBER.
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
## 2024-06-05 ENCOUNTER — HOSPITAL ENCOUNTER (OUTPATIENT)
Facility: HOSPITAL | Age: 64
Discharge: HOME | End: 2024-06-05
Admitting: ORTHOPAEDIC SURGERY
Payer: MEDICARE

## 2024-06-05 DIAGNOSIS — M79.89 LEFT LEG SWELLING: ICD-10-CM

## 2024-06-05 LAB

## 2024-06-05 PROCEDURE — 93971 EXTREMITY STUDY: CPT

## 2024-06-20 ENCOUNTER — OFFICE VISIT (OUTPATIENT)
Dept: ORTHOPEDIC SURGERY | Facility: CLINIC | Age: 64
End: 2024-06-20
Payer: MEDICARE

## 2024-06-20 VITALS — HEIGHT: 62 IN | BODY MASS INDEX: 35.74 KG/M2 | WEIGHT: 194.2 LBS

## 2024-06-20 DIAGNOSIS — Z96.652 S/P TKR (TOTAL KNEE REPLACEMENT), LEFT: Primary | ICD-10-CM

## 2024-06-20 RX ORDER — SPIRONOLACTONE 100 MG/1
TABLET, FILM COATED ORAL
COMMUNITY
Start: 2024-06-18

## 2024-06-20 RX ORDER — FUROSEMIDE 40 MG/1
40 TABLET ORAL DAILY
COMMUNITY
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
## 2024-06-25 ENCOUNTER — TELEPHONE (OUTPATIENT)
Dept: NEUROLOGY | Facility: CLINIC | Age: 64
End: 2024-06-25
Payer: MEDICARE

## 2024-06-25 NOTE — TELEPHONE ENCOUNTER
"    Caller: Jeevan Cardoso \"GREG\"    Relationship: Self    Best call back number: [phone]    Who is your current provider: [facility]    Is your current provider offboarding? NO    Who would you like your new provider to be: [facility]ER [facility]    What are your reasons for transferring care: FELT LIKE PROVIDER WANTED HER TO CONTINUE WITH [facility] EVEN THOUGH SHE FELT IT WASN'T WORKING AND THE COST OF TREATMENT WAS PROHIBITIVE     "

## 2024-06-26 NOTE — TELEPHONE ENCOUNTER
Called Neeal and let her know this has been approved by both providers. Offered 's first established NP slot in October and was going to add her to the waitlist however patient states her  brings her to appointments and cannot make a morning appointment.    Placed her on a Wednesday 30minute slot @ 3pm in August and she states appreciation. She is in bed with a migraine currently so cannot write down address at this time, but informed she does use My Chart so I am sending her a My Chart message confirming appt time/date and our office address for .    She states appreciation.

## 2024-07-16 ENCOUNTER — TELEPHONE (OUTPATIENT)
Dept: ORTHOPEDIC SURGERY | Facility: CLINIC | Age: 64
End: 2024-07-16
Payer: MEDICARE

## 2024-07-16 DIAGNOSIS — Z96.652 S/P TKR (TOTAL KNEE REPLACEMENT), LEFT: Primary | ICD-10-CM

## 2024-07-16 NOTE — TELEPHONE ENCOUNTER
[facility] called stated the patient is ready to be discharged and is needing an order of physical therapy put in and be faxed at [facility], fax # [phone].    Please advise, thank you

## 2024-07-29 ENCOUNTER — TRANSCRIBE ORDERS (OUTPATIENT)
Dept: ADMINISTRATIVE | Facility: HOSPITAL | Age: 64
End: 2024-07-29
Payer: MEDICARE

## 2024-07-29 DIAGNOSIS — Z12.31 VISIT FOR SCREENING MAMMOGRAM: Primary | ICD-10-CM

## 2024-08-06 ENCOUNTER — TRANSCRIBE ORDERS (OUTPATIENT)
Dept: ADMINISTRATIVE | Facility: HOSPITAL | Age: 64
End: 2024-08-06
Payer: MEDICARE

## 2024-08-06 DIAGNOSIS — K74.60 LIVER CIRRHOSIS SECONDARY TO NASH (NONALCOHOLIC STEATOHEPATITIS): Primary | ICD-10-CM

## 2024-08-06 DIAGNOSIS — K75.81 LIVER CIRRHOSIS SECONDARY TO NASH (NONALCOHOLIC STEATOHEPATITIS): Primary | ICD-10-CM

## 2024-08-21 ENCOUNTER — DOCUMENTATION (OUTPATIENT)
Dept: PHYSICAL THERAPY | Facility: HOSPITAL | Age: 64
End: 2024-08-21
Payer: MEDICARE
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
## 2024-10-03 LAB
NCCN CRITERIA FLAG: NORMAL
TYRER CUZICK SCORE: 9.3

## 2024-10-18 ENCOUNTER — HOSPITAL ENCOUNTER (OUTPATIENT)
Dept: MAMMOGRAPHY | Facility: HOSPITAL | Age: 64
Discharge: HOME | End: 2024-10-18
Admitting: FAMILY MEDICINE
Payer: MEDICARE

## 2024-10-18 DIAGNOSIS — Z12.31 VISIT FOR SCREENING MAMMOGRAM: ICD-10-CM

## 2024-10-18 PROCEDURE — 77067 SCR MAMMO BI INCL CAD: CPT

## 2024-10-18 PROCEDURE — 77063 BREAST TOMOSYNTHESIS BI: CPT

## 2024-10-30 ENCOUNTER — HOSPITAL ENCOUNTER (OUTPATIENT)
Dept: ULTRASOUND IMAGING | Facility: HOSPITAL | Age: 64
Discharge: HOME | End: 2024-10-30
Admitting: INTERNAL MEDICINE
Payer: MEDICARE

## 2024-10-30 DIAGNOSIS — K74.60 LIVER CIRRHOSIS SECONDARY TO NASH (NONALCOHOLIC STEATOHEPATITIS): ICD-10-CM

## 2024-10-30 DIAGNOSIS — K75.81 LIVER CIRRHOSIS SECONDARY TO NASH (NONALCOHOLIC STEATOHEPATITIS): ICD-10-CM

## 2024-10-30 PROCEDURE — 76705 ECHO EXAM OF ABDOMEN: CPT

## 2024-11-16 ENCOUNTER — HOSPITAL ENCOUNTER (EMERGENCY)
Facility: HOSPITAL | Age: 64
Discharge: HOME | End: 2024-11-16
Attending: EMERGENCY MEDICINE
Payer: MEDICARE

## 2024-11-16 VITALS
SYSTOLIC BLOOD PRESSURE: 130 MMHG | HEIGHT: 62 IN | RESPIRATION RATE: 16 BRPM | WEIGHT: 180 LBS | DIASTOLIC BLOOD PRESSURE: 67 MMHG | BODY MASS INDEX: 33.13 KG/M2 | OXYGEN SATURATION: 96 % | HEART RATE: 68 BPM | TEMPERATURE: 97.9 F

## 2024-11-16 DIAGNOSIS — R79.89 ELEVATED SERUM CREATININE: ICD-10-CM

## 2024-11-16 DIAGNOSIS — K76.82 HEPATIC ENCEPHALOPATHY: Primary | ICD-10-CM

## 2024-11-16 DIAGNOSIS — K72.10 END STAGE LIVER DISEASE: ICD-10-CM

## 2024-11-16 LAB
ALBUMIN SERPL-MCNC: 3.2 G/DL (ref 3.5–5.2)
ALBUMIN/GLOB SERPL: 0.7 G/DL
ALP SERPL-CCNC: 91 U/L (ref 39–117)
ALT SERPL W P-5'-P-CCNC: 8 U/L (ref 1–33)
AMMONIA BLD-SCNC: 95 UMOL/L (ref 11–51)
ANION GAP SERPL CALCULATED.3IONS-SCNC: 9 MMOL/L (ref 5–15)
AST SERPL-CCNC: 28 U/L (ref 1–32)
BACTERIA UR QL AUTO: ABNORMAL /HPF
BASOPHILS # BLD AUTO: 0.01 10*3/MM3 (ref 0–0.2)
BASOPHILS NFR BLD AUTO: 0.3 % (ref 0–1.5)
BILIRUB SERPL-MCNC: 1.3 MG/DL (ref 0–1.2)
BILIRUB UR QL STRIP: NEGATIVE
BUN SERPL-MCNC: 38 MG/DL (ref 8–23)
BUN/CREAT SERPL: 15.7 (ref 7–25)
CALCIUM SPEC-SCNC: 8.9 MG/DL (ref 8.6–10.5)
CHLORIDE SERPL-SCNC: 105 MMOL/L (ref 98–107)
CLARITY UR: ABNORMAL
CO2 SERPL-SCNC: 24 MMOL/L (ref 22–29)
COLOR UR: ABNORMAL
CREAT SERPL-MCNC: 2.42 MG/DL (ref 0.57–1)
DEPRECATED RDW RBC AUTO: 49 FL (ref 37–54)
EGFRCR SERPLBLD CKD-EPI 2021: 22.2 ML/MIN/1.73
EOSINOPHIL # BLD AUTO: 0.03 10*3/MM3 (ref 0–0.4)
EOSINOPHIL NFR BLD AUTO: 0.9 % (ref 0.3–6.2)
ERYTHROCYTE [DISTWIDTH] IN BLOOD BY AUTOMATED COUNT: 13.1 % (ref 12.3–15.4)
GLOBULIN UR ELPH-MCNC: 4.5 GM/DL
GLUCOSE SERPL-MCNC: 233 MG/DL (ref 65–99)
GLUCOSE UR STRIP-MCNC: NEGATIVE MG/DL
HCT VFR BLD AUTO: 40.3 % (ref 34–46.6)
HGB BLD-MCNC: 12.8 G/DL (ref 12–15.9)
HGB UR QL STRIP.AUTO: NEGATIVE
HYALINE CASTS UR QL AUTO: ABNORMAL /LPF
IMM GRANULOCYTES # BLD AUTO: 0.01 10*3/MM3 (ref 0–0.05)
IMM GRANULOCYTES NFR BLD AUTO: 0.3 % (ref 0–0.5)
KETONES UR QL STRIP: NEGATIVE
LEUKOCYTE ESTERASE UR QL STRIP.AUTO: ABNORMAL
LYMPHOCYTES # BLD AUTO: 0.48 10*3/MM3 (ref 0.7–3.1)
LYMPHOCYTES NFR BLD AUTO: 15 % (ref 19.6–45.3)
MCH RBC QN AUTO: 32.1 PG (ref 26.6–33)
MCHC RBC AUTO-ENTMCNC: 31.8 G/DL (ref 31.5–35.7)
MCV RBC AUTO: 101 FL (ref 79–97)
MONOCYTES # BLD AUTO: 0.24 10*3/MM3 (ref 0.1–0.9)
MONOCYTES NFR BLD AUTO: 7.5 % (ref 5–12)
NEUTROPHILS NFR BLD AUTO: 2.42 10*3/MM3 (ref 1.7–7)
NEUTROPHILS NFR BLD AUTO: 76 % (ref 42.7–76)
NITRITE UR QL STRIP: NEGATIVE
NRBC BLD AUTO-RTO: 0 /100 WBC (ref 0–0.2)
PH UR STRIP.AUTO: 5.5 [PH] (ref 5–8)
PLATELET # BLD AUTO: 84 10*3/MM3 (ref 140–450)
PMV BLD AUTO: 10.6 FL (ref 6–12)
POTASSIUM SERPL-SCNC: 4.6 MMOL/L (ref 3.5–5.2)
PROT SERPL-MCNC: 7.7 G/DL (ref 6–8.5)
PROT UR QL STRIP: NEGATIVE
RBC # BLD AUTO: 3.99 10*6/MM3 (ref 3.77–5.28)
RBC # UR STRIP: ABNORMAL /HPF
REF LAB TEST METHOD: ABNORMAL
SODIUM SERPL-SCNC: 138 MMOL/L (ref 136–145)
SP GR UR STRIP: 1.01 (ref 1–1.03)
SQUAMOUS #/AREA URNS HPF: ABNORMAL /HPF
UROBILINOGEN UR QL STRIP: ABNORMAL
WBC # UR STRIP: ABNORMAL /HPF
WBC NRBC COR # BLD AUTO: 3.19 10*3/MM3 (ref 3.4–10.8)

## 2024-11-16 PROCEDURE — 25810000003 SODIUM CHLORIDE 0.9 % SOLUTION: Performed by: EMERGENCY MEDICINE

## 2024-11-16 PROCEDURE — 80053 COMPREHEN METABOLIC PANEL: CPT | Performed by: EMERGENCY MEDICINE

## 2024-11-16 PROCEDURE — 82140 ASSAY OF AMMONIA: CPT | Performed by: EMERGENCY MEDICINE

## 2024-11-16 PROCEDURE — 81001 URINALYSIS AUTO W/SCOPE: CPT | Performed by: EMERGENCY MEDICINE

## 2024-11-16 PROCEDURE — 85025 COMPLETE CBC W/AUTO DIFF WBC: CPT | Performed by: EMERGENCY MEDICINE

## 2024-11-16 PROCEDURE — 36415 COLL VENOUS BLD VENIPUNCTURE: CPT

## 2024-11-16 PROCEDURE — 99283 EMERGENCY DEPT VISIT LOW MDM: CPT

## 2024-11-16 RX ADMIN — SODIUM CHLORIDE 1000 ML: 9 INJECTION, SOLUTION INTRAVENOUS at 13:41
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
## 2024-11-16 NOTE — DISCHARGE INSTRUCTIONS
Increase fluid intake by 50%    Increase lactulose to 3 times per day for the next 2 days and any time you start to get confused again.    Return if worse.   Pt seen and examined - no interval changes   Home
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
## 2024-11-22 ENCOUNTER — LAB (OUTPATIENT)
Dept: LAB | Facility: HOSPITAL | Age: 64
End: 2024-11-22
Payer: MEDICARE

## 2024-11-22 ENCOUNTER — PRIOR AUTHORIZATION (OUTPATIENT)
Dept: GASTROENTEROLOGY | Facility: CLINIC | Age: 64
End: 2024-11-22
Payer: MEDICARE

## 2024-11-22 ENCOUNTER — OFFICE VISIT (OUTPATIENT)
Dept: GASTROENTEROLOGY | Facility: CLINIC | Age: 64
End: 2024-11-22
Payer: MEDICARE

## 2024-11-22 VITALS
OXYGEN SATURATION: 92 % | HEART RATE: 59 BPM | WEIGHT: 186 LBS | DIASTOLIC BLOOD PRESSURE: 68 MMHG | TEMPERATURE: 97.3 F | SYSTOLIC BLOOD PRESSURE: 130 MMHG | HEIGHT: 62 IN | BODY MASS INDEX: 34.23 KG/M2

## 2024-11-22 DIAGNOSIS — K75.81 LIVER CIRRHOSIS SECONDARY TO NASH: ICD-10-CM

## 2024-11-22 DIAGNOSIS — K74.60 LIVER CIRRHOSIS SECONDARY TO NASH: ICD-10-CM

## 2024-11-22 DIAGNOSIS — K76.82 HEPATIC ENCEPHALOPATHY: ICD-10-CM

## 2024-11-22 DIAGNOSIS — K75.81 LIVER CIRRHOSIS SECONDARY TO NASH: Primary | ICD-10-CM

## 2024-11-22 DIAGNOSIS — K74.60 LIVER CIRRHOSIS SECONDARY TO NASH: Primary | ICD-10-CM

## 2024-11-22 LAB
ALBUMIN SERPL-MCNC: 3.1 G/DL (ref 3.5–5.2)
ALBUMIN/GLOB SERPL: 0.8 G/DL
ALP SERPL-CCNC: 86 U/L (ref 39–117)
ALT SERPL W P-5'-P-CCNC: 8 U/L (ref 1–33)
ANION GAP SERPL CALCULATED.3IONS-SCNC: 9.1 MMOL/L (ref 5–15)
AST SERPL-CCNC: 26 U/L (ref 1–32)
BASOPHILS # BLD AUTO: 0.02 10*3/MM3 (ref 0–0.2)
BASOPHILS NFR BLD AUTO: 0.6 % (ref 0–1.5)
BILIRUB SERPL-MCNC: 1.8 MG/DL (ref 0–1.2)
BUN SERPL-MCNC: 30 MG/DL (ref 8–23)
BUN/CREAT SERPL: 14.3 (ref 7–25)
CALCIUM SPEC-SCNC: 8.9 MG/DL (ref 8.6–10.5)
CHLORIDE SERPL-SCNC: 106 MMOL/L (ref 98–107)
CO2 SERPL-SCNC: 23.9 MMOL/L (ref 22–29)
CREAT SERPL-MCNC: 2.1 MG/DL (ref 0.57–1)
DEPRECATED RDW RBC AUTO: 42 FL (ref 37–54)
EGFRCR SERPLBLD CKD-EPI 2021: 26.4 ML/MIN/1.73
EOSINOPHIL # BLD AUTO: 0.1 10*3/MM3 (ref 0–0.4)
EOSINOPHIL NFR BLD AUTO: 3.2 % (ref 0.3–6.2)
ERYTHROCYTE [DISTWIDTH] IN BLOOD BY AUTOMATED COUNT: 12.2 % (ref 12.3–15.4)
GLOBULIN UR ELPH-MCNC: 4.1 GM/DL
GLUCOSE SERPL-MCNC: 109 MG/DL (ref 65–99)
HCT VFR BLD AUTO: 33.4 % (ref 34–46.6)
HGB BLD-MCNC: 11.6 G/DL (ref 12–15.9)
IMM GRANULOCYTES # BLD AUTO: 0.01 10*3/MM3 (ref 0–0.05)
IMM GRANULOCYTES NFR BLD AUTO: 0.3 % (ref 0–0.5)
INR PPP: 1.42 (ref 0.89–1.12)
LYMPHOCYTES # BLD AUTO: 0.59 10*3/MM3 (ref 0.7–3.1)
LYMPHOCYTES NFR BLD AUTO: 19 % (ref 19.6–45.3)
MCH RBC QN AUTO: 32.7 PG (ref 26.6–33)
MCHC RBC AUTO-ENTMCNC: 34.7 G/DL (ref 31.5–35.7)
MCV RBC AUTO: 94.1 FL (ref 79–97)
MONOCYTES # BLD AUTO: 0.44 10*3/MM3 (ref 0.1–0.9)
MONOCYTES NFR BLD AUTO: 14.1 % (ref 5–12)
NEUTROPHILS NFR BLD AUTO: 1.95 10*3/MM3 (ref 1.7–7)
NEUTROPHILS NFR BLD AUTO: 62.8 % (ref 42.7–76)
NRBC BLD AUTO-RTO: 0 /100 WBC (ref 0–0.2)
PLATELET # BLD AUTO: 80 10*3/MM3 (ref 140–450)
PMV BLD AUTO: 10.8 FL (ref 6–12)
POTASSIUM SERPL-SCNC: 4.4 MMOL/L (ref 3.5–5.2)
PROT SERPL-MCNC: 7.2 G/DL (ref 6–8.5)
PROTHROMBIN TIME: 17.5 SECONDS (ref 12.2–14.5)
RBC # BLD AUTO: 3.55 10*6/MM3 (ref 3.77–5.28)
SODIUM SERPL-SCNC: 139 MMOL/L (ref 136–145)
WBC NRBC COR # BLD AUTO: 3.11 10*3/MM3 (ref 3.4–10.8)

## 2024-11-22 PROCEDURE — 85610 PROTHROMBIN TIME: CPT

## 2024-11-22 PROCEDURE — 80053 COMPREHEN METABOLIC PANEL: CPT

## 2024-11-22 PROCEDURE — 1159F MED LIST DOCD IN RCRD: CPT | Performed by: NURSE PRACTITIONER

## 2024-11-22 PROCEDURE — 99214 OFFICE O/P EST MOD 30 MIN: CPT | Performed by: NURSE PRACTITIONER

## 2024-11-22 PROCEDURE — 3078F DIAST BP <80 MM HG: CPT | Performed by: NURSE PRACTITIONER

## 2024-11-22 PROCEDURE — 85025 COMPLETE CBC W/AUTO DIFF WBC: CPT

## 2024-11-22 PROCEDURE — 3075F SYST BP GE 130 - 139MM HG: CPT | Performed by: NURSE PRACTITIONER

## 2024-11-22 PROCEDURE — 1160F RVW MEDS BY RX/DR IN RCRD: CPT | Performed by: NURSE PRACTITIONER

## 2024-11-22 PROCEDURE — 36415 COLL VENOUS BLD VENIPUNCTURE: CPT

## 2024-11-22 RX ORDER — MAGNESIUM GLUCONATE 27 MG(500)
500 TABLET ORAL 2 TIMES DAILY
COMMUNITY

## 2024-11-22 RX ORDER — FERROUS SULFATE 325(65) MG
325 TABLET ORAL
COMMUNITY

## 2024-11-22 RX ORDER — ONDANSETRON 8 MG/1
8 TABLET, ORALLY DISINTEGRATING ORAL EVERY 8 HOURS PRN
COMMUNITY
Start: 2024-11-19

## 2024-11-22 RX ORDER — LACTULOSE 10 G/15ML
20 SOLUTION ORAL
COMMUNITY

## 2024-11-22 RX ORDER — DICLOFENAC SODIUM 100 MG/1
100 TABLET, EXTENDED RELEASE ORAL DAILY
COMMUNITY
Start: 2024-11-20

## 2024-11-22 RX ORDER — NADOLOL 40 MG/1
40 TABLET ORAL DAILY
COMMUNITY

## 2024-11-22 RX ORDER — OLOPATADINE HYDROCHLORIDE 1 MG/ML
1 SOLUTION/ DROPS OPHTHALMIC 2 TIMES DAILY
COMMUNITY

## 2024-11-22 RX ORDER — PANTOPRAZOLE SODIUM 40 MG/1
40 TABLET, DELAYED RELEASE ORAL DAILY
COMMUNITY

## 2024-11-22 RX ORDER — SPIRONOLACTONE 50 MG/1
50 TABLET, FILM COATED ORAL DAILY
COMMUNITY

## 2024-11-22 RX ORDER — METOCLOPRAMIDE HYDROCHLORIDE 5 MG/1
5 TABLET ORAL 4 TIMES DAILY
COMMUNITY
Start: 2024-11-18

## 2024-11-22 RX ORDER — ALPRAZOLAM 0.5 MG
0.5 TABLET ORAL 2 TIMES DAILY PRN
COMMUNITY

## 2024-11-22 NOTE — TELEPHONE ENCOUNTER
[facility] was not able to process the request because the previous Prior Authorization Request was Denied, please contact the plan at [phone] or fax in request to [phone].

## 2024-11-25 ENCOUNTER — TELEPHONE (OUTPATIENT)
Dept: GASTROENTEROLOGY | Facility: CLINIC | Age: 64
End: 2024-11-25
Payer: MEDICARE

## 2024-11-25 DIAGNOSIS — K74.60 LIVER CIRRHOSIS SECONDARY TO NASH: Primary | ICD-10-CM

## 2024-11-25 DIAGNOSIS — K75.81 LIVER CIRRHOSIS SECONDARY TO NASH: Primary | ICD-10-CM

## 2024-11-25 NOTE — TELEPHONE ENCOUNTER
----- Message from Karin Demarco sent at 11/25/2024  7:32 AM EST -----  Can you please let her know that her creatinine (kidney function) has really bumped up since starting the Lasix.  I want her to stop the Lasix  and spironolactone to give her kidneys some time to recover and have labs repeated in 1 week.

## 2024-11-29 ENCOUNTER — LAB (OUTPATIENT)
Dept: LAB | Facility: HOSPITAL | Age: 64
End: 2024-11-29
Payer: MEDICARE

## 2024-11-29 LAB
ANION GAP SERPL CALCULATED.3IONS-SCNC: 9.8 MMOL/L (ref 5–15)
BUN SERPL-MCNC: 19 MG/DL (ref 8–23)
BUN/CREAT SERPL: 19.4 (ref 7–25)
CALCIUM SPEC-SCNC: 8.8 MG/DL (ref 8.6–10.5)
CHLORIDE SERPL-SCNC: 107 MMOL/L (ref 98–107)
CO2 SERPL-SCNC: 23.2 MMOL/L (ref 22–29)
CREAT SERPL-MCNC: 0.98 MG/DL (ref 0.57–1)
EGFRCR SERPLBLD CKD-EPI 2021: 65.8 ML/MIN/1.73
GLUCOSE SERPL-MCNC: 132 MG/DL (ref 65–99)
POTASSIUM SERPL-SCNC: 4.6 MMOL/L (ref 3.5–5.2)
SODIUM SERPL-SCNC: 140 MMOL/L (ref 136–145)

## 2024-11-29 PROCEDURE — 36415 COLL VENOUS BLD VENIPUNCTURE: CPT | Performed by: NURSE PRACTITIONER

## 2024-11-29 PROCEDURE — 80048 BASIC METABOLIC PNL TOTAL CA: CPT | Performed by: NURSE PRACTITIONER

## 2024-12-02 DIAGNOSIS — K74.60 LIVER CIRRHOSIS SECONDARY TO NASH: Primary | ICD-10-CM

## 2024-12-02 DIAGNOSIS — K75.81 LIVER CIRRHOSIS SECONDARY TO NASH: Primary | ICD-10-CM

## 2024-12-10 ENCOUNTER — LAB (OUTPATIENT)
Dept: LAB | Facility: HOSPITAL | Age: 64
End: 2024-12-10
Payer: MEDICARE

## 2024-12-10 PROCEDURE — 80048 BASIC METABOLIC PNL TOTAL CA: CPT | Performed by: NURSE PRACTITIONER

## 2024-12-11 LAB
ANION GAP SERPL CALCULATED.3IONS-SCNC: 6.6 MMOL/L (ref 5–15)
BUN SERPL-MCNC: 10 MG/DL (ref 8–23)
BUN/CREAT SERPL: 11.6 (ref 7–25)
CALCIUM SPEC-SCNC: 9.3 MG/DL (ref 8.6–10.5)
CHLORIDE SERPL-SCNC: 104 MMOL/L (ref 98–107)
CO2 SERPL-SCNC: 25.4 MMOL/L (ref 22–29)
CREAT SERPL-MCNC: 0.86 MG/DL (ref 0.57–1)
EGFRCR SERPLBLD CKD-EPI 2021: 76.5 ML/MIN/1.73
GLUCOSE SERPL-MCNC: 111 MG/DL (ref 65–99)
POTASSIUM SERPL-SCNC: 4.3 MMOL/L (ref 3.5–5.2)
SODIUM SERPL-SCNC: 136 MMOL/L (ref 136–145)

## 2025-03-20 ENCOUNTER — OFFICE VISIT (OUTPATIENT)
Dept: ORTHOPEDIC SURGERY | Facility: CLINIC | Age: 65
End: 2025-03-20
Payer: MEDICARE

## 2025-03-20 VITALS
HEIGHT: 62 IN | BODY MASS INDEX: 34.04 KG/M2 | DIASTOLIC BLOOD PRESSURE: 78 MMHG | SYSTOLIC BLOOD PRESSURE: 128 MMHG | WEIGHT: 185 LBS

## 2025-03-20 DIAGNOSIS — Z96.652 S/P TKR (TOTAL KNEE REPLACEMENT), LEFT: Primary | ICD-10-CM

## 2025-03-20 RX ORDER — FUROSEMIDE 20 MG/1
TABLET ORAL
COMMUNITY
Start: 2025-03-18

## 2025-03-20 RX ORDER — ALPRAZOLAM 0.25 MG
TABLET ORAL
COMMUNITY
Start: 2025-02-04
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
## 2025-03-27 ENCOUNTER — TELEPHONE (OUTPATIENT)
Dept: NEUROLOGY | Facility: CLINIC | Age: 65
End: 2025-03-27
Payer: MEDICARE

## 2025-03-27 NOTE — TELEPHONE ENCOUNTER
"  Caller: Jeevan Cardoso \"GREG\"    Relationship: Self    Best call back number: [phone]     What was the call regarding: THE PATIENT WAS TO SWITCH TO DR LEMA BUT SHE ASKS IF SHE IS ABLE TO COME BACK TO [facility] FOR HER MIGRAINE CARE.    PLEASE ADVISE  THANK YOU   "

## 2025-06-03 ENCOUNTER — TRANSCRIBE ORDERS (OUTPATIENT)
Dept: ADMINISTRATIVE | Facility: HOSPITAL | Age: 65
End: 2025-06-03
Payer: MEDICARE

## 2025-06-03 ENCOUNTER — TELEPHONE (OUTPATIENT)
Dept: ORTHOPEDIC SURGERY | Facility: CLINIC | Age: 65
End: 2025-06-03
Payer: MEDICARE

## 2025-06-03 DIAGNOSIS — M25.562 ACUTE PAIN OF LEFT KNEE: Primary | ICD-10-CM

## 2025-06-03 DIAGNOSIS — L02.416 CUTANEOUS ABSCESS OF LEFT LOWER EXTREMITY: Primary | ICD-10-CM

## 2025-06-03 NOTE — TELEPHONE ENCOUNTER
"AKHIL    Received call from patient (had L TKR with MRL in March), informing she now has an \"abscess on the outside of surgical knee.\" Per patient, she is also having mild pain, but denies any other symptoms. Patient is also requesting to possibly have an order placed for an ultrasound, to see where the infection and the growth (abscess) may be coming from. Please advise.     Thank you kindly.   "

## 2025-06-03 NOTE — TELEPHONE ENCOUNTER
Spoke with the patient and her . Patient states a bump that was on her left knee which was her operative knee with Dr. Buck had gotten bigger since her last visit in March. The skin over the bump broke open and blood/fluid came out. She was sent to a Dermatologist (Dr. Tommie Lynn). The doctor did an aspiration she was placed on antibiotics. Dr. Lynn wanted to get a tissue sample and a fluid sample. He took out a quarter sized amount of tissue. He stitched up the sides of the incision and left the center open to drain. Patient states it is no longer draining. Dr. Lynn recommended an ultrasound but she hasn't been able to get that ordered. Patient sent pictures (attached to message) and Dr. Buck needs to see patient in clinics. She is scheduled Thursday 6/5/25 2:20PM.

## 2025-06-04 ENCOUNTER — LAB (OUTPATIENT)
Dept: LAB | Facility: HOSPITAL | Age: 65
End: 2025-06-04
Payer: MEDICARE

## 2025-06-04 DIAGNOSIS — M25.562 ACUTE PAIN OF LEFT KNEE: ICD-10-CM

## 2025-06-04 LAB
BASOPHILS # BLD AUTO: 0.04 10*3/MM3 (ref 0–0.2)
BASOPHILS NFR BLD AUTO: 0.8 % (ref 0–1.5)
CRP SERPL-MCNC: 1.56 MG/DL (ref 0–0.5)
DEPRECATED RDW RBC AUTO: 57.4 FL (ref 37–54)
EOSINOPHIL # BLD AUTO: 0.22 10*3/MM3 (ref 0–0.4)
EOSINOPHIL NFR BLD AUTO: 4.7 % (ref 0.3–6.2)
ERYTHROCYTE [DISTWIDTH] IN BLOOD BY AUTOMATED COUNT: 15.4 % (ref 12.3–15.4)
ERYTHROCYTE [SEDIMENTATION RATE] IN BLOOD: 52 MM/HR (ref 0–30)
HCT VFR BLD AUTO: 41.3 % (ref 34–46.6)
HGB BLD-MCNC: 13 G/DL (ref 12–15.9)
IMM GRANULOCYTES # BLD AUTO: 0.03 10*3/MM3 (ref 0–0.05)
IMM GRANULOCYTES NFR BLD AUTO: 0.6 % (ref 0–0.5)
LYMPHOCYTES # BLD AUTO: 0.76 10*3/MM3 (ref 0.7–3.1)
LYMPHOCYTES NFR BLD AUTO: 16.1 % (ref 19.6–45.3)
MCH RBC QN AUTO: 31.8 PG (ref 26.6–33)
MCHC RBC AUTO-ENTMCNC: 31.5 G/DL (ref 31.5–35.7)
MCV RBC AUTO: 101 FL (ref 79–97)
MONOCYTES # BLD AUTO: 0.47 10*3/MM3 (ref 0.1–0.9)
MONOCYTES NFR BLD AUTO: 10 % (ref 5–12)
NEUTROPHILS NFR BLD AUTO: 3.19 10*3/MM3 (ref 1.7–7)
NEUTROPHILS NFR BLD AUTO: 67.8 % (ref 42.7–76)
NRBC BLD AUTO-RTO: 0 /100 WBC (ref 0–0.2)
PLATELET # BLD AUTO: 114 10*3/MM3 (ref 140–450)
PMV BLD AUTO: 9.6 FL (ref 6–12)
RBC # BLD AUTO: 4.09 10*6/MM3 (ref 3.77–5.28)
WBC NRBC COR # BLD AUTO: 4.71 10*3/MM3 (ref 3.4–10.8)

## 2025-06-04 PROCEDURE — 36415 COLL VENOUS BLD VENIPUNCTURE: CPT

## 2025-06-04 PROCEDURE — 85652 RBC SED RATE AUTOMATED: CPT

## 2025-06-04 PROCEDURE — 86140 C-REACTIVE PROTEIN: CPT

## 2025-06-04 PROCEDURE — 85025 COMPLETE CBC W/AUTO DIFF WBC: CPT

## 2025-06-05 ENCOUNTER — OFFICE VISIT (OUTPATIENT)
Dept: ORTHOPEDIC SURGERY | Facility: CLINIC | Age: 65
End: 2025-06-05
Payer: MEDICARE

## 2025-06-05 VITALS — HEIGHT: 62 IN | BODY MASS INDEX: 29.44 KG/M2 | HEART RATE: 64 BPM | OXYGEN SATURATION: 97 % | WEIGHT: 160 LBS

## 2025-06-05 DIAGNOSIS — Z96.652 PAIN DUE TO TOTAL LEFT KNEE REPLACEMENT, INITIAL ENCOUNTER: Primary | ICD-10-CM

## 2025-06-05 DIAGNOSIS — L02.416 CUTANEOUS ABSCESS OF LEFT LOWER EXTREMITY: ICD-10-CM

## 2025-06-05 DIAGNOSIS — T84.84XA PAIN DUE TO TOTAL LEFT KNEE REPLACEMENT, INITIAL ENCOUNTER: Primary | ICD-10-CM

## 2025-06-05 DIAGNOSIS — Z96.652 S/P TKR (TOTAL KNEE REPLACEMENT), LEFT: ICD-10-CM

## 2025-06-05 RX ORDER — MUPIROCIN 20 MG/G
OINTMENT TOPICAL
COMMUNITY
Start: 2025-05-22

## 2025-06-05 RX ORDER — DOXYCYCLINE 100 MG/1
CAPSULE ORAL
COMMUNITY
Start: 2025-05-24
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
## 2025-06-05 NOTE — PROGRESS NOTES
Procedure   - Large Joint Arthrocentesis: L knee synovasure aspiration (Left knee Synovasure aspiration) on 6/5/2025 3:45 PM  Indications: pain  Details: 18 G needle, anteromedial approach  Aspirate: 2.5 mL bloody; sent for lab analysis  Outcome: tolerated well, no immediate complications  Procedure, treatment alternatives, risks and benefits explained, specific risks discussed. Consent was given by the patient. Immediately prior to procedure a time out was called to verify the correct patient, procedure, equipment, support staff and site/side marked as required. Patient was prepped and draped in the usual sterile fashion.

## 2025-06-09 ENCOUNTER — TELEPHONE (OUTPATIENT)
Dept: ORTHOPEDIC SURGERY | Facility: CLINIC | Age: 65
End: 2025-06-09

## 2025-06-09 ENCOUNTER — PREP FOR SURGERY (OUTPATIENT)
Dept: OTHER | Facility: HOSPITAL | Age: 65
End: 2025-06-09
Payer: MEDICARE

## 2025-06-09 DIAGNOSIS — T84.54XA INFECTION ASSOCIATED WITH INTERNAL LEFT KNEE PROSTHESIS, INITIAL ENCOUNTER: Primary | ICD-10-CM

## 2025-06-09 RX ORDER — TRANEXAMIC ACID 10 MG/ML
1000 INJECTION, SOLUTION INTRAVENOUS ONCE
Status: CANCELLED | OUTPATIENT
Start: 2025-06-09 | End: 2025-06-09

## 2025-06-09 RX ORDER — ACETAMINOPHEN 500 MG
1000 TABLET ORAL ONCE
Status: CANCELLED | OUTPATIENT
Start: 2025-06-09 | End: 2025-06-09

## 2025-06-09 RX ORDER — PREGABALIN 75 MG/1
75 CAPSULE ORAL ONCE
Status: CANCELLED | OUTPATIENT
Start: 2025-06-09 | End: 2025-06-09

## 2025-06-09 NOTE — TELEPHONE ENCOUNTER
"Caller: Jeevan Cardoso \"GREG\"    Relationship to patient: Self    Best call back number: [phone]    Patient is needing: PATIENT STATES DR. LANDAVERDE CALLED HER SATURDAY AND DISCUSSED OPTIONS FOR TREATMENT AND SHE HAS QUESTIONS ABOUT INFECTIOUS DISEASE TREATMENT- PLEASE REACH OUT AND ADVISE           "

## 2025-06-10 ENCOUNTER — PRE-ADMISSION TESTING (OUTPATIENT)
Dept: PREADMISSION TESTING | Facility: HOSPITAL | Age: 65
DRG: 468 | End: 2025-06-10
Payer: MEDICARE

## 2025-06-10 VITALS — WEIGHT: 174.27 LBS | HEIGHT: 62 IN | BODY MASS INDEX: 32.07 KG/M2

## 2025-06-10 DIAGNOSIS — T84.54XA INFECTION ASSOCIATED WITH INTERNAL LEFT KNEE PROSTHESIS, INITIAL ENCOUNTER: ICD-10-CM

## 2025-06-10 LAB
ANION GAP SERPL CALCULATED.3IONS-SCNC: 5 MMOL/L (ref 5–15)
BASOPHILS # BLD AUTO: 0.02 10*3/MM3 (ref 0–0.2)
BASOPHILS NFR BLD AUTO: 0.5 % (ref 0–1.5)
BUN SERPL-MCNC: 13.8 MG/DL (ref 8–23)
BUN/CREAT SERPL: 15.2 (ref 7–25)
CALCIUM SPEC-SCNC: 9 MG/DL (ref 8.6–10.5)
CHLORIDE SERPL-SCNC: 102 MMOL/L (ref 98–107)
CO2 SERPL-SCNC: 29 MMOL/L (ref 22–29)
CREAT SERPL-MCNC: 0.91 MG/DL (ref 0.57–1)
DEPRECATED RDW RBC AUTO: 56.7 FL (ref 37–54)
EGFRCR SERPLBLD CKD-EPI 2021: 71.5 ML/MIN/1.73
EOSINOPHIL # BLD AUTO: 0.22 10*3/MM3 (ref 0–0.4)
EOSINOPHIL NFR BLD AUTO: 5.1 % (ref 0.3–6.2)
ERYTHROCYTE [DISTWIDTH] IN BLOOD BY AUTOMATED COUNT: 15.3 % (ref 12.3–15.4)
GLUCOSE SERPL-MCNC: 206 MG/DL (ref 65–99)
HBA1C MFR BLD: 4.9 % (ref 4.8–5.6)
HCT VFR BLD AUTO: 40.3 % (ref 34–46.6)
HGB BLD-MCNC: 13 G/DL (ref 12–15.9)
IMM GRANULOCYTES # BLD AUTO: 0.02 10*3/MM3 (ref 0–0.05)
IMM GRANULOCYTES NFR BLD AUTO: 0.5 % (ref 0–0.5)
INR PPP: 1.36 (ref 0.89–1.12)
LYMPHOCYTES # BLD AUTO: 1.01 10*3/MM3 (ref 0.7–3.1)
LYMPHOCYTES NFR BLD AUTO: 23.2 % (ref 19.6–45.3)
MCH RBC QN AUTO: 32.8 PG (ref 26.6–33)
MCHC RBC AUTO-ENTMCNC: 32.3 G/DL (ref 31.5–35.7)
MCV RBC AUTO: 101.8 FL (ref 79–97)
MONOCYTES # BLD AUTO: 0.5 10*3/MM3 (ref 0.1–0.9)
MONOCYTES NFR BLD AUTO: 11.5 % (ref 5–12)
NEUTROPHILS NFR BLD AUTO: 2.58 10*3/MM3 (ref 1.7–7)
NEUTROPHILS NFR BLD AUTO: 59.2 % (ref 42.7–76)
NRBC BLD AUTO-RTO: 0 /100 WBC (ref 0–0.2)
PLATELET # BLD AUTO: 91 10*3/MM3 (ref 140–450)
PMV BLD AUTO: 9.5 FL (ref 6–12)
POTASSIUM SERPL-SCNC: 4 MMOL/L (ref 3.5–5.2)
PROTHROMBIN TIME: 17.7 SECONDS (ref 12.2–15.3)
QT INTERVAL: 458 MS
QTC INTERVAL: 461 MS
RBC # BLD AUTO: 3.96 10*6/MM3 (ref 3.77–5.28)
SODIUM SERPL-SCNC: 136 MMOL/L (ref 136–145)
WBC NRBC COR # BLD AUTO: 4.35 10*3/MM3 (ref 3.4–10.8)

## 2025-06-10 PROCEDURE — 80048 BASIC METABOLIC PNL TOTAL CA: CPT

## 2025-06-10 PROCEDURE — 85610 PROTHROMBIN TIME: CPT

## 2025-06-10 PROCEDURE — 93010 ELECTROCARDIOGRAM REPORT: CPT | Performed by: INTERNAL MEDICINE

## 2025-06-10 PROCEDURE — 36415 COLL VENOUS BLD VENIPUNCTURE: CPT

## 2025-06-10 PROCEDURE — 83036 HEMOGLOBIN GLYCOSYLATED A1C: CPT

## 2025-06-10 PROCEDURE — 93005 ELECTROCARDIOGRAM TRACING: CPT

## 2025-06-10 PROCEDURE — G0480 DRUG TEST DEF 1-7 CLASSES: HCPCS

## 2025-06-10 PROCEDURE — 85025 COMPLETE CBC W/AUTO DIFF WBC: CPT

## 2025-06-10 NOTE — PAT
An arrival time for procedure was not provided during PAT visit. If patient had any questions or concerns about their arrival time, they were instructed to contact their surgeon/physician.  Additionally, if the patient referred to an arrival time that was acquired from their my chart account, patient was encouraged to verify that time with their surgeon/physician. Arrival times are NOT provided in Pre Admission Testing Department.    Patient instructed to drink 20 ounces of Gatorade or Gatorlyte (if diabetic) and it needs to be completed 1 hour (for Main OR patients) or 2 hours (scheduled  section & [facility] patients) before given arrival time for procedure (NO RED Gatorade and NO Gatorade Zero).    Patient verbalized understanding.    Patient to apply Chlorhexadine wipes  to surgical area (as instructed) the night before procedure and the AM of procedure. Wipes provided.    Discussed with patient options for receiving total joint replacement education and assessed patient's ability and preference. Joint Replacement Guide given to patient during PAT visit since not received a copy within the last year. Encouraged patient/family to read guide thoroughly and notify PAT staff with any questions or concerns. Handout provided directing patient to links to watch online videos related to joint replacement surgery on the [employer] website. The handout gives detailed instructions for joining an online joint replacement class through Microsoft Teams or phone conference offered on the  and  of the month. Patient agreed to participate by watching videos online. Patient verbalized understanding of instructions. Encouraged to share information with family and/or . An overview of the joint replacement education was provided during the visit including general perioperative instructions that are routine for all surgical patients (PAT PASS, wipes, directions to pre-op, etc.).

## 2025-06-12 ENCOUNTER — ANESTHESIA EVENT (OUTPATIENT)
Dept: PERIOP | Facility: HOSPITAL | Age: 65
End: 2025-06-12
Payer: MEDICARE

## 2025-06-12 ENCOUNTER — OFFICE VISIT (OUTPATIENT)
Dept: ORTHOPEDIC SURGERY | Facility: CLINIC | Age: 65
End: 2025-06-12
Payer: MEDICARE

## 2025-06-12 VITALS
SYSTOLIC BLOOD PRESSURE: 116 MMHG | HEIGHT: 62 IN | BODY MASS INDEX: 32.02 KG/M2 | DIASTOLIC BLOOD PRESSURE: 66 MMHG | WEIGHT: 174 LBS

## 2025-06-12 DIAGNOSIS — T84.54XA INFECTION ASSOCIATED WITH INTERNAL LEFT KNEE PROSTHESIS, INITIAL ENCOUNTER: Primary | ICD-10-CM
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
## 2025-06-13 ENCOUNTER — ANESTHESIA EVENT CONVERTED (OUTPATIENT)
Dept: ANESTHESIOLOGY | Facility: HOSPITAL | Age: 65
DRG: 468 | End: 2025-06-13
Payer: MEDICARE

## 2025-06-13 ENCOUNTER — HOSPITAL ENCOUNTER (INPATIENT)
Facility: HOSPITAL | Age: 65
LOS: 3 days | Discharge: HOME | DRG: 468 | End: 2025-06-16
Attending: ORTHOPAEDIC SURGERY | Admitting: ORTHOPAEDIC SURGERY
Payer: MEDICARE

## 2025-06-13 ENCOUNTER — APPOINTMENT (OUTPATIENT)
Dept: GENERAL RADIOLOGY | Facility: HOSPITAL | Age: 65
DRG: 468 | End: 2025-06-13
Payer: MEDICARE

## 2025-06-13 ENCOUNTER — ANESTHESIA (OUTPATIENT)
Dept: PERIOP | Facility: HOSPITAL | Age: 65
End: 2025-06-13
Payer: MEDICARE

## 2025-06-13 DIAGNOSIS — Z96.652 HISTORY OF REVISION OF TOTAL REPLACEMENT OF LEFT KNEE JOINT: Primary | ICD-10-CM

## 2025-06-13 DIAGNOSIS — T84.54XA INFECTION ASSOCIATED WITH INTERNAL LEFT KNEE PROSTHESIS, INITIAL ENCOUNTER: ICD-10-CM

## 2025-06-13 LAB
CK SERPL-CCNC: 131 U/L (ref 20–180)
GLUCOSE BLDC GLUCOMTR-MCNC: 138 MG/DL (ref 70–130)
GLUCOSE BLDC GLUCOMTR-MCNC: 99 MG/DL (ref 70–130)
POTASSIUM SERPL-SCNC: 4.2 MMOL/L (ref 3.5–5.2)

## 2025-06-13 PROCEDURE — 27488 REMOVAL OF KNEE PROSTHESIS: CPT

## 2025-06-13 PROCEDURE — 25010000002 VANCOMYCIN HCL 1.25 G RECONSTITUTED SOLUTION 1 EACH VIAL: Performed by: ORTHOPAEDIC SURGERY

## 2025-06-13 PROCEDURE — 25010000002 DEXAMETHASONE PER 1 MG: Performed by: ANESTHESIOLOGY

## 2025-06-13 PROCEDURE — 0SRD0J9 REPLACEMENT OF LEFT KNEE JOINT WITH SYNTHETIC SUBSTITUTE, CEMENTED, OPEN APPROACH: ICD-10-PCS | Performed by: ORTHOPAEDIC SURGERY

## 2025-06-13 PROCEDURE — 25810000003 LACTATED RINGERS PER 1000 ML: Performed by: ANESTHESIOLOGY

## 2025-06-13 PROCEDURE — 25010000002 VANCOMYCIN 1 G RECONSTITUTED SOLUTION: Performed by: ORTHOPAEDIC SURGERY

## 2025-06-13 PROCEDURE — 25010000002 ONDANSETRON PER 1 MG: Performed by: ANESTHESIOLOGY

## 2025-06-13 PROCEDURE — 0KNR0ZZ RELEASE LEFT UPPER LEG MUSCLE, OPEN APPROACH: ICD-10-PCS | Performed by: ORTHOPAEDIC SURGERY

## 2025-06-13 PROCEDURE — 25010000002 CEFAZOLIN PER 500 MG: Performed by: ORTHOPAEDIC SURGERY

## 2025-06-13 PROCEDURE — 73560 X-RAY EXAM OF KNEE 1 OR 2: CPT

## 2025-06-13 PROCEDURE — 25010000003 TOBRAMYCIN PER 80 MG: Performed by: ORTHOPAEDIC SURGERY

## 2025-06-13 PROCEDURE — P9041 ALBUMIN (HUMAN),5%, 50ML: HCPCS | Performed by: ANESTHESIOLOGY

## 2025-06-13 PROCEDURE — 87206 SMEAR FLUORESCENT/ACID STAI: CPT | Performed by: ORTHOPAEDIC SURGERY

## 2025-06-13 PROCEDURE — 87176 TISSUE HOMOGENIZATION CULTR: CPT | Performed by: ORTHOPAEDIC SURGERY

## 2025-06-13 PROCEDURE — 25010000002 ROPIVACAINE HCL-NACL 0.2-0.9 % SOLUTION: Performed by: NURSE ANESTHETIST, CERTIFIED REGISTERED

## 2025-06-13 PROCEDURE — 87075 CULTR BACTERIA EXCEPT BLOOD: CPT | Performed by: ORTHOPAEDIC SURGERY

## 2025-06-13 PROCEDURE — 27488 REMOVAL OF KNEE PROSTHESIS: CPT | Performed by: ORTHOPAEDIC SURGERY

## 2025-06-13 PROCEDURE — 25010000002 ALBUMIN HUMAN 5% PER 50 ML: Performed by: ANESTHESIOLOGY

## 2025-06-13 PROCEDURE — 25010000002 LIDOCAINE PF 1% 1 % SOLUTION: Performed by: ANESTHESIOLOGY

## 2025-06-13 PROCEDURE — 25010000002 PROPOFOL 10 MG/ML EMULSION: Performed by: ANESTHESIOLOGY

## 2025-06-13 PROCEDURE — 27430 REVISION OF THIGH MUSCLES: CPT

## 2025-06-13 PROCEDURE — 87116 MYCOBACTERIA CULTURE: CPT | Performed by: ORTHOPAEDIC SURGERY

## 2025-06-13 PROCEDURE — 82948 REAGENT STRIP/BLOOD GLUCOSE: CPT

## 2025-06-13 PROCEDURE — 25010000002 DEXAMETHASONE SODIUM PHOSPHATE 10 MG/ML SOLUTION: Performed by: NURSE ANESTHETIST, CERTIFIED REGISTERED

## 2025-06-13 PROCEDURE — 25010000002 BUPIVACAINE (PF) 0.25 % SOLUTION: Performed by: ANESTHESIOLOGY

## 2025-06-13 PROCEDURE — 25010000002 BUPIVACAINE (PF) 0.5 % SOLUTION: Performed by: NURSE ANESTHETIST, CERTIFIED REGISTERED

## 2025-06-13 PROCEDURE — 25010000002 SUGAMMADEX 200 MG/2ML SOLUTION: Performed by: ANESTHESIOLOGY

## 2025-06-13 PROCEDURE — C1713 ANCHOR/SCREW BN/BN,TIS/BN: HCPCS | Performed by: ORTHOPAEDIC SURGERY

## 2025-06-13 PROCEDURE — 25010000002 HYDROMORPHONE 1 MG/ML SOLUTION: Performed by: ANESTHESIOLOGY

## 2025-06-13 PROCEDURE — 87186 SC STD MICRODIL/AGAR DIL: CPT | Performed by: ORTHOPAEDIC SURGERY

## 2025-06-13 PROCEDURE — 84132 ASSAY OF SERUM POTASSIUM: CPT | Performed by: ORTHOPAEDIC SURGERY

## 2025-06-13 PROCEDURE — 87205 SMEAR GRAM STAIN: CPT | Performed by: ORTHOPAEDIC SURGERY

## 2025-06-13 PROCEDURE — 25010000002 VANCOMYCIN 1 G RECONSTITUTED SOLUTION 1 EACH VIAL: Performed by: ORTHOPAEDIC SURGERY

## 2025-06-13 PROCEDURE — 27430 REVISION OF THIGH MUSCLES: CPT | Performed by: ORTHOPAEDIC SURGERY

## 2025-06-13 PROCEDURE — 87070 CULTURE OTHR SPECIMN AEROBIC: CPT | Performed by: ORTHOPAEDIC SURGERY

## 2025-06-13 PROCEDURE — 25810000003 SODIUM CHLORIDE 0.9 % SOLUTION 250 ML FLEX CONT: Performed by: ORTHOPAEDIC SURGERY

## 2025-06-13 PROCEDURE — 0SPD0JZ REMOVAL OF SYNTHETIC SUBSTITUTE FROM LEFT KNEE JOINT, OPEN APPROACH: ICD-10-PCS | Performed by: ORTHOPAEDIC SURGERY

## 2025-06-13 PROCEDURE — 97162 PT EVAL MOD COMPLEX 30 MIN: CPT

## 2025-06-13 PROCEDURE — 87077 CULTURE AEROBIC IDENTIFY: CPT | Performed by: ORTHOPAEDIC SURGERY

## 2025-06-13 PROCEDURE — 87102 FUNGUS ISOLATION CULTURE: CPT | Performed by: ORTHOPAEDIC SURGERY

## 2025-06-13 PROCEDURE — 82550 ASSAY OF CK (CPK): CPT | Performed by: INTERNAL MEDICINE

## 2025-06-13 PROCEDURE — 25010000002 CEFTRIAXONE PER 250 MG: Performed by: INTERNAL MEDICINE

## 2025-06-13 PROCEDURE — C1776 JOINT DEVICE (IMPLANTABLE): HCPCS | Performed by: ORTHOPAEDIC SURGERY

## 2025-06-13 DEVICE — STRATAFIX (SPIRAL PDS PLUS), BIDIRECTIONAL
Type: IMPLANTABLE DEVICE | Site: KNEE | Status: FUNCTIONAL
Brand: STRATAFIX

## 2025-06-13 DEVICE — CMT BONE PALACOS R HI/VISC 1X40: Type: IMPLANTABLE DEVICE | Site: KNEE | Status: FUNCTIONAL

## 2025-06-13 DEVICE — CRUCIATE RETAINING FEMORAL
Type: IMPLANTABLE DEVICE | Site: KNEE | Status: FUNCTIONAL
Brand: TRIATHLON

## 2025-06-13 DEVICE — TIBIAL COMPONENT
Type: IMPLANTABLE DEVICE | Site: KNEE | Status: FUNCTIONAL
Brand: TRIATHLON

## 2025-06-13 RX ORDER — LACTULOSE 10 G/15ML
20 SOLUTION ORAL DAILY
Status: DISCONTINUED | OUTPATIENT
Start: 2025-06-13 | End: 2025-06-14

## 2025-06-13 RX ORDER — DEXAMETHASONE SODIUM PHOSPHATE 10 MG/ML
INJECTION, SOLUTION INTRAMUSCULAR; INTRAVENOUS
Status: COMPLETED | OUTPATIENT
Start: 2025-06-13 | End: 2025-06-13

## 2025-06-13 RX ORDER — LIDOCAINE HYDROCHLORIDE 10 MG/ML
0.5 INJECTION, SOLUTION EPIDURAL; INFILTRATION; INTRACAUDAL; PERINEURAL ONCE AS NEEDED
Status: COMPLETED | OUTPATIENT
Start: 2025-06-13 | End: 2025-06-13

## 2025-06-13 RX ORDER — ONDANSETRON 4 MG/1
4 TABLET, ORALLY DISINTEGRATING ORAL EVERY 6 HOURS PRN
Status: DISCONTINUED | OUTPATIENT
Start: 2025-06-13 | End: 2025-06-16 | Stop reason: HOSPADM

## 2025-06-13 RX ORDER — MIDAZOLAM HYDROCHLORIDE 1 MG/ML
1 INJECTION, SOLUTION INTRAMUSCULAR; INTRAVENOUS
Status: DISCONTINUED | OUTPATIENT
Start: 2025-06-13 | End: 2025-06-13 | Stop reason: HOSPADM

## 2025-06-13 RX ORDER — BUPIVACAINE HYDROCHLORIDE 5 MG/ML
INJECTION, SOLUTION EPIDURAL; INTRACAUDAL; PERINEURAL
Status: COMPLETED | OUTPATIENT
Start: 2025-06-13 | End: 2025-06-13

## 2025-06-13 RX ORDER — SODIUM CHLORIDE 0.9 % (FLUSH) 0.9 %
3 SYRINGE (ML) INJECTION EVERY 12 HOURS SCHEDULED
Status: DISCONTINUED | OUTPATIENT
Start: 2025-06-13 | End: 2025-06-16 | Stop reason: HOSPADM

## 2025-06-13 RX ORDER — ONDANSETRON 2 MG/ML
4 INJECTION INTRAMUSCULAR; INTRAVENOUS EVERY 6 HOURS PRN
Status: DISCONTINUED | OUTPATIENT
Start: 2025-06-13 | End: 2025-06-16 | Stop reason: HOSPADM

## 2025-06-13 RX ORDER — BUPIVACAINE HCL/0.9 % NACL/PF 0.125 %
PLASTIC BAG, INJECTION (ML) EPIDURAL AS NEEDED
Status: DISCONTINUED | OUTPATIENT
Start: 2025-06-13 | End: 2025-06-13 | Stop reason: SURG

## 2025-06-13 RX ORDER — SODIUM CHLORIDE 0.9 % (FLUSH) 0.9 %
10 SYRINGE (ML) INJECTION AS NEEDED
Status: DISCONTINUED | OUTPATIENT
Start: 2025-06-13 | End: 2025-06-13 | Stop reason: HOSPADM

## 2025-06-13 RX ORDER — ALPRAZOLAM 0.25 MG
0.25 TABLET ORAL 3 TIMES DAILY PRN
Status: DISCONTINUED | OUTPATIENT
Start: 2025-06-13 | End: 2025-06-16 | Stop reason: HOSPADM

## 2025-06-13 RX ORDER — FUROSEMIDE 20 MG/1
20 TABLET ORAL DAILY
Status: DISCONTINUED | OUTPATIENT
Start: 2025-06-14 | End: 2025-06-16 | Stop reason: HOSPADM

## 2025-06-13 RX ORDER — BUPIVACAINE HYDROCHLORIDE 2.5 MG/ML
INJECTION, SOLUTION EPIDURAL; INFILTRATION; INTRACAUDAL; PERINEURAL
Status: COMPLETED | OUTPATIENT
Start: 2025-06-13 | End: 2025-06-13

## 2025-06-13 RX ORDER — ROPIVACAINE HYDROCHLORIDE 2 MG/ML
INJECTION, SOLUTION EPIDURAL; INFILTRATION; PERINEURAL CONTINUOUS
Status: DISCONTINUED | OUTPATIENT
Start: 2025-06-13 | End: 2025-06-16 | Stop reason: HOSPADM

## 2025-06-13 RX ORDER — FAMOTIDINE 20 MG/1
20 TABLET, FILM COATED ORAL ONCE
Status: COMPLETED | OUTPATIENT
Start: 2025-06-13 | End: 2025-06-13

## 2025-06-13 RX ORDER — FENTANYL CITRATE 50 UG/ML
50 INJECTION, SOLUTION INTRAMUSCULAR; INTRAVENOUS
Status: DISCONTINUED | OUTPATIENT
Start: 2025-06-13 | End: 2025-06-13 | Stop reason: HOSPADM

## 2025-06-13 RX ORDER — PREGABALIN 75 MG/1
75 CAPSULE ORAL ONCE
Status: DISCONTINUED | OUTPATIENT
Start: 2025-06-13 | End: 2025-06-13 | Stop reason: HOSPADM

## 2025-06-13 RX ORDER — MAGNESIUM HYDROXIDE 1200 MG/15ML
LIQUID ORAL AS NEEDED
Status: DISCONTINUED | OUTPATIENT
Start: 2025-06-13 | End: 2025-06-13 | Stop reason: HOSPADM

## 2025-06-13 RX ORDER — HYDROMORPHONE HYDROCHLORIDE 1 MG/ML
0.5 INJECTION, SOLUTION INTRAMUSCULAR; INTRAVENOUS; SUBCUTANEOUS
Status: DISCONTINUED | OUTPATIENT
Start: 2025-06-13 | End: 2025-06-13 | Stop reason: HOSPADM

## 2025-06-13 RX ORDER — LIDOCAINE HYDROCHLORIDE 10 MG/ML
INJECTION, SOLUTION EPIDURAL; INFILTRATION; INTRACAUDAL; PERINEURAL AS NEEDED
Status: DISCONTINUED | OUTPATIENT
Start: 2025-06-13 | End: 2025-06-13 | Stop reason: SURG

## 2025-06-13 RX ORDER — SODIUM CHLORIDE 9 MG/ML
INJECTION, SOLUTION INTRAVENOUS
Status: DISPENSED
Start: 2025-06-13 | End: 2025-06-14

## 2025-06-13 RX ORDER — SODIUM CHLORIDE 0.9 % (FLUSH) 0.9 %
3-10 SYRINGE (ML) INJECTION AS NEEDED
Status: DISCONTINUED | OUTPATIENT
Start: 2025-06-13 | End: 2025-06-16 | Stop reason: HOSPADM

## 2025-06-13 RX ORDER — SODIUM HYPOCHLORITE 1.25 MG/ML
SOLUTION TOPICAL AS NEEDED
Status: DISCONTINUED | OUTPATIENT
Start: 2025-06-13 | End: 2025-06-13 | Stop reason: HOSPADM

## 2025-06-13 RX ORDER — TOBRAMYCIN 1.2 G/30ML
INJECTION, POWDER, LYOPHILIZED, FOR SOLUTION INTRAVENOUS AS NEEDED
Status: DISCONTINUED | OUTPATIENT
Start: 2025-06-13 | End: 2025-06-13 | Stop reason: HOSPADM

## 2025-06-13 RX ORDER — ROCURONIUM BROMIDE 10 MG/ML
INJECTION, SOLUTION INTRAVENOUS AS NEEDED
Status: DISCONTINUED | OUTPATIENT
Start: 2025-06-13 | End: 2025-06-13 | Stop reason: SURG

## 2025-06-13 RX ORDER — ACETAMINOPHEN 160 MG
TABLET,DISINTEGRATING ORAL AS NEEDED
Status: DISCONTINUED | OUTPATIENT
Start: 2025-06-13 | End: 2025-06-13 | Stop reason: HOSPADM

## 2025-06-13 RX ORDER — ASPIRIN 81 MG/1
81 TABLET ORAL EVERY 12 HOURS SCHEDULED
Status: DISCONTINUED | OUTPATIENT
Start: 2025-06-14 | End: 2025-06-14

## 2025-06-13 RX ORDER — ALBUMIN HUMAN 50 G/1000ML
SOLUTION INTRAVENOUS CONTINUOUS PRN
Status: DISCONTINUED | OUTPATIENT
Start: 2025-06-13 | End: 2025-06-13 | Stop reason: SURG

## 2025-06-13 RX ORDER — TRANEXAMIC ACID 10 MG/ML
1000 INJECTION, SOLUTION INTRAVENOUS ONCE
Status: COMPLETED | OUTPATIENT
Start: 2025-06-13 | End: 2025-06-13

## 2025-06-13 RX ORDER — FAMOTIDINE 10 MG/ML
20 INJECTION, SOLUTION INTRAVENOUS ONCE
Status: DISCONTINUED | OUTPATIENT
Start: 2025-06-13 | End: 2025-06-13 | Stop reason: HOSPADM

## 2025-06-13 RX ORDER — PANTOPRAZOLE SODIUM 40 MG/1
40 TABLET, DELAYED RELEASE ORAL 2 TIMES DAILY
Status: DISCONTINUED | OUTPATIENT
Start: 2025-06-13 | End: 2025-06-16 | Stop reason: HOSPADM

## 2025-06-13 RX ORDER — NADOLOL 20 MG/1
40 TABLET ORAL DAILY
Status: DISCONTINUED | OUTPATIENT
Start: 2025-06-13 | End: 2025-06-14

## 2025-06-13 RX ORDER — VANCOMYCIN 1.75 GRAM/500 ML IN 0.9 % SODIUM CHLORIDE INTRAVENOUS
1750 EVERY 24 HOURS
Status: DISCONTINUED | OUTPATIENT
Start: 2025-06-14 | End: 2025-06-14

## 2025-06-13 RX ORDER — ACETAMINOPHEN 500 MG
1000 TABLET ORAL ONCE
Status: DISCONTINUED | OUTPATIENT
Start: 2025-06-13 | End: 2025-06-13 | Stop reason: HOSPADM

## 2025-06-13 RX ORDER — ALBUTEROL SULFATE 0.83 MG/ML
2.5 SOLUTION RESPIRATORY (INHALATION) EVERY 6 HOURS PRN
Status: DISCONTINUED | OUTPATIENT
Start: 2025-06-13 | End: 2025-06-16 | Stop reason: HOSPADM

## 2025-06-13 RX ORDER — PROPOFOL 10 MG/ML
VIAL (ML) INTRAVENOUS AS NEEDED
Status: DISCONTINUED | OUTPATIENT
Start: 2025-06-13 | End: 2025-06-13 | Stop reason: SURG

## 2025-06-13 RX ORDER — OXYCODONE HYDROCHLORIDE 5 MG/1
5 TABLET ORAL EVERY 4 HOURS PRN
Status: DISCONTINUED | OUTPATIENT
Start: 2025-06-13 | End: 2025-06-16 | Stop reason: HOSPADM

## 2025-06-13 RX ORDER — CEFAZOLIN SODIUM 1 G/3ML
INJECTION, POWDER, FOR SOLUTION INTRAMUSCULAR; INTRAVENOUS
Status: DISPENSED
Start: 2025-06-13 | End: 2025-06-14

## 2025-06-13 RX ORDER — OXYCODONE HYDROCHLORIDE 10 MG/1
10 TABLET ORAL EVERY 4 HOURS PRN
Status: DISCONTINUED | OUTPATIENT
Start: 2025-06-13 | End: 2025-06-16 | Stop reason: HOSPADM

## 2025-06-13 RX ORDER — ONDANSETRON 2 MG/ML
4 INJECTION INTRAMUSCULAR; INTRAVENOUS ONCE AS NEEDED
Status: DISCONTINUED | OUTPATIENT
Start: 2025-06-13 | End: 2025-06-13 | Stop reason: HOSPADM

## 2025-06-13 RX ORDER — HYDROMORPHONE HYDROCHLORIDE 1 MG/ML
0.5 INJECTION, SOLUTION INTRAMUSCULAR; INTRAVENOUS; SUBCUTANEOUS
Status: DISCONTINUED | OUTPATIENT
Start: 2025-06-13 | End: 2025-06-16 | Stop reason: HOSPADM

## 2025-06-13 RX ORDER — VANCOMYCIN HYDROCHLORIDE 1 G/20ML
INJECTION, POWDER, LYOPHILIZED, FOR SOLUTION INTRAVENOUS AS NEEDED
Status: DISCONTINUED | OUTPATIENT
Start: 2025-06-13 | End: 2025-06-13 | Stop reason: HOSPADM

## 2025-06-13 RX ORDER — ACETAMINOPHEN 500 MG
1000 TABLET ORAL EVERY 8 HOURS
Status: DISCONTINUED | OUTPATIENT
Start: 2025-06-13 | End: 2025-06-14

## 2025-06-13 RX ORDER — METOCLOPRAMIDE 5 MG/1
5 TABLET ORAL 2 TIMES DAILY
Status: DISCONTINUED | OUTPATIENT
Start: 2025-06-13 | End: 2025-06-16 | Stop reason: HOSPADM

## 2025-06-13 RX ORDER — DEXAMETHASONE SODIUM PHOSPHATE 4 MG/ML
INJECTION, SOLUTION INTRA-ARTICULAR; INTRALESIONAL; INTRAMUSCULAR; INTRAVENOUS; SOFT TISSUE AS NEEDED
Status: DISCONTINUED | OUTPATIENT
Start: 2025-06-13 | End: 2025-06-13 | Stop reason: SURG

## 2025-06-13 RX ORDER — SODIUM CHLORIDE, SODIUM LACTATE, POTASSIUM CHLORIDE, CALCIUM CHLORIDE 600; 310; 30; 20 MG/100ML; MG/100ML; MG/100ML; MG/100ML
9 INJECTION, SOLUTION INTRAVENOUS CONTINUOUS
Status: ACTIVE | OUTPATIENT
Start: 2025-06-14 | End: 2025-06-14

## 2025-06-13 RX ORDER — SPIRONOLACTONE 50 MG/1
50 TABLET, FILM COATED ORAL DAILY
Status: DISCONTINUED | OUTPATIENT
Start: 2025-06-14 | End: 2025-06-16 | Stop reason: HOSPADM

## 2025-06-13 RX ORDER — NALOXONE HCL 0.4 MG/ML
0.1 VIAL (ML) INJECTION
Status: DISCONTINUED | OUTPATIENT
Start: 2025-06-13 | End: 2025-06-16 | Stop reason: HOSPADM

## 2025-06-13 RX ORDER — ONDANSETRON 2 MG/ML
INJECTION INTRAMUSCULAR; INTRAVENOUS AS NEEDED
Status: DISCONTINUED | OUTPATIENT
Start: 2025-06-13 | End: 2025-06-13 | Stop reason: SURG

## 2025-06-13 RX ORDER — LABETALOL HYDROCHLORIDE 5 MG/ML
10 INJECTION, SOLUTION INTRAVENOUS EVERY 4 HOURS PRN
Status: DISCONTINUED | OUTPATIENT
Start: 2025-06-13 | End: 2025-06-16 | Stop reason: HOSPADM

## 2025-06-13 RX ORDER — SODIUM CHLORIDE 0.9 % (FLUSH) 0.9 %
10 SYRINGE (ML) INJECTION EVERY 12 HOURS SCHEDULED
Status: DISCONTINUED | OUTPATIENT
Start: 2025-06-13 | End: 2025-06-13 | Stop reason: HOSPADM

## 2025-06-13 RX ADMIN — BUPIVACAINE HYDROCHLORIDE 30 ML: 2.5 INJECTION, SOLUTION EPIDURAL; INFILTRATION; INTRACAUDAL; PERINEURAL at 13:38

## 2025-06-13 RX ADMIN — HYDROMORPHONE HYDROCHLORIDE 0.25 MG: 1 INJECTION, SOLUTION INTRAMUSCULAR; INTRAVENOUS; SUBCUTANEOUS at 12:50

## 2025-06-13 RX ADMIN — DEXAMETHASONE SODIUM PHOSPHATE 2 MG: 10 INJECTION, SOLUTION INTRAMUSCULAR; INTRAVENOUS at 13:38

## 2025-06-13 RX ADMIN — Medication 3 ML: at 21:09

## 2025-06-13 RX ADMIN — Medication 1000 MG: at 14:20

## 2025-06-13 RX ADMIN — VANCOMYCIN HYDROCHLORIDE 1000 MG: 1 INJECTION, POWDER, LYOPHILIZED, FOR SOLUTION INTRAVENOUS at 09:06

## 2025-06-13 RX ADMIN — VANCOMYCIN HYDROCHLORIDE 1250 MG: 1.25 INJECTION, POWDER, LYOPHILIZED, FOR SOLUTION INTRAVENOUS at 21:08

## 2025-06-13 RX ADMIN — BUPIVACAINE HYDROCHLORIDE 30 ML: 5 INJECTION, SOLUTION EPIDURAL; INTRACAUDAL; PERINEURAL at 10:35

## 2025-06-13 RX ADMIN — OXYCODONE 5 MG: 5 TABLET ORAL at 18:36

## 2025-06-13 RX ADMIN — LIDOCAINE HYDROCHLORIDE 50 MG: 10 INJECTION, SOLUTION EPIDURAL; INFILTRATION; INTRACAUDAL; PERINEURAL at 10:26

## 2025-06-13 RX ADMIN — PROPOFOL 130 MG: 10 INJECTION, EMULSION INTRAVENOUS at 10:28

## 2025-06-13 RX ADMIN — DEXAMETHASONE SODIUM PHOSPHATE 2 MG: 10 INJECTION INTRAMUSCULAR; INTRAVENOUS at 10:35

## 2025-06-13 RX ADMIN — HYDROMORPHONE HYDROCHLORIDE 0.5 MG: 1 INJECTION, SOLUTION INTRAMUSCULAR; INTRAVENOUS; SUBCUTANEOUS at 13:18

## 2025-06-13 RX ADMIN — CEFAZOLIN 1000 MG: 1 INJECTION, POWDER, FOR SOLUTION INTRAMUSCULAR; INTRAVENOUS at 15:10

## 2025-06-13 RX ADMIN — SODIUM CHLORIDE, SODIUM LACTATE, POTASSIUM CHLORIDE, CALCIUM CHLORIDE 9 ML/HR: 20; 30; 600; 310 INJECTION, SOLUTION INTRAVENOUS at 09:20

## 2025-06-13 RX ADMIN — SODIUM CHLORIDE 2000 MG: 900 INJECTION INTRAVENOUS at 19:53

## 2025-06-13 RX ADMIN — ROCURONIUM BROMIDE 40 MG: 10 INJECTION INTRAVENOUS at 10:29

## 2025-06-13 RX ADMIN — SODIUM CHLORIDE, SODIUM LACTATE, POTASSIUM CHLORIDE, CALCIUM CHLORIDE: 20; 30; 600; 310 INJECTION, SOLUTION INTRAVENOUS at 12:57

## 2025-06-13 RX ADMIN — Medication 50 MCG: at 12:52

## 2025-06-13 RX ADMIN — ALBUMIN (HUMAN): 12.5 INJECTION, SOLUTION INTRAVENOUS at 10:50

## 2025-06-13 RX ADMIN — ONDANSETRON 4 MG: 2 INJECTION INTRAMUSCULAR; INTRAVENOUS at 12:48

## 2025-06-13 RX ADMIN — LIDOCAINE HYDROCHLORIDE 0.5 ML: 10 INJECTION, SOLUTION EPIDURAL; INFILTRATION; INTRACAUDAL; PERINEURAL at 09:00

## 2025-06-13 RX ADMIN — TRANEXAMIC ACID 1000 MG: 10 INJECTION, SOLUTION INTRAVENOUS at 12:42

## 2025-06-13 RX ADMIN — PANTOPRAZOLE SODIUM 40 MG: 40 TABLET, DELAYED RELEASE ORAL at 21:08

## 2025-06-13 RX ADMIN — TRANEXAMIC ACID 1000 MG: 10 INJECTION, SOLUTION INTRAVENOUS at 10:36

## 2025-06-13 RX ADMIN — NADOLOL 40 MG: 20 TABLET ORAL at 18:36

## 2025-06-13 RX ADMIN — DEXAMETHASONE SODIUM PHOSPHATE 4 MG: 4 INJECTION, SOLUTION INTRA-ARTICULAR; INTRALESIONAL; INTRAMUSCULAR; INTRAVENOUS; SOFT TISSUE at 10:37

## 2025-06-13 RX ADMIN — SODIUM CHLORIDE 2 G: 900 INJECTION INTRAVENOUS at 10:26

## 2025-06-13 RX ADMIN — SUGAMMADEX 200 MG: 100 INJECTION, SOLUTION INTRAVENOUS at 13:18

## 2025-06-13 RX ADMIN — FAMOTIDINE 20 MG: 20 TABLET, FILM COATED ORAL at 09:19

## 2025-06-13 RX ADMIN — HYDROMORPHONE HYDROCHLORIDE 0.25 MG: 1 INJECTION, SOLUTION INTRAMUSCULAR; INTRAVENOUS; SUBCUTANEOUS at 13:10

## 2025-06-13 RX ADMIN — ROCURONIUM BROMIDE 10 MG: 10 INJECTION INTRAVENOUS at 10:28

## 2025-06-13 RX ADMIN — LACTULOSE 20 G: 20 SOLUTION ORAL at 18:36

## 2025-06-13 NOTE — ANESTHESIA PROCEDURE NOTES
Airway  Reason: elective    Date/Time: 6/13/2025 10:31 AM  Airway not difficult    General Information and Staff    Patient location during procedure: OR  SRNA: Maryam Bermudez SRNA  Indications and Patient Condition  Indications for airway management: airway protection    Preoxygenated: yes  MILS not maintained throughout    Mask difficulty assessment: 1 - vent by mask    Final Airway Details    Final airway type: endotracheal airway      Successful airway: ETT  Cuffed: yes   Successful intubation technique: direct laryngoscopy  Adjuncts used in placement: intubating stylet  Endotracheal tube insertion site: oral  Blade: Anand  Blade size: 2  ETT size (mm): 7.0  Cormack-Lehane Classification: grade I - full view of glottis  Placement verified by: chest auscultation and capnometry   Cuff volume (mL): 10  Measured from: teeth  ETT/EBT  to teeth (cm): 21  Number of attempts at approach: 1  Assessment: lips, teeth, and gum same as pre-op and atraumatic intubation    Additional Comments  Negative epigastric sounds, Breath sound equal bilaterally with symmetric chest rise and fall         MICU DAILY GOALS     Family/Goals of care/Code Status   Code Status: DNR    24H Vital Sign Range  Temp:  [94.5 °F (34.7 °C)-96.1 °F (35.6 °C)]   Pulse:  [68-98]   Resp:  [16-23]   BP: ()/(38-77)   SpO2:  [85 %-100 %]   Arterial Line BP: (108-142)/(60-81)      Shift Events   No acute events throughout shift    AWAKE RASS: Goal - RASS Goal: 0-->alert and calm  Actual - RASS (Osorio Agitation-Sedation Scale): moderate sedation    Restraint necessity: Not necessary   BREATHE SBT: Not attempted    Coordinate A & B, analgesics/sedatives Pain: managed   SAT: Not attempted   Delirium CAM-ICU: Overall CAM-ICU: Positive   Early(intubated/ Progressive (non-intubated) Mobility MOVE Screen (INTUBATED ONLY): Not attempted    Activity: Activity Management: Patient unable to perform activities   Feeding/Nutrition Diet order: Diet/Nutrition Received: NPO, tube feeding, Specialty Diet/Nutrition Received: renal diet   Thrombus DVT prophylaxis: VTE Required Core Measure: Provider determined low risk VTE   HOB Elevation Head of Bed (HOB) Positioning: HOB at 20-30 degrees   Ulcer Prophylaxis GI: yes   Glucose control managed Glycemic Management: blood glucose monitored   Skin Skin assessed during: Q Shift Change    Sacrum intact? No  Heels intact? Yes  Surgical wound? No    [] No Altered Skin Integrity Present    [x]Prevention Measures Documented    [x] Altered Skin Integrity Present or Discovered   [x] LDA present /added in EPIC   [x] Wound Image Taken     Wound Care Consulted? Yes    Attending Nurse:  Licha Lagunas RN/Staff Member:  Yes   Bowel Function diarrhea    Indwelling Catheter Necessity [REMOVED]      Urethral Catheter 11/01/23 0009 Temperature probe 16 Fr.-Reason for Continuing Urinary Catheterization: Critically ill in ICU and requiring hourly monitoring of intake/output    Trialysis (Dialysis) Catheter 11/01/23 0656 left internal jugular-Line Necessity Review: CRRT/HD     De-escalation Antibiotics Yes       VS  and assessment per flow sheet, patient progressing towards goals as tolerated, plan of care reviewed with  team , all concerns addressed, will continue to monitor.

## 2025-06-13 NOTE — BRIEF OP NOTE
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, initial encounter [T84.54XA]    Procedure(s):      Procedure(s):  TOTAL KNEE ARTHROPLASTY REVISION    Surgical Approach: Knee Medial Parapatellar            Surgeon(s):  Olaf Buck MD    Anesthesia: Spinal    Staff:   Circulator: Celia Medrano RN; Sylvie Fermin RN  Scrub Person: Elizabeth Bell; Jorge Cornelius; Liz Linda  Vendor Representative: Mendez Wiley  Nursing Assistant: Neena Palafox  Assistant: Pete Soto PA-C  Other: Pau Lopez RN  Assistant: Pete Soto PA-C    Estimated Blood Loss: 250 mL    Urine Voided: * No values recorded between 6/13/2025 10:21 AM and 6/13/2025  1:18 PM *    Specimens:                Specimens       ID Source Type Tests Collected By Collected At Frozen?    1 Knee, Left Synovial Fluid FUNGAL CULTURE  BODY FLUID CULTURE  AFB CULTURE  ANAEROBIC CULTURE 10 DAY INCUBATION   Olaf Buck MD 6/13/25 1131     Description: SYNOVIAL FLUID LEFT KNEE    2 Knee, Left Synovium FUNGAL CULTURE  TISSUE / BONE CULTURE  AFB CULTURE  ANAEROBIC CULTURE 10 DAY INCUBATION   Olaf Buck MD 6/13/25 1134     Description: SYNOVIUM LEFT KNEE    3 Knee, Left Synovium FUNGAL CULTURE  TISSUE / BONE CULTURE  AFB CULTURE  ANAEROBIC CULTURE 10 DAY INCUBATION   Olaf Buck MD 6/13/25 1133     Description: POSTERIOR SYNOVIUM LEFT KNEE    4 Knee, Left Tissue FUNGAL CULTURE  TISSUE / BONE CULTURE  AFB CULTURE  ANAEROBIC CULTURE 10 DAY INCUBATION   Olaf Buck MD 6/13/25 1150     Description: LEFT KNEE SINUS TRACTS              Drains: * No LDAs found *    Findings: Infected left total knee arthroplasty with sinus tract from skin to anterior lateral joint  capsule      Complications: None apparent    Assistant: Pete Soto PA-C  was responsible for performing the following activities: Retraction, Suction, Irrigation, Suturing, Closing, and Placing Dressing and their skilled assistance was necessary for the success of this case.    Olaf Buck MD     Date: 6/13/2025  Time: 13:28 EDT
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
## 2025-06-13 NOTE — ANESTHESIA PROCEDURE NOTES
Peripheral Block      Patient reassessed immediately prior to procedure    Patient location during procedure: OR  Start time: 6/13/2025 1:37 PM  Reason for block: at surgeon's request and post-op pain management  Performed by  CRNA/ELLIOT: Home [place] CRNASRNA: Maryam Bermudez SRNA  Preanesthetic Checklist  Completed: patient identified, IV checked, site marked, risks and benefits discussed, surgical consent, monitors and equipment checked, pre-op evaluation and timeout performed  Prep:  Pt Position: supine  Sterile barriers:mask, cap, washed/disinfected hands and gloves  Prep: ChloraPrep  Patient monitoring: blood pressure monitoring, continuous pulse oximetry and EKG  Procedure    Guidance:ultrasound guided    ULTRASOUND INTERPRETATION.  Using ultrasound guidance a 20 G gauge needle was placed in close proximity to the nerve, at which point, under ultrasound guidance anesthetic was injected in the area of the nerve and spread of the anesthesia was seen on ultrasound in close proximity thereto.  There were no abnormalities seen on ultrasound; a digital image was taken; and the patient tolerated the procedure with no complications. Images:still images obtained, printed/placed on chart    Laterality:left  Anesthesia block type: Popliteal Plexus.  Injection Technique:single-shot  Needle Type:echogenic and short-bevel  Needle Gauge:20 G  Resistance on Injection: none    Medications Used: bupivacaine PF (MARCAINE) 0.25 % injection - Injection   30 mL - 6/13/2025 1:38:00 PM  dexamethasone sodium phosphate injection - Injection   2 mg - 6/13/2025 1:38:00 PM      Post Assessment  Injection Assessment: negative aspiration for heme, no paresthesia on injection and incremental injection  Patient Tolerance:comfortable throughout block  Complications:no  Additional Notes  A high-frequency linear transducer, with sterile cover, was placed on the anterior mid-thigh (between the anterior superior iliac spine and patella). The  "transducer was then moved medially to identify the Sartorius muscle (Nicolas), Vastus Medialis muscle (VMM), Superficial Femoral Artery (SFA) and Vein. The transducer was then moved caudad to position where the SFA is distal and posterior to the Nicolas (Adductor Hiatus). The insertion site was prepped and draped in sterile fashion. Skin and cutaneous tissue was infiltrated with 2-5 ml of 1% Lidocaine. Using ultrasound-guidance, a 20-gauge B-Gomez 4\" Ultraplex 360 non-stimulating echogenic needle was advanced in plane from lateral to medial. Preservative-free normal saline was utilized for hydro-dissection of tissue, and to confirm needle placement at the 12 o'clock position to the artery. Local anesthetic in incremental 3-5 ml injections. Aspiration every 5 ml to prevent intravascular injection. Injection was completed with negative aspiration of blood and negative intravascular injection. Injection pressures were normal with minimal resistance.   Performed by: Herson Power MD            "

## 2025-06-13 NOTE — ANESTHESIA POSTPROCEDURE EVALUATION
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, initial encounter [T84.54XA])    Surgeons: Olaf Buck MD Provider: Herson Power MD    Anesthesia Type: general ASA Status: 3            Anesthesia Type: general    Vitals  Vitals Value Taken Time   /74 06/13/25 14:17   Temp 97.6 °F (36.4 °C) 06/13/25 14:17   Pulse 60 06/13/25 14:17   Resp 11 06/13/25 14:17   SpO2 92 % 06/13/25 14:17           Post Anesthesia Care and Evaluation    Patient location during evaluation: PACU  Patient participation: complete - patient participated  Level of consciousness: sleepy but conscious  Pain score: 0  Pain management: adequate    Airway patency: patent  Anesthetic complications: No anesthetic complications  PONV Status: none  Cardiovascular status: hemodynamically stable and acceptable  Respiratory status: nonlabored ventilation, acceptable, nasal cannula and oral airway  Hydration status: acceptable

## 2025-06-13 NOTE — ANESTHESIA PROCEDURE NOTES
Peripheral IV    Patient location during procedure: pre-op  Line placed for Difficult Access and Fluids/Medication Admin.  Performed By GLO: Maryam Bermudez SRNA  Preanesthetic Checklist  Completed: patient identified, IV checked, site marked, risks and benefits discussed, surgical consent, monitors and equipment checked, pre-op evaluation and timeout performed  Peripheral IV Prep   Patient position: supine   Prep: ChloraPrep  Patient monitoring: heart rate, cardiac monitor and continuous pulse ox  Peripheral IV Procedure   Location:  Forearm  Catheter size: 20 G          Post Assessment   Dressing Type: tape and transparent.    IV Dressing/Site: clean, dry and intact
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
## 2025-06-13 NOTE — INTERVAL H&P NOTE
[facility] Pre-op    Full history and physical note from office is attached.    VS: /57  HR 68  RR 16  T 98.6  Sat 94%RA    Review of Systems:  Constitutional-- No fever, chills or sweats. No fatigue.  CV-- No chest pain, palpitation or syncope  Resp-- No SOB, cough, hemoptysis  Skin--No rashes or lesions    Physical Exam:  Heart:   Regular rate and rhythm, S1 and S2 normal  Lungs: Clear to auscultation bilaterally, respirations unlabored    LAB Results:  Lab Results   Component Value Date    WBC 4.35 06/10/2025    HGB 13.0 06/10/2025    HCT 40.3 06/10/2025    .8 (H) 06/10/2025    PLT 91 (L) 06/10/2025    NEUTROABS 2.58 06/10/2025    GLUCOSE 206 (H) 06/10/2025    BUN 13.8 06/10/2025    CREATININE 0.91 06/10/2025    EGFRIFNONA 60 (L) 07/20/2021    EGFRIFAFRI 72 07/20/2021     06/10/2025    K 4.0 06/10/2025     06/10/2025    CO2 29.0 06/10/2025    MG 1.5 (L) 06/28/2023    CALCIUM 9.0 06/10/2025    ALBUMIN 3.1 (L) 11/22/2024    AST 26 11/22/2024    ALT 8 11/22/2024    BILITOT 1.8 (H) 11/22/2024    PTT 28.7 02/07/2024    INR 1.36 (H) 06/10/2025       Cancer Staging (if applicable)  Cancer Patient: __ yes __no __unknown__N/A; If yes, clinical stage T:__ N:__M:__, stage group or __N/A      Impression: Infection of left prosthetic knee joint      Plan: TOTAL KNEE ARTHROPLASTY REVISION - left      Mary Grace Paulson, NGOC   6/13/2025   09:18 EDT

## 2025-06-13 NOTE — ANESTHESIA PREPROCEDURE EVALUATION
Anesthesia Evaluation     Patient summary reviewed and Nursing notes reviewed   NPO Solid Status: > 8 hours  NPO Liquid Status: > 8 hours           Airway   TM distance: >3 FB  Neck ROM: full  No difficulty expected  Dental - normal exam     Pulmonary - normal exam   Cardiovascular   Exercise tolerance: unable to assess    (+) murmur      Neuro/Psych  GI/Hepatic/Renal/Endo      Musculoskeletal     Abdominal    Substance History      OB/GYN          Other                    Anesthesia Plan    ASA 3     general     (Adductor canal block in pacu for post op pain control)    Anesthetic plan, risks, benefits, and alternatives have been provided, discussed and informed consent has been obtained with: patient.    CODE STATUS:

## 2025-06-13 NOTE — PROGRESS NOTES
Pharmacy Consult - Vancomycin Dosing and Monitoring    Jeevan Cardoso is a 64 y.o. female receiving vancomycin therapy.     Indication: Prosthetic knee infection  Consulting Provider: Russ Oavlle MD  ID Consult: Yes    Goal AUC: 400-600 mg/L*hr    Current Antimicrobial Therapy  Anti-Infectives (From admission, onward)      Ordered     Dose/Rate Route Frequency Start Stop    06/13/25 1644  Vancomycin HCl 1,250 mg in sodium chloride 0.9 % 250 mL VTB        Ordering Provider: Olaf Buck MD    15 mg/kg × 78.9 kg  200 mL/hr over 75 Minutes Intravenous Once 06/13/25 2100      06/13/25 1423  cefTRIAXone (ROCEPHIN) 2,000 mg in sodium chloride 0.9 % 100 mL MBP        Ordering Provider: Russ Ovalle MD    2,000 mg  200 mL/hr over 30 Minutes Intravenous Every 24 Hours 06/13/25 2000 07/13/25 1959    06/13/25 1644  ceFAZolin 2000 mg IVPB in 100 mL NS (MBP)  Status:  Discontinued        Ordering Provider: Olaf Buck MD    2 g  over 30 Minutes Intravenous Every 8 Hours 06/13/25 1730 06/13/25 1819    06/13/25 1503  ceFAZolin (ANCEF) 1 g injection  - ADS Override Pull        Note to Pharmacy: Created by cabinet override   Ordering Provider: Jorge Nuñez RN       06/13/25 1503 06/14/25 0303    06/13/25 1433  ceFAZolin 1000 mg IVPB in 100 mL NS (MBP)        Ordering Provider: Olaf Buck MD    1,000 mg  over 30 Minutes Intravenous Once 06/13/25 1435 06/13/25 1540    06/13/25 1123  tobramycin (NEBCIN) topical powder  Status:  Discontinued        Ordering Provider: Olaf Buck MD      As Needed 06/13/25 1123 06/13/25 1351    06/13/25 1114  vancomycin (VANCOCIN) powder  Status:  Discontinued        Ordering Provider: Olaf Buck MD      As Needed 06/13/25 1114 06/13/25 1351    06/13/25 0847  ceFAZolin 2000 mg IVPB in 100 mL NS (MBP)        Ordering Provider: Olaf Buck MD    2 g  over 30 Minutes Intravenous Once 06/13/25 0849 06/13/25 1026    06/13/25 0847  vancomycin  "(VANCOCIN) 1,000 mg in sodium chloride 0.9 % 250 mL IVPB-VTB        Ordering Provider: Olaf Buck MD    15 mg/kg × 72.6 kg  250 mL/hr over 60 Minutes Intravenous Once 06/13/25 0849 06/13/25 1015          Allergies  Allergies as of 06/09/2025 - Reviewed 06/05/2025   Allergen Reaction Noted    Omnicef [cefdinir] Anaphylaxis 09/07/2023    Penicillins Anaphylaxis 05/12/2016    Cephalosporins Itching and Rash 01/16/2000    Vancomycin Itching 05/12/2016    Acetaminophen Hives, Itching, and Rash 02/21/2024    Cefaclor Itching 05/12/2016    Hydrocodone Itching 05/12/2016    Oxycodone-acetaminophen Itching 05/12/2016    Pentazocine Itching 03/27/2013     Labs  Results from last 7 days   Lab Units 06/10/25  0913   BUN mg/dL 13.8   CREATININE mg/dL 0.91     Results from last 7 days   Lab Units 06/10/25  0913   WBC 10*3/mm3 4.35     Evaluation of Dosing     Last Dose Received in the ED/Outside Facility: Vancomycin 1000 mg x 1 @ 0906, vancomycin 1250 mg x 1 due at 2100 for post-op prophylaxis  Is Patient on Dialysis or Renal Replacement: No    Height - 157.5 cm (62\")  Weight - 78.9 kg (174 lb)    Estimated Creatinine Clearance: 62.3 mL/min (by C-G formula based on SCr of 0.91 mg/dL).    No intake/output data recorded.    Microbiology and Radiology  Microbiology Results (last 10 days)       Procedure Component Value - Date/Time    Tissue / Bone Culture - Tissue, Knee, Left [[id number]] Collected: 06/13/25 1150    Lab Status: Preliminary result Specimen: Tissue from Knee, Left Updated: 06/13/25 1539     Gram Stain Moderate (3+) WBCs seen      No organisms seen    Tissue / Bone Culture - Synovium, Knee, Left [[id number]] Collected: 06/13/25 1134    Lab Status: Preliminary result Specimen: Synovium from Knee, Left Updated: 06/13/25 1513     Gram Stain No WBCs or organisms seen            InsightRX AUC Calculation:    Current AUC: -- mg/L*hr    Predicted Steady State AUC on Current Dose: -- " mg/L*hr  _________________________________    Predicted Steady State AUC on New Dose: 519 mg/L*hr    Assessment/Plan:     Pharmacy consulted to dose vancomycin for prosthetic knee infection, goal -600 mg/L*hr.  Patient will have received two surgical prophylaxis doses of vancomycin at 15 mg/kg within the past 24 hours. Do not believe it is necessary to give the patient a loading dose, so will go ahead and schedule maintenance regimen.   Culture data is pending.  Serum creatinine is 0.91, BUN is 13.8, and WBC is 4.35 (all as of 6/10, updated labs to be drawn tomorrow).  Based on evaluation, initiate a maintenance regimen of vancomycin 1750 mg ( ~ 22 mg/kg) IV q24h beginning 6/14 @ 2100.  A vancomycin random will be assessed on 6/15 with AM labs. Could consider ordering a trough 30 minutes - 1 hour prior to 6/17 or 6/18 dose to assess for stated goal trough of 12-18.   Will continue to follow and adjust vancomycin dose as needed based on renal function, cultures, and patient clinical status.    Thank you,      Vincent Acharya [facility]  6/13/2025  18:22 EDT

## 2025-06-13 NOTE — ANESTHESIA PROCEDURE NOTES
Peripheral Block      Patient reassessed immediately prior to procedure    Patient location during procedure: OR  Start time: 6/13/2025 1:53 PM  Reason for block: at surgeon's request and post-op pain management  Performed by  CRNA/CAA: Home Sotelo, CRNA  Assisted by: Fernanda Villanueva RN  Preanesthetic Checklist  Completed: patient identified, IV checked, site marked, risks and benefits discussed, surgical consent, monitors and equipment checked, pre-op evaluation and timeout performed  Prep:  Pt Position: supine  Sterile barriers:cap, gloves, mask, sterile barriers and washed/disinfected hands  Prep: ChloraPrep  Patient monitoring: blood pressure monitoring, continuous pulse oximetry and EKG  Procedure  Performed under: spinal  Guidance:ultrasound guided    ULTRASOUND INTERPRETATION.  Using ultrasound guidance a 20 G gauge needle was placed in close proximity to the nerve, at which point, under ultrasound guidance anesthetic was injected in the area of the nerve and spread of the anesthesia was seen on ultrasound in close proximity thereto.  There were no abnormalities seen on ultrasound; a digital image was taken; and the patient tolerated the procedure with no complications. Images:still images obtained, printed/placed on chart    Laterality:left  Block Type:adductor canal block  Injection Technique:catheter  Needle Type:Tuohy and echogenic  Needle Gauge:18 G  Resistance on Injection: none  Catheter Size:20 G (20g)  Cath Depth at skin: 9 cm          Post Assessment  Injection Assessment: negative aspiration for heme, incremental injection and no paresthesia on injection  Patient Tolerance:comfortable throughout block  Complications:no  Additional Notes  CATHETER   A high-frequency linear transducer, with sterile cover, was placed on the anterior mid-thigh (between the anterior superior iliac spine and patella). The transducer was then moved medially to identify the Sartorius muscle (Nicolas), Vastus Medialis  "muscle (VMM), Superficial Femoral Artery (SFA) and Vein. The transducer was then moved cephalad or caudad to position the SFA in the middle of the Nicolas. The insertion site was prepped and draped in sterile fashion. Skin and cutaneous tissue was infiltrated with 2-5 ml of 1% Lidocaine. Using ultrasound-guidance, an 18-gauge Contiplex Ultra 360 Touhy needle was advanced in plane from lateral to medial. Preservative-free normal saline was utilized for hydro-dissection of tissue, advancement of Touhy, and to confirm needle placement below the fascial plane of the Nicolas where the Nerve to the VMM is located. Local anesthetic (LA) 5 ml deposited here. The Touhy needle continues its path lateral to the SFA at the level of the Saphenous Nerve. The remainder of the LA was deposited at the 10-11 o'clock position of the SFA. This injection created a space between the Nicolas and the SFA. Aspiration every 5 ml to prevent intravascular injection. Injection was completed with negative aspiration of blood and negative intravascular injection. Injection pressures were normal with minimal resistance. A 20-gauge Contiplex Echo catheter was placed through the needle and advance out the tip of the Touhy 3-5 cm anterior to the SFA. The Touhy needle was then removed, and final catheter position verified at the 12 o'clock position to the SFA. The catheter was secured in the usual fashion with skin glue, benzoin, steri-strips, CHG tegaderm and label noting \"Nerve Block Catheter\". Jerk tape applied at yellow connector and catheter connection.   Performed by: Darryl Briceño, CRNA            "

## 2025-06-13 NOTE — ANESTHESIA PROCEDURE NOTES
Peripheral Block    Pre-sedation assessment completed: 6/13/2025 10:34 AM    Patient reassessed immediately prior to procedure    Patient location during procedure: OR  Start time: 6/13/2025 10:35 AM  Reason for block: at surgeon's request and post-op pain management  Performed by  CRNA/CAA: Darryl Briceño, JOSE ALEJANDRONA: Maryam Bermudez SRNA  Preanesthetic Checklist  Completed: patient identified, IV checked, site marked, risks and benefits discussed, surgical consent, monitors and equipment checked, pre-op evaluation and timeout performed  Prep:  Pt Position: supine  Sterile barriers:cap, gloves, mask, sterile barriers and washed/disinfected hands  Prep: ChloraPrep  Patient monitoring: blood pressure monitoring, continuous pulse oximetry and EKG  Procedure  Performed under: spinal  Guidance:ultrasound guided    ULTRASOUND INTERPRETATION.  Using ultrasound guidance a 20 G gauge needle was placed in close proximity to the nerve, at which point, under ultrasound guidance anesthetic was injected in the area of the nerve and spread of the anesthesia was seen on ultrasound in close proximity thereto.  There were no abnormalities seen on ultrasound; a digital image was taken; and the patient tolerated the procedure with no complications. Images:still images obtained, printed/placed on chart    Laterality:left  Block Type:adductor canal block  Injection Technique:single-shot  Needle Type:Tuohy and echogenic  Needle Gauge:18 G  Resistance on Injection: none  Catheter size: 20g.    Medications Used: bupivacaine (PF) (MARCAINE) 0.5 % injection - Injection   30 mL - 6/13/2025 10:35:00 AM  dexamethasone sodium phosphate injection - Injection   2 mg - 6/13/2025 10:35:00 AM      Post Assessment  Injection Assessment: negative aspiration for heme, incremental injection and no paresthesia on injection  Patient Tolerance:comfortable throughout block  Complications:no  Additional Notes  SINGLE shot   A high-frequency linear transducer, with  "sterile cover, was placed on the anterior mid-thigh (between the anterior superior iliac spine and patella). The transducer was then moved medially to identify the Sartorius muscle (Nicolas), Vastus Medialis muscle (VMM), Superficial Femoral Artery (SFA) and Vein. The transducer was then moved cephalad or caudad to position the SFA in the middle of the Nicolas. The insertion site was prepped and draped in sterile fashion. Skin and cutaneous tissue was infiltrated with 2-5 ml of 1% Lidocaine. Using ultrasound-guidance, a 20-gauge B-Gomez 4\" Ultraplex 360 non-stimulating echogenic needle was advanced in plane from lateral to medial. Preservative-free normal saline was utilized for hydro-dissection of tissue, advancement of needle, and to confirm needle placement below the fascial plane of the Nicolas where the Nerve to the VMM is located. Local anesthetic (LA) 5 ml deposited here. The needle continues its path lateral to the SFA at the level of the Saphenous Nerve. The remainder of the LA was deposited at the 10-11 o'clock position of the SFA. This injection created a space between the Nicolas and the SFA. Aspiration every 5 ml to prevent intravascular injection. Injection was completed with negative aspiration of blood and negative intravascular injection. Injection pressures were normal with minimal resistance.   Performed by: Maryam Bermudez, GLO            "

## 2025-06-13 NOTE — OP NOTE
OPERATIVE REPORT     DATE OF PROCEDURE: 6/13/2025    SURGEON: Olaf Buck M.D.     ASSISTANT(S): Circulator: Celia Medrano RN; Sylvie Fermin RN  Scrub Person: Elizabeth Bell; Jorge Cornelius; Liz Linda  Vendor Representative: Mendez Wiley  Nursing Assistant: Neena Palafox  Assistant: Pete Soto PA-C  Other: Pau Lopez RN  Assistant: Pete Soto PA-C    Note-PA was utilized during the case to facilitate positioning the patient, exposure, retraction, placement of final components and definitive closure.    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 Extensile exposure and Quadricepsplasty with re-alignment.     SURGICAL DETAILS:     APPROACH: Medial parapatellar     ANESTHESIA: General Plus regional    PREOPERATIVE ANTIBIOTICS: Ancef 2 g IV    TRANEXAMIC ACID: IV    TOURNIQUET TIME: 108 min @300 mmHg     ESTIMATED BLOOD LOSS: 150 cc     SPECIMENS: Multiple tissue specimens and synovial fluid from the left knee were sent for culture analysis. Explanted total knee arthroplasty components.     IMPLANTS:   /Brand: Dyer triathlon  Femur 2 CR  Tibia 11 mm all poly  Cement: 3 bags Palacos mixed with 3 grams of vancomycin and 3.6 grams of tobramycin per bag     DRAINS: None    LOCAL INJECTION: none     MODIFIER(S): None    COMPLICATIONS: None apparent    INDICATIONS FOR PROCEDURE: The patient had an uneventful postoperative course after the original left knee arthroplasty and no evidence of wound issues or problems. Early recovery proceeded as expected.  Patient was doing well at a year postop.  However approximately a month later she developed a wound at the distal lateral aspect of the knee overlying  Norma's tubercle.  Debridement was performed by primary care followed by dermatologist.  She subsequently was seen by me without my knowledge of the prior interventions.  Based on the location of the wound relative to the knee joint, there was concern of communication with this wound to the knee joint. A specific clinical evaluation protocol was initiated to evaluate the painful arthroplasty for the possibility of prosthetic joint/implant infection. Acute phase reactant tests were performed and included a CBC, ESR, and CRP. These acute phase reactants were abnormal and elevated, suggestive of an infection. A knee joint aspiration was then performed. The fluid was cloudy/purulent with the cell count and differential abnormally elevated and indicative of an ongoing infection. Cultures from the aspiration were sent, and revealed staph species. 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 Based on the laboratory evidence and the patient history, a chronic infection was favored. The recommendation was for a complete revision of the left knee arthroplasty and placement of an antibiotic eluting revision total knee arthroplasty to treat the deep infection. The need to remove the entire implant and any necrotic bone and/or tissue significantly infiltrated by the bacteria was also discussed. The need for possible weight bearing restrictions after surgery depending on host bone quality was outlined following the procedure to allow for appropriate wound and tissue healing. The 2-stage re-implantation process and multidisciplinary team approach to treatment was also described with physician teams identified. We discussed that joint reconstruction would only occur if/when there is adequate clinical verification the infection has been eradicated. We then had a long discussion about the extensive risks, benefits, and complications related to the hardware removal;  specifically degradation of femoral and tibial bone after cement removal. Other potential risks of the surgical treatment were outlined and included but were not limited to femur fracture, tibia fracture, persistent infection, bone loss, bleeding, anesthesia risks, nerve injury, vessel injury, instability/dislocation, pain, blood clots, possible need for blood transfusion, possible need for further procedures, myocardial infarction, stroke, and death. Understanding of all topics was conveyed to me by the patient pre-operatively, and patient consent was given to proceed with the infected knee arthroplasty removal, radical debridement, placement of an antibiotic eluting revision left total knee arthroplasty.     INTRAOPERATIVE FINDINGS: Sinus tract extending from external wound to anterior lateral joint with associated purulent synovial fluid.  There was scalloping adjacent to implants with implants remaining well-fixed.    PROCEDURE: The patient was identified in the preoperative holding area. The operative site was confirmed and marked. A sequential compression device was placed on the nonoperative leg. The risks, benefits, and alternatives to surgery were again confirmed with the patient and the patient wished to proceed. The patient was brought to the operating room and placed on the operating room table in the supine position. All bony prominences were padded. A huddle was performed with the patient and all vital surgical team members to confirm the correct operative site, procedure, anesthesia type, and operative plan with the patient. After anesthesia was performed, a tourniquet was applied to the upper thigh of the operative leg. A full knee exam was performed once anesthesia was in full effect. Intravenous antibiotic prophylaxis was given and confirmed with the anesthesia team.     The operative leg was prepped and draped in the usual sterile fashion. A surgical time out was performed immediately preceding  the incision with all personnel in the operating room to confirm patient identity, the correct operative site and extremity, correct radiographic studies, availability of appropriate surgical equipment and agreement on the planned procedure. The operative knee was elevated and exsanguinated using gravity and the tourniquet was inflated. The knee was exposed using the previous skin incision, extending proximally and distally for exposure. Dissection was carried down through skin and subcutaneous tissue with care taken to preserve well vascularized skin flaps medially and laterally. The extensor mechanism was identified and scar tissue was cleared both medial and lateral. A medial parapatellar arthrotomy was made to enter the knee space. Upon opening the joint space, purulent fluid was obtained and samples were sent for culture. The synovium was thickened, hypertrophic, and inflamed. A complete synovectomy was performed for exposure, and the medial and lateral gutter scar and synovial reflections were cleared. The extensor mechanism was intact, and the bulk of the quad and patellar tendon were poorly mobile and adherent to the joint capsule and underlying muscle and bone. This made it difficult to attain adequate flexion for implant exposure without placing excessive stress on the patellar tendon insertion to the tibial tubercle. To decrease the likelihood of tibial tubercle avulsion, a quadricepsplasty was then performed proximally. The scarred quadriceps tendon was further released from the supra-patellar region, and then freed from its adhesions to the distal femur. Wide detailed dissection around the implants and capsule was then completed. Wide exposure of the tibia was required with a large medial sleeve dissection.      The implants were then evaluated. The patellar component was well-fixed. There were areas of scalloping to suggest infection. Similar findings were present on the tibial and femoral sides;  scalloping and erosion into the implant, bone, and cement interfaces. Fixation, rotational positioning, position of the joint-line, and varus-valgus alignment of both components was evaluated. The femoral component was well fixed with reasonable alignment. The tibial tray was found to be well-fixed. The tibial polyethylene was removed at this time. The joint remained incredibly stiff, and the posterior recesses were scarred down completely. An extensive posterior capsular release was performed off the posterior distal femur and around the proximal posterior portion of the tibial tray using an osteotome and electrocautery. A large medial sleeve of capsule was released off the tibia to protect the medial collateral ligament at the joint line. The medial epicondyle was intact. Lateral dissection was carried out to further mobilize the patella in flexion. Motion improved, and stress on the collateral ligaments decreased. Once this was completed, adequate exposure of the implants was considerably improved.     Attention was then turned to implant removal, starting with the femoral component. Osteotomes were used to break up the remaining bone- implant interfaces circumferentially. Once adequate separation at the bone-implant interface was obtained, manual dis-impaction techniques were used to separate the femoral component from the femur. The condyles were left intact and attached. The bony interface adjacent to the removed femoral component was then thoroughly debrided with osteotomes, and curettes to remove any residual fibrous debris, creating a clean bony surface. Attention was then turned to the tibia component removal, where an oscillating saw and osteotomes were used to break up the implant-bone interface. Once the interface was broken, the tibial tray was dis-impacted from the bone. The bony interface was then exposed and debrided to removal any residual fibrous debris to create a clean bony surface. The proximal  tibia was deficient and had a significant medial and central bone loss.. After removing all debris, there were areas found anteriorly in the tibia where abscess had been present and osteomyelitis was likely active. This was debrided from inside out so that there were no areas of soft abnormal bone remaining. Finally, the patellar component was removed using an oscillating saw, followed by osteotomes and bone curettes to remove any residual fibrous debris. A clean bony surface was created.     With the component removal and soft tissue debridement completed, the entire knee joint was lavaged with pulsatile sterile saline.  At this point, exploration of the sinus tract was performed with a curvilinear incision through the previously made incision.  This was found to communicate with the joint capsule at the anterior lateral joint line.  This area was copiously debrided with a curette.  Once this was completed attention was turned back to the joint space proper.  Extensive excisional debridement of the synovial lining and bone interfaces occurred to remove any necrotic or infected tissue.  The intramedullary canals of the femur and tibia were then serially reamed with reamers to debride the intramedullary canal.. The knee was extended and the entire knee joint was copiously lavaged with pulsatile sterile saline. The tourniquet was released and no excessive bleeding was encountered. Electrocautery was used to obtain hemostasis, especially in the posterior recesses of the knee and areas of known bleeding. Once hemostasis was obtained, the joint was again copiously lavaged with pulsatile sterile saline. Dilute Betadine and hydrogen peroxide solution and quarter strength Dakins solution were also used intermittently between pulse lavage to clean the wound. Overall, the bone was thin and in very poor condition.     At this point, the wound was clean and attention was turned to placement of the antibiotic eluting revision  total knee arthroplasty. New gloves were donned as well as new drapes to prevent re-contamination of the wound.  At this point, the reconstructive portion of the case resumed.  The overall position of the proximal tibial osteotomy after implant removal demonstrated reasonable varus valgus alignment with a reasonably flat surface for new implant placement.  As a result, in order to preserve bone, no bony resection was required.  A trial tibial baseplate was sized. There was good coverage obtained over supported host bone with the base-plate rotated appropriately with its mid-point at the junction of the medial one-third and lateral two-thirds of the tibial tubercle. Punches and pins fixed the trial in this position. Overall tibial tray coverage and stability was excellent.      Attention was then turned to the femur.  Once again the distal femur surface as well as the posterior condylar surfaces were evaluated and found to be relatively intact with minimal bone loss.  As a result, in order to preserve bone, no bony resection was required.  The trial femoral component was then sized in order to best approximate native femoral anatomy.  No augmentation was necessary.  A CR component was selected based on the remaining host bone.  The femoral trial was then impacted into place. The medial-lateral and anterior-posterior dimensions were then checked; anatomic fit and coverage were achieved with minimal bone-implant gapping. The posterior recesses and condyles were further cleared of osteophyte for the high flex function of the knee with large curved osteotome.      Trial reductions were then done with non-constrained polyethylene insert trials. Insert thickness was increased until full stability was achieved with medial to lateral stress in full extension.  With the appropriate sized polyethylene trial in place, the knee achieved full extension with excellent stability to varus and valgus stress and minimal  flexion-extension mismatch. The patella tracked midline with a provisional extensor mechanism realignment. A lateral release was not needed to improve patellar tracking.  The tibia and femur were then prepared for the final component placement.    All trials were then removed. The tourniquet was released. Electrocautery was used at that point to treat the areas of bleeding. The knee space was further irrigated with pulsatile sterile saline. The posterior recesses of the knee and areas of known bleeding were treated with electrocautery to reduce post-operative bleeding. The tourniquet was then reinflated. The cut bone surfaces were further irrigated, suctioned, and dried.  The previously constructed antibiotic dowels were then placed in the femoral and tibial canals.  Polymethylmethacrylate (PMMA) bone cement with antibiotics were mixed, and the final implants were cemented into place, tibia followed by femur. The cement was compressed by holding the knee in extension.  The excess cement was curetted free and the knee was held in extension until the cement cured. The tourniquet was released and no excessive bleeding was encountered. Synovial bleeding was further treated with electrocautery. Excessive oozing from the tissues was not present at this point; therefore, a drain was not necessary.    Extensor mechanism alignment and re-approximation of the quadricepsplasty was then addressed. The released portions of the quadriceps tendon were provisionally aligned and clamped in place with towel clips. The medial capsule was then re-approximated to the quadriceps tendon with #1 PDS to bring the medial capsule into anatomic position. The remainder of the extensor mechanism was repaired with interrupted #1 PDS sutures. The deep and superficial subcutaneous tissue was irrigated and carefully closed with interrupted #1 and 2-0 PDS suture. The skin was closed with 3-0 nylon suture. A Covaderm dressing was then placed, followed  by soft roll, Ace wrap and a sequential compression device to the operative limb. The patient was sufficiently recovered from anesthesia, transferred to a hospital bed and taken to the PACU in stable condition.     The patient underwent risk stratification preoperatively and aspirin was chosen for DVT prophylaxis. Delay in starting chemical prophylaxis for 23 hours from surgical incision was over concerns for hematoma formation and wound related issues.     POSTOPERATIVE PLAN:   Weight bearing as tolerated with assistive device.   Knee range of motion as tolerated.    Pain control with PO/IV meds   Adductor canal catheter placement by Anesthesia Pain Management Team.   Follow-up intraoperative cultures  Infectious Disease consult to manage IV/PO antibiotics based on intraoperative cultures and re-implantation timing   Keep silver dressing in place for 7 days post op. Change dressing only if saturated.   PT/OT for mobilization and medical equipment needs   Social work for discharge planning needs   Follow up in 3 weeks for post-operative wound check with XR AP and lateral of operative knee.

## 2025-06-13 NOTE — PLAN OF CARE
Goal Outcome Evaluation:  Plan of Care Reviewed With: patient           Outcome Evaluation: Patient was able to ambulate in PACU post surgery. She requried assist for safety and further ambulation was limited by fatigue and pain. Out of dressing bleeding at distal L LE noted and RN aware. Recommend continued skilled PT and home with assistance at discharge.    Anticipated Discharge Disposition (PT): home with assist, home with outpatient therapy services

## 2025-06-13 NOTE — DISCHARGE INSTRUCTIONS
"DISCHARGE INSTRUCTIONS   Dr. Buck     Total Knee Replacement/ Partial (Uni) Knee Replacement     Wound Care   1) Keep wound / incision area clean and dry.   2) Dressing to remain in place until post-operative day 7. Upon dressing removal, assess for wound drainage. If no drainage is present, keep wound / incision area open to air as much as possible. If drainage is present, place sterile dressing to cover wound and assess daily. If drainage continues to occur after post-operative day 14, call the office for an urgent appointment. (You should be seen in the clinic within 1-2 days of calling). DO NOT REMOVE SUTURES (IF PRESENT) UNDER ANY CIRCUMSTANCES PRIOR TO FOLLOW UP APPOINTMENT.  3) No baths or swimming until otherwise instructed. The wound must remain dry for 10 days after surgery. After 10 days, you may begin to shower only if no drainage is present. No submerging the wound under standing water until cleared by your physician (no baths, hot tubs, swimming pools, etc). Sponge baths are the best way to perform personal hygiene while at the same time protecting the wound from moisture.   4) Prior to showering, the wound must remain dry for 72 consecutive hours (no drainage whatsoever) prior to showering. If the wound drains or spots, the clock \"resets\" - make sure the wound has been drainage-free for 72 consecutive hours.   5) Once you are allowed to get the wound wet, please use gentle soap to wash the wound area. DO NOT aggressively scrub the wound with a washcloth or bath sponge. Please visually inspect your wound(s) at least once daily. If the wound(s) are in a difficult to see location, please use a mirror or have someone else assist with visual inspection.   6) No scrubbing the wound. You may \"pad dry\" the wound, but do not rub, as this may open up the wound and pre-dispose to wound infection.   7) Do not apply lotions or creams to incision site, unless instructed otherwise.   8) Observe for redness, " "swelling, or drainage. Please call the clinic immediately if you have fevers, chills with warmth/redness surrounding wound site or if you notice pus drainage from the wound site     Activity   No heavy lifting objects greater than 10 pounds.   No driving while on narcotic pain medication.   No submerging wound under standing water (pool, bath tub, etc.) until otherwise instructed.   You may be protected weightbearing as tolerated on your operative (left lower) extremity   Use crutches or a walker for ambulation.   Wean as appropriate per physical therapist's discretion.   Do not sleep with a pillow behind your knee. You may sleep with a pillow behind your Achilles or foot. This will prevent your knee from getting stiff in the flexed (\"bent\") position and will encourage full extension (leg straightening).   Be vigilant in terms of working on full knee extension and flexion. Your goal should be 0 to 90 degrees by 2 weeks post-op - MINIMUM!   Knee range of motion as tolerated.    Blood Clot Prophylaxis   (Aspirin vs. Lovenox vs. Eliquis administration is determined by your surgeon and tailored to your specific risk profile. You will be discharged with one of these medications.) You will need to complete a total 4 week course of enteric coated aspirin 325 mg (or 81mg) twice daily or Eliquis 2.5mg twice daily, in order to minimize your risk of blood clots following surgery. You will be supplied with a prescription to obtain this. Alternatively, you will need to compete a total 2 week course of Lovenox after surgery (followed by a 2 week course of aspirin twice daily), in order to minimize the risk of blood clots following surgery. Lovenox requires a single shot in the abdomen, to be taken once daily. You will be supplied with the prescription to obtain this. Prior to your discharge from the hospital, the nursing staff will instruct you on self-administration of the Lovenox, if you will be returning directly home from the " "hospital.     Discharge Pain Medications   You will be given a prescription for pain medication. You should start taking this the same day after your surgery. Wean off as tolerated. Do not wait to take the pain medication until the pain is severe, as it will be difficult to \"catch up\" once this occurs. The pain medication usually reaches its full effect ~1 hour after ingesting. If you have been sent home on Colace, this medication should be taken until you are off all narcotic (i.e. Oxycodone, etc) pain medications, in order to prevent constipation. If you have been sent home with a combination of oxycodone and Tylenol, please take Tylenol as scheduled.  You must be careful not to exceed 4,000mg (4 grams) of Tylenol. The oxycodone is to be taken as needed for \"breakthrough\" pain.  Some common side effects of the narcotic pain medications include nausea and itching. Benadryl is a great over the counter medication that helps calm your stomach, decreases your anxiety levels, and minimizes the itching. You can easily purchase this at your local pharmacy as an over-the-counter medication. Please abide by the instructions as printed on the bottle. If your nausea persists, make sure to take small amounts of crackers or other lighter foods.     Follow-Up   Follow-up with Dr. Buck's office in 3 weeks from the surgery date for a post-operative evaluation. Have the following xrays done upon arrival to the follow-up appointment: 3 views of operative knee. Please call Dr. Buck's office at [phone] for orthopaedic appointments or questions.          [facility] COLD THERAPY - PATIENT INSTRUCTION SHEET    Cold Compression Therapy for your comfort and rehabilitation  Your caregivers want you to be productive in your rehab and comfortable during your stay. In keeping with those goals, you will be receiving an [facility] Cold Therapy Wrap to help ease post-operative pain and swelling that might keep you from getting back on track! Your " SMI Cold Therapy Wrap is effective and simple-to-use, and you will be encouraged to apply it throughout your hospital stay and at home through the duration of your recovery.    When you are ready to go home  Be sure to take your SMI Cold Therapy Wrap and both sets of Gel Bags with you for continued comfort and use throughout your rehabilitation. If you don't already have them, ask your nurse or aide to retrieve your SMI Gel Bags from the patient freezer.    Home use precautions  Always follow your medical professional's application instructions upon discharge. Your SMI Cold Therapy Wrap and Gel Bags are designed to last for months following your surgery. Never heat the Gel Bags unless specified by your healthcare provider. Supervision is advised when using this product on children or geriatric patients. To avoid danger of suffocation, please keep the outer plastic packaging away from children & pets.    Cold Therapy Instructions  Place Gel Bags in a freezer set ¾ of the way to max temperature for at least (4) hours. For best results, lay the Gel Bags flat and [id number] in the freezer. Once frozen, slide Gel Bags into the gel pouch and secure your wrap to the affected area with the straps.  Gel wraps that have been stored in a freezer for an extended period of time may require a (10) minute period of softening up in a room temperature environment before application.  The gel pouch acts as a protective barrier. NEVER place frozen bags directly onto skin, as this may cause frostbite injury.  The SMI Cold Therapy Wrap is designed to be able to be worm while ambulating. The compression straps can be secured well enough so that the Wrap won't fall off while moving.  Wrap Application Videos can be viewed at [email or web address].  An additional protective barrier such as clothing, a washcloth, hand-towel or pillowcase may be used during prolonged treatment applications.  The Gel-Pouch and Wrap are both Latex-Free and  the Gel Bag ingredients are non toxic.    SMI Wrap care instructions  The [facility] Cold Therapy Wrap may be hand washed and hung to dry when needed.    [facility] re-order information  Additional [facility] body specific wraps and/or Gel Bags can be re-ordered from [email or web address] or call [email or web address] ([phone])

## 2025-06-14 LAB
ALBUMIN SERPL-MCNC: 2.5 G/DL (ref 3.5–5.2)
ALBUMIN/GLOB SERPL: 0.7 G/DL
ALP SERPL-CCNC: 57 U/L (ref 39–117)
ALT SERPL W P-5'-P-CCNC: 8 U/L (ref 1–33)
ANION GAP SERPL CALCULATED.3IONS-SCNC: 7 MMOL/L (ref 5–15)
AST SERPL-CCNC: 24 U/L (ref 1–32)
BILIRUB SERPL-MCNC: 1.3 MG/DL (ref 0–1.2)
BUN SERPL-MCNC: 16 MG/DL (ref 8–23)
BUN/CREAT SERPL: 18.6 (ref 7–25)
CALCIUM SPEC-SCNC: 8.1 MG/DL (ref 8.6–10.5)
CHLORIDE SERPL-SCNC: 105 MMOL/L (ref 98–107)
CK SERPL-CCNC: 90 U/L (ref 20–180)
CO2 SERPL-SCNC: 25 MMOL/L (ref 22–29)
CREAT SERPL-MCNC: 0.86 MG/DL (ref 0.57–1)
CRP SERPL-MCNC: 2.76 MG/DL (ref 0–0.5)
D-LACTATE SERPL-SCNC: 2.1 MMOL/L (ref 0.5–2)
D-LACTATE SERPL-SCNC: 2.2 MMOL/L (ref 0.5–2)
D-LACTATE SERPL-SCNC: 2.8 MMOL/L (ref 0.5–2)
D-LACTATE SERPL-SCNC: 3.3 MMOL/L (ref 0.5–2)
DEPRECATED RDW RBC AUTO: 56.6 FL (ref 37–54)
EGFRCR SERPLBLD CKD-EPI 2021: 76.5 ML/MIN/1.73
ERYTHROCYTE [DISTWIDTH] IN BLOOD BY AUTOMATED COUNT: 15.3 % (ref 12.3–15.4)
GLOBULIN UR ELPH-MCNC: 3.6 GM/DL
GLUCOSE SERPL-MCNC: 196 MG/DL (ref 65–99)
HCT VFR BLD AUTO: 34.8 % (ref 34–46.6)
HGB BLD-MCNC: 11.5 G/DL (ref 12–15.9)
MCH RBC QN AUTO: 33.1 PG (ref 26.6–33)
MCHC RBC AUTO-ENTMCNC: 33 G/DL (ref 31.5–35.7)
MCV RBC AUTO: 100.3 FL (ref 79–97)
PLATELET # BLD AUTO: 78 10*3/MM3 (ref 140–450)
PMV BLD AUTO: 9.9 FL (ref 6–12)
POTASSIUM SERPL-SCNC: 4.1 MMOL/L (ref 3.5–5.2)
PROT SERPL-MCNC: 6.1 G/DL (ref 6–8.5)
RBC # BLD AUTO: 3.47 10*6/MM3 (ref 3.77–5.28)
SODIUM SERPL-SCNC: 137 MMOL/L (ref 136–145)
WBC NRBC COR # BLD AUTO: 8.35 10*3/MM3 (ref 3.4–10.8)

## 2025-06-14 PROCEDURE — 97166 OT EVAL MOD COMPLEX 45 MIN: CPT

## 2025-06-14 PROCEDURE — 85027 COMPLETE CBC AUTOMATED: CPT | Performed by: INTERNAL MEDICINE

## 2025-06-14 PROCEDURE — 97116 GAIT TRAINING THERAPY: CPT

## 2025-06-14 PROCEDURE — 97535 SELF CARE MNGMENT TRAINING: CPT

## 2025-06-14 PROCEDURE — 25010000002 CEFTRIAXONE PER 250 MG: Performed by: INTERNAL MEDICINE

## 2025-06-14 PROCEDURE — 97530 THERAPEUTIC ACTIVITIES: CPT

## 2025-06-14 PROCEDURE — 86140 C-REACTIVE PROTEIN: CPT | Performed by: INTERNAL MEDICINE

## 2025-06-14 PROCEDURE — 25810000003 LACTATED RINGERS PER 1000 ML: Performed by: ANESTHESIOLOGY

## 2025-06-14 PROCEDURE — 99024 POSTOP FOLLOW-UP VISIT: CPT | Performed by: ORTHOPAEDIC SURGERY

## 2025-06-14 PROCEDURE — 80053 COMPREHEN METABOLIC PANEL: CPT | Performed by: INTERNAL MEDICINE

## 2025-06-14 PROCEDURE — 83605 ASSAY OF LACTIC ACID: CPT | Performed by: INTERNAL MEDICINE

## 2025-06-14 PROCEDURE — 82550 ASSAY OF CK (CPK): CPT | Performed by: INTERNAL MEDICINE

## 2025-06-14 PROCEDURE — 25010000002 DAPTOMYCIN PER 1 MG: Performed by: INTERNAL MEDICINE

## 2025-06-14 RX ORDER — LACTULOSE 10 G/15ML
10 SOLUTION ORAL 2 TIMES DAILY
Status: DISCONTINUED | OUTPATIENT
Start: 2025-06-14 | End: 2025-06-16 | Stop reason: HOSPADM

## 2025-06-14 RX ORDER — SODIUM CHLORIDE 9 MG/ML
100 INJECTION, SOLUTION INTRAVENOUS CONTINUOUS
Status: ACTIVE | OUTPATIENT
Start: 2025-06-14 | End: 2025-06-14

## 2025-06-14 RX ORDER — NADOLOL 20 MG/1
40 TABLET ORAL NIGHTLY
Status: DISCONTINUED | OUTPATIENT
Start: 2025-06-14 | End: 2025-06-16 | Stop reason: HOSPADM

## 2025-06-14 RX ORDER — ASPIRIN 81 MG/1
81 TABLET ORAL EVERY 12 HOURS SCHEDULED
Status: DISCONTINUED | OUTPATIENT
Start: 2025-06-16 | End: 2025-06-16 | Stop reason: HOSPADM

## 2025-06-14 RX ADMIN — NADOLOL 40 MG: 20 TABLET ORAL at 20:27

## 2025-06-14 RX ADMIN — SODIUM CHLORIDE 2000 MG: 900 INJECTION INTRAVENOUS at 19:45

## 2025-06-14 RX ADMIN — LACTULOSE 10 G: 20 SOLUTION ORAL at 20:26

## 2025-06-14 RX ADMIN — DAPTOMYCIN 500 MG: 500 INJECTION, POWDER, LYOPHILIZED, FOR SOLUTION INTRAVENOUS at 12:32

## 2025-06-14 RX ADMIN — METOCLOPRAMIDE 5 MG: 5 TABLET ORAL at 17:26

## 2025-06-14 RX ADMIN — METOCLOPRAMIDE 5 MG: 5 TABLET ORAL at 09:05

## 2025-06-14 RX ADMIN — OXYCODONE 5 MG: 5 TABLET ORAL at 02:45

## 2025-06-14 RX ADMIN — Medication 3 ML: at 09:06

## 2025-06-14 RX ADMIN — PANTOPRAZOLE SODIUM 40 MG: 40 TABLET, DELAYED RELEASE ORAL at 20:27

## 2025-06-14 RX ADMIN — OXYCODONE 5 MG: 5 TABLET ORAL at 10:48

## 2025-06-14 RX ADMIN — PANTOPRAZOLE SODIUM 40 MG: 40 TABLET, DELAYED RELEASE ORAL at 09:05

## 2025-06-14 RX ADMIN — SODIUM CHLORIDE, SODIUM LACTATE, POTASSIUM CHLORIDE, CALCIUM CHLORIDE 9 ML/HR: 20; 30; 600; 310 INJECTION, SOLUTION INTRAVENOUS at 05:23

## 2025-06-14 RX ADMIN — OXYCODONE 5 MG: 5 TABLET ORAL at 16:19

## 2025-06-14 RX ADMIN — ALPRAZOLAM 0.25 MG: 0.25 TABLET ORAL at 17:26

## 2025-06-14 RX ADMIN — LACTULOSE 20 G: 20 SOLUTION ORAL at 09:05

## 2025-06-14 NOTE — PLAN OF CARE
Goal Outcome Evaluation:      A&Ox4 and VSS on RA. Surgical ace wrap dressing intact but with moderate amount of new drainage noted. Additional pressure dressing applied and Dr. Buck and Dr. PORTER notified. Infu block in place and infusing. Patient up to BSC and voiding spontaneously. Spouse at bedside. PRN PO pain medication administered and effective per pt. Call light in reach

## 2025-06-14 NOTE — PLAN OF CARE
Goal Outcome Evaluation:  Plan of Care Reviewed With: patient, spouse        Progress: improving  Outcome Evaluation: Supervision for transfers supine to sit to stand.  Gt to 80 ft with RW and cues for heels strike under supervison. SLR 4/5 wiht sligh lag OOB in chair to tolerance.  Spouse with pt in room    Anticipated Discharge Disposition (PT): home with assist, home with outpatient therapy services

## 2025-06-14 NOTE — PLAN OF CARE
Goal Outcome Evaluation:      Pt is A&Ox4. Slept well throughout the night. Pain managed with PRN PO pain meds. NV block remains in place. Slept with 1L NC. Q2 turn per pt. request. Upx2 to bedside commode. Tolerated antibiotics well with no adverse reactions.

## 2025-06-14 NOTE — PROGRESS NOTES
"CHIEF COMPLAINT: Follow-up for revision left knee arthroplasty    SUBJECTIVE  Patient resting comfortably.  Pain well-controlled.  No events overnight.    PHYSICAL THERAPY PROGRESS  Outcome Evaluation: Supervision for transfers supine to sit to stand.  Gt to 80 ft with RW and cues for heels strike under supervison. SLR 4/5 wiht sligh lag OOB in chair to tolerance.  Spouse with pt in room (25)     OBJECTIVE  Temp (24hrs), Av.8 °F (36.6 °C), Min:97.2 °F (36.2 °C), Max:98.2 °F (36.8 °C)    Blood pressure 101/57, pulse 70, temperature 98 °F (36.7 °C), temperature source Oral, resp. rate 18, height 157.5 cm (62\"), weight 78.9 kg (174 lb), SpO2 91%, not currently breastfeeding.    Lab Results (last 24 hours)       Procedure Component Value Units Date/Time    Lactic Acid, Plasma [[id number]]  (Abnormal) Collected: 25    Specimen: Blood Updated: 25     Lactate 2.2 mmol/L      Comment: Falsely depressed results may occur on samples drawn from patients receiving N-Acetylcysteine (NAC) or Metamizole.       CBC (No Diff) [[id number]]  (Abnormal) Collected: 25    Specimen: Blood Updated: 25     WBC 8.35 10*3/mm3      RBC 3.47 10*6/mm3      Hemoglobin 11.5 g/dL      Hematocrit 34.8 %      .3 fL      MCH 33.1 pg      MCHC 33.0 g/dL      RDW 15.3 %      RDW-SD 56.6 fl      MPV 9.9 fL      Platelets 78 10*3/mm3     CK [[id number]]  (Normal) Collected: 25    Specimen: Blood Updated: 25     Creatine Kinase 90 U/L     Comprehensive Metabolic Panel [[id number]]  (Abnormal) Collected: 25    Specimen: Blood Updated: 25     Glucose 196 mg/dL      BUN 16.0 mg/dL      Creatinine 0.86 mg/dL      Sodium 137 mmol/L      Potassium 4.1 mmol/L      Chloride 105 mmol/L      CO2 25.0 mmol/L      Calcium 8.1 mg/dL      Total Protein 6.1 g/dL      Albumin 2.5 g/dL      ALT (SGPT) 8 U/L      AST (SGOT) 24 U/L      Alkaline Phosphatase 57 U/L      " Total Bilirubin 1.3 mg/dL      Globulin 3.6 gm/dL      Comment: Calculated Result        A/G Ratio 0.7 g/dL      BUN/Creatinine Ratio 18.6     Anion Gap 7.0 mmol/L      eGFR 76.5 mL/min/1.73     Narrative:      GFR Categories in Chronic Kidney Disease (CKD)              GFR Category          GFR (mL/min/1.73)    Interpretation  G1                    90 or greater        Normal or high (1)  G2                    60-89                Mild decrease (1)  G3a                   45-59                Mild to moderate decrease  G3b                   30-44                Moderate to severe decrease  G4                    15-29                Severe decrease  G5                    14 or less           Kidney failure    (1)In the absence of evidence of kidney disease, neither GFR category G1 or G2 fulfill the criteria for CKD.    eGFR calculation 2021 CKD-EPI creatinine equation, which does not include race as a factor    C-reactive Protein [[id number]]  (Abnormal) Collected: 06/14/25 0817    Specimen: Blood Updated: 06/14/25 0937     C-Reactive Protein 2.76 mg/dL     Tissue / Bone Culture - Synovium, Knee, Left [[id number]] Collected: 06/13/25 1133    Specimen: Synovium from Knee, Left Updated: 06/14/25 0800     Tissue Culture No growth     Gram Stain Moderate (3+) WBCs seen      No organisms seen    Tissue / Bone Culture - Synovium, Knee, Left [[id number]] Collected: 06/13/25 1134    Specimen: Synovium from Knee, Left Updated: 06/14/25 0800     Tissue Culture No growth     Gram Stain No WBCs or organisms seen    Tissue / Bone Culture - Tissue, Knee, Left [[id number]] Collected: 06/13/25 1150    Specimen: Tissue from Knee, Left Updated: 06/14/25 0800     Tissue Culture No growth     Gram Stain Moderate (3+) WBCs seen      No organisms seen    Body Fluid Culture - Synovial Fluid, Knee, Left [[id number]] Collected: 06/13/25 1131    Specimen: Synovial Fluid from Knee, Left Updated: 06/14/25 0756     Body Fluid Culture No growth      Gram Stain Moderate (3+) WBCs seen      No organisms seen    Potassium [[id number]]  (Normal) Collected: 06/13/25 1658    Specimen: Blood Updated: 06/13/25 1735     Potassium 4.2 mmol/L     CK [[id number]]  (Normal) Collected: 06/13/25 1658    Specimen: Blood Updated: 06/13/25 1735     Creatine Kinase 131 U/L     POC Glucose Once [[id number]]  (Abnormal) Collected: 06/13/25 1535    Specimen: Blood Updated: 06/13/25 1537     Glucose 138 mg/dL     Fungus Culture - Synovial Fluid, Knee, Left [[id number]] Collected: 06/13/25 1131    Specimen: Synovial Fluid from Knee, Left Updated: 06/13/25 1301    AFB Culture - Synovial Fluid, Knee, Left [[id number]] Collected: 06/13/25 1131    Specimen: Synovial Fluid from Knee, Left Updated: 06/13/25 1301    Anaerobic Culture 10 Day Incubation - Synovial Fluid, Knee, Left [[id number]] Collected: 06/13/25 1131    Specimen: Synovial Fluid from Knee, Left Updated: 06/13/25 1301    Anaerobic Culture 10 Day Incubation - Synovium, Knee, Left [[id number]] Collected: 06/13/25 1134    Specimen: Synovium from Knee, Left Updated: 06/13/25 1301    Fungus Culture - Synovium, Knee, Left [[id number]] Collected: 06/13/25 1134    Specimen: Synovium from Knee, Left Updated: 06/13/25 1301    Anaerobic Culture 10 Day Incubation - Tissue, Knee, Left [[id number]] Collected: 06/13/25 1150    Specimen: Tissue from Knee, Left Updated: 06/13/25 1301    Fungus Culture - Tissue, Knee, Left [[id number]] Collected: 06/13/25 1150    Specimen: Tissue from Knee, Left Updated: 06/13/25 1301    AFB Culture - Tissue, Knee, Left [[id number]] Collected: 06/13/25 1150    Specimen: Tissue from Knee, Left Updated: 06/13/25 1301    Fungus Culture - Synovium, Knee, Left [[id number]] Collected: 06/13/25 1133    Specimen: Synovium from Knee, Left Updated: 06/13/25 1301    Anaerobic Culture 10 Day Incubation - Synovium, Knee, Left [[id number]] Collected: 06/13/25 1133    Specimen: Synovium from Knee, Left Updated: 06/13/25 9536               PHYSICAL EXAM  Left lower extremity: Dressing intact with serosanguineous drainage.  Able to form straight leg raise without assist.  Intact EHL, FHL, tibialis anterior, and gastrocsoleus. Sensation intact to light touch to deep peroneal, superficial peroneal, sural, saphenous, tibial nerves. 2+ palpable DP and PT pulses.         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  PT/OT for post op mobilization and medical equipment needs   Antibiotics directed by infectious disease.  Dr. Ovalle following.  Follow-up intraoperative cultures.  Cultures pending.  SCD's bilateral lower extremities   Aspirin for DVT prophylaxis.  Will hold initiation of DVT prophylaxis until 6/16/2025 due to thrombocytopenia and wound drainage.  Social work for discharge planning.   Dressing to remain in place for 7 days. May remove on POD#7. If no drainage, may shower on POD#10. No submerging wound in water. If drainage is noted, sterile dressing should be placed and wound checked daily. No showering until wound has remained dry for 72 consecutive hours.   Follow up in 3 weeks for re-assessment.      Future Appointments   Date Time Provider Department Center   7/3/2025  1:50 PM Milagros Cuello PA-C [facility]   7/28/2025 10:30 AM Cameron Emerson MD [facility]   8/28/2025  3:15 PM David Bustillos MD [facility]       Olaf Buck MD  06/14/25  09:52 EDT
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
## 2025-06-14 NOTE — PLAN OF CARE
Goal Outcome Evaluation:  Plan of Care Reviewed With: (P) patient, spouse        Progress: (P) no change  Outcome Evaluation: (P) Pt presented with increased pain with participation and decreased activity tolerance limiting ADLs and mobility warranting OT services. Pt reported severe pain, dizziness and fatigue. Pt attempted to ambulate to the bathroom, instead used a bedside commode. Rec DC IPR.    Anticipated Discharge Disposition (OT): (P) inpatient rehabilitation facility
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
## 2025-06-14 NOTE — PROGRESS NOTES
"IM progress note      Jeevan Cardoso  [id number]  1960     LOS: 1 day     Attending: Olaf Buck MD    Primary Care Provider: Carl Mcnamara MD      Chief Complaint/Reason for visit:  No chief complaint on file.      Subjective    Feels ok.   Good pain control.  Tolerating antibiotics.  Participated with PT.  No fever, chills, no nausea, vomiting.  No shortness of breath, chest pain.  Objective        Visit Vitals  /60   Pulse 67   Temp 98.1 °F (36.7 °C) (Oral)   Resp 18   Ht 157.5 cm (62\")   Wt 78.9 kg (174 lb)   LMP  (LMP Unknown)   SpO2 94%   BMI 31.83 kg/m²     Temp (24hrs), Av.8 °F (36.6 °C), Min:97.2 °F (36.2 °C), Max:98.2 °F (36.8 °C)      Intake/Output:    Intake/Output Summary (Last 24 hours) at 2025 1236  Last data filed at 2025 0900  Gross per 24 hour   Intake 1460 ml   Output 950 ml   Net 510 ml        Physical Therapy:  Date of Service: 25  Creation Time: 25     Signed         Goal Outcome Evaluation:  Plan of Care Reviewed With: patient, spouse  Progress: improving  Outcome Evaluation: Supervision for transfers supine to sit to stand.  Gt to 80 ft with RW and cues for heels strike under supervison. SLR 4/5 wiht sligh lag OOB in chair to tolerance.  Spouse with pt in room     Anticipated Discharge Disposition (PT): home with assist, home with outpatient therapy services                 Physical Exam:     General Appearance:    Alert, cooperative, in no acute distress   Head:    Normocephalic, without obvious abnormality, atraumatic    Lungs:     Normal effort, symmetric chest rise,  clear to      auscultation bilaterally              Heart:    Regular rhythm and normal rate, normal S1 and S2    Abdomen:     Normal bowel sounds, no masses, no organomegaly, soft        nontender, nondistended, no guarding, no rebound                tenderness   Extremities: Clean dry and intact ace on left knee.  Peripheral nerve block catheter present.  Intact " flexion and dorsiflexion bilateral feet.  No clubbing, cyanosis or edema.  No deformities.    Pulses:   Pulses palpable and equal bilaterally   Skin:   No bleeding, bruising or rash          Results Review:     I reviewed the patient's new clinical results.   Results from last 7 days   Lab Units 06/14/25  0817 06/10/25  0913   WBC 10*3/mm3 8.35 4.35   HEMOGLOBIN g/dL 11.5* 13.0   HEMATOCRIT % 34.8 40.3   PLATELETS 10*3/mm3 78* 91*     Results from last 7 days   Lab Units 06/14/25  0817 06/13/25  1658 06/10/25  0913   SODIUM mmol/L 137  --  136   POTASSIUM mmol/L 4.1 4.2 4.0   CHLORIDE mmol/L 105  --  102   CO2 mmol/L 25.0  --  29.0   BUN mg/dL 16.0  --  13.8   CREATININE mg/dL 0.86  --  0.91   CALCIUM mg/dL 8.1*  --  9.0   BILIRUBIN mg/dL 1.3*  --   --    ALK PHOS U/L 57  --   --    ALT (SGPT) U/L 8  --   --    AST (SGOT) U/L 24  --   --    GLUCOSE mg/dL 196*  --  206*     I reviewed the patient's new imaging including images and reports.    All medications reviewed.   [START ON 6/16/2025] aspirin, 81 mg, Oral, Q12H  cefTRIAXone, 2,000 mg, Intravenous, Q24H  DAPTOmycin, 8 mg/kg (Adjusted), Intravenous, Q24H  furosemide, 20 mg, Oral, Daily  lactulose, 20 g, Oral, Daily  metoclopramide, 5 mg, Oral, BID  nadolol, 40 mg, Oral, Nightly  pantoprazole, 40 mg, Oral, BID  sodium chloride, 3 mL, Intravenous, Q12H  spironolactone, 50 mg, Oral, Daily      albuterol, 2.5 mg, Q6H PRN  ALPRAZolam, 0.25 mg, TID PRN  HYDROmorphone, 0.5 mg, Q2H PRN   And  naloxone, 0.1 mg, Q5 Min PRN  labetalol, 10 mg, Q4H PRN  ondansetron ODT, 4 mg, Q6H PRN   Or  ondansetron, 4 mg, Q6H PRN  oxyCODONE, 10 mg, Q4H PRN  oxyCODONE, 5 mg, Q4H PRN  Pharmacy Consult - Pharmacy to dose, , Continuous PRN  sodium chloride, 500 mL, TID PRN  sodium chloride, 3-10 mL, PRN        Assessment & Plan       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 PT/OT, protected  Weight bearing as tolerated LLE  2. Pain control-prns, ACB cath with ropivacaine infusion. Multimodal approach  3. IS-encourage  4. DVT proph-Mechanicals and asa  5. Bowel regimen  6. Resume home medications as appropriate  7. Monitor post-op labs  8. DC planning      - Hypertension:  Resume home medications as appropriate, formulary substitution when indicated.  Holding parameters.  PRN medications for elevated blood pressure.      -GERD:  Resume PPI.  Formulary substitution when indicated.     - Anxiety and depression: Maintained on home regimen     -Monitor operative cultures, perioperative antibiotics will be managed by Dr. Russ Ovalle with [facility].  Expect a PICC line and several weeks of IV antibiotics following discharge     -Cirrhosis: Maintained on regimen including lactulose and diuretics and beta-blocker.    Joe Castano MD  06/14/25  12:36 EDT

## 2025-06-14 NOTE — PROGRESS NOTES
[facility]    Acute pain service Inpatient Progress Note    Patient Name: Jeevan Cardoso  :  1960  MRN:  [id number]        Acute Pain  Service Inpatient Progress Note:    Analgesia:Good  LOC: alert and awake  Resp Status: room air  Cardiac: VS stable  Side Effects:None  Catheter Site:clean, dressing intact and dry  Cath type: peripheral nerve cath(InfuSystem)  Volume: 1mL,8ml, 8ml InfuSystem Pump.  Catheter Plan:Catheter to remain Insitu and Continue catheter infusion rate unchanged  Comments:

## 2025-06-14 NOTE — PLAN OF CARE
Goal Outcome Evaluation:  Plan of Care Reviewed With: patient, spouse        Progress: improving  Outcome Evaluation: pm trteatment inc gait 70 ft, OOB in chair LAQ, SLR AP OOB in chair.  Transfered with supervision.    Anticipated Discharge Disposition (PT): home with assist, home with outpatient therapy services

## 2025-06-15 LAB
ANION GAP SERPL CALCULATED.3IONS-SCNC: 6 MMOL/L (ref 5–15)
BUN SERPL-MCNC: 15.6 MG/DL (ref 8–23)
BUN/CREAT SERPL: 16.1 (ref 7–25)
CALCIUM SPEC-SCNC: 8 MG/DL (ref 8.6–10.5)
CHLORIDE SERPL-SCNC: 104 MMOL/L (ref 98–107)
CO2 SERPL-SCNC: 28 MMOL/L (ref 22–29)
COTININE SERPL-MCNC: <1 NG/ML
CREAT SERPL-MCNC: 0.97 MG/DL (ref 0.57–1)
EGFRCR SERPLBLD CKD-EPI 2021: 66.2 ML/MIN/1.73
GLUCOSE SERPL-MCNC: 150 MG/DL (ref 65–99)
HCT VFR BLD AUTO: 37.3 % (ref 34–46.6)
HGB BLD-MCNC: 11.8 G/DL (ref 12–15.9)
NICOTINE SERPL-MCNC: <1 NG/ML
POTASSIUM SERPL-SCNC: 4.4 MMOL/L (ref 3.5–5.2)
SODIUM SERPL-SCNC: 138 MMOL/L (ref 136–145)

## 2025-06-15 PROCEDURE — 80048 BASIC METABOLIC PNL TOTAL CA: CPT | Performed by: ORTHOPAEDIC SURGERY

## 2025-06-15 PROCEDURE — 97110 THERAPEUTIC EXERCISES: CPT

## 2025-06-15 PROCEDURE — 99024 POSTOP FOLLOW-UP VISIT: CPT | Performed by: ORTHOPAEDIC SURGERY

## 2025-06-15 PROCEDURE — 85018 HEMOGLOBIN: CPT | Performed by: ORTHOPAEDIC SURGERY

## 2025-06-15 PROCEDURE — 85014 HEMATOCRIT: CPT | Performed by: ORTHOPAEDIC SURGERY

## 2025-06-15 PROCEDURE — 25010000002 DAPTOMYCIN PER 1 MG: Performed by: INTERNAL MEDICINE

## 2025-06-15 PROCEDURE — 97530 THERAPEUTIC ACTIVITIES: CPT

## 2025-06-15 PROCEDURE — 97116 GAIT TRAINING THERAPY: CPT

## 2025-06-15 PROCEDURE — 25010000002 CEFTRIAXONE PER 250 MG: Performed by: INTERNAL MEDICINE

## 2025-06-15 RX ORDER — METHOCARBAMOL 500 MG/1
500 TABLET, FILM COATED ORAL EVERY 8 HOURS PRN
Status: DISCONTINUED | OUTPATIENT
Start: 2025-06-15 | End: 2025-06-16 | Stop reason: HOSPADM

## 2025-06-15 RX ADMIN — ALPRAZOLAM 0.25 MG: 0.25 TABLET ORAL at 10:54

## 2025-06-15 RX ADMIN — SODIUM CHLORIDE 2000 MG: 900 INJECTION INTRAVENOUS at 21:30

## 2025-06-15 RX ADMIN — Medication 3 ML: at 08:19

## 2025-06-15 RX ADMIN — LACTULOSE 10 G: 20 SOLUTION ORAL at 08:18

## 2025-06-15 RX ADMIN — METOCLOPRAMIDE 5 MG: 5 TABLET ORAL at 17:54

## 2025-06-15 RX ADMIN — OXYCODONE 5 MG: 5 TABLET ORAL at 01:07

## 2025-06-15 RX ADMIN — METOCLOPRAMIDE 5 MG: 5 TABLET ORAL at 08:18

## 2025-06-15 RX ADMIN — DAPTOMYCIN 500 MG: 500 INJECTION, POWDER, LYOPHILIZED, FOR SOLUTION INTRAVENOUS at 12:31

## 2025-06-15 RX ADMIN — OXYCODONE 5 MG: 5 TABLET ORAL at 21:41

## 2025-06-15 RX ADMIN — OXYCODONE 5 MG: 5 TABLET ORAL at 17:54

## 2025-06-15 RX ADMIN — LACTULOSE 10 G: 20 SOLUTION ORAL at 21:30

## 2025-06-15 RX ADMIN — PANTOPRAZOLE SODIUM 40 MG: 40 TABLET, DELAYED RELEASE ORAL at 08:20

## 2025-06-15 RX ADMIN — METHOCARBAMOL 500 MG: 500 TABLET ORAL at 23:18

## 2025-06-15 RX ADMIN — ALPRAZOLAM 0.25 MG: 0.25 TABLET ORAL at 00:10

## 2025-06-15 RX ADMIN — ALPRAZOLAM 0.25 MG: 0.25 TABLET ORAL at 23:18

## 2025-06-15 RX ADMIN — Medication 3 ML: at 21:30

## 2025-06-15 RX ADMIN — OXYCODONE 5 MG: 5 TABLET ORAL at 08:18

## 2025-06-15 RX ADMIN — OXYCODONE 5 MG: 5 TABLET ORAL at 12:31

## 2025-06-15 RX ADMIN — PANTOPRAZOLE SODIUM 40 MG: 40 TABLET, DELAYED RELEASE ORAL at 21:30

## 2025-06-15 NOTE — PLAN OF CARE
Goal Outcome Evaluation:  Plan of Care Reviewed With: patient        Progress: improving  Outcome Evaluation: Patient able to ambulate 100' CGA with FWW and complete HEP with increased time and effort. She demos good quad control with no LOB or buckling with mobility. Will plan to complete stair training in PM session. Continue to recommend D/C home with OPPT after stair training.    Anticipated Discharge Disposition (PT): home with assist, home with outpatient therapy services

## 2025-06-15 NOTE — PROGRESS NOTES
"IM progress note      Jeevan Cardoso  [id number]  1960     LOS: 2 days     Attending: Olaf Buck MD    Primary Care Provider: Carl Mcnamara MD      Chief Complaint/Reason for visit:  No chief complaint on file.      Subjective   A bit overwhelmed by the plan for home antibiotics where she has to self infuse.  Aside from that she is progressing well.  She had some difficulty with PT today because she \"overdid it yesterday\"  Objective        Visit Vitals  /53 (BP Location: Left arm, Patient Position: Lying)   Pulse 76   Temp 98.5 °F (36.9 °C) (Oral)   Resp 16   Ht 157.5 cm (62\")   Wt 78.9 kg (174 lb)   LMP  (LMP Unknown)   SpO2 93%   BMI 31.83 kg/m²     Temp (24hrs), Av.4 °F (36.9 °C), Min:98.2 °F (36.8 °C), Max:98.5 °F (36.9 °C)      Intake/Output:    Intake/Output Summary (Last 24 hours) at 6/15/2025 1530  Last data filed at 6/15/2025 0900  Gross per 24 hour   Intake 360 ml   Output 400 ml   Net -40 ml        Physical Therapy:    Goal Outcome Evaluation:  Plan of Care Reviewed With: patient  Progress: improving  Outcome Evaluation: Patient able to ambulate 100' CGA with FWW and complete HEP with increased time and effort. She demos good quad control with no LOB or buckling with mobility. Will plan to complete stair training in PM session. Continue to recommend D/C home with OPPT after stair training.     Anticipated Discharge Disposition (PT): home with assist, home with outpatient therapy services          Physical Exam:     General Appearance:    Alert, cooperative, in no acute distress   Head:    Normocephalic, without obvious abnormality, atraumatic    Lungs:     Normal effort, symmetric chest rise,  clear to      auscultation bilaterally              Heart:    Regular rhythm and normal rate, normal S1 and S2    Abdomen:     Normal bowel sounds, no masses, no organomegaly, soft        nontender, nondistended, no guarding, no rebound                tenderness   Extremities: Clean dry and " intact ace on left knee.  Peripheral nerve block catheter present.  Intact flexion and dorsiflexion bilateral feet.  No clubbing, cyanosis or edema.  No deformities.    Pulses:   Pulses palpable and equal bilaterally   Skin:   No bleeding, bruising or rash          Results Review:     I reviewed the patient's new clinical results.   Results from last 7 days   Lab Units 06/15/25  1424 06/14/25  0817 06/10/25  0913   WBC 10*3/mm3  --  8.35 4.35   HEMOGLOBIN g/dL 11.8* 11.5* 13.0   HEMATOCRIT % 37.3 34.8 40.3   PLATELETS 10*3/mm3  --  78* 91*     Results from last 7 days   Lab Units 06/15/25  1424 06/14/25  0817 06/13/25  1658 06/10/25  0913   SODIUM mmol/L 138 137  --  136   POTASSIUM mmol/L 4.4 4.1 4.2 4.0   CHLORIDE mmol/L 104 105  --  102   CO2 mmol/L 28.0 25.0  --  29.0   BUN mg/dL 15.6 16.0  --  13.8   CREATININE mg/dL 0.97 0.86  --  0.91   CALCIUM mg/dL 8.0* 8.1*  --  9.0   BILIRUBIN mg/dL  --  1.3*  --   --    ALK PHOS U/L  --  57  --   --    ALT (SGPT) U/L  --  8  --   --    AST (SGOT) U/L  --  24  --   --    GLUCOSE mg/dL 150* 196*  --  206*     I reviewed the patient's new imaging including images and reports.    All medications reviewed.   [START ON 6/16/2025] aspirin, 81 mg, Oral, Q12H  cefTRIAXone, 2,000 mg, Intravenous, Q24H  DAPTOmycin, 8 mg/kg (Adjusted), Intravenous, Q24H  furosemide, 20 mg, Oral, Daily  lactulose, 10 g, Oral, BID  metoclopramide, 5 mg, Oral, BID  nadolol, 40 mg, Oral, Nightly  pantoprazole, 40 mg, Oral, BID  sodium chloride, 3 mL, Intravenous, Q12H  spironolactone, 50 mg, Oral, Daily      albuterol, 2.5 mg, Q6H PRN  ALPRAZolam, 0.25 mg, TID PRN  HYDROmorphone, 0.5 mg, Q2H PRN   And  naloxone, 0.1 mg, Q5 Min PRN  labetalol, 10 mg, Q4H PRN  methocarbamol, 500 mg, Q8H PRN  ondansetron ODT, 4 mg, Q6H PRN   Or  ondansetron, 4 mg, Q6H PRN  oxyCODONE, 10 mg, Q4H PRN  oxyCODONE, 5 mg, Q4H PRN  sodium chloride, 500 mL, TID PRN  sodium chloride, 3-10 mL, PRN        Assessment & Plan       S/P   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, due to infection    Thrombocytopenia    Liver cirrhosis secondary to DARBY    Essential hypertension    GERD without esophagitis    Generalized anxiety disorder    Depression    HTN (hypertension), benign    Arthritis of knee    Infection of prosthetic left knee joint         Plan   1. PT/OT, protected  Weight bearing as tolerated LLE  2. Pain control-prns, ACB cath with ropivacaine infusion. Multimodal approach  3. IS-encourage  4. DVT proph-Mechanicals and asa  5. Bowel regimen  6. Resume home medications as appropriate  7. Monitor post-op labs  8. DC planning      - Hypertension:  Resume home medications as appropriate, formulary substitution when indicated.  Holding parameters.  PRN medications for elevated blood pressure.      -GERD:  Resume PPI.  Formulary substitution when indicated.     - Anxiety and depression: Maintained on home regimen     -Monitor operative cultures, perioperative antibiotics will be managed by Dr. Russ Ovalle with [place] [facility].  Expect a PICC line and several weeks of IV antibiotics following discharge     -Cirrhosis: Maintained on regimen including lactulose and diuretics and beta-blocker.    Joe Castano MD  06/15/25  15:30 EDT
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
## 2025-06-15 NOTE — PLAN OF CARE
Goal Outcome Evaluation:         VSS on RA. PRN PO pain medication administered and effective per pt. Infu block in place and infusing. Lactate elevated and Hospitalist and ID made aware. IVFs infused per order. Surgical dressing C/D/I. Up in chair. Up to BSC and voiding spontaneously. Turned q 2 hrs and skin interventions in place. Spouse at bedside. Call light in reach

## 2025-06-15 NOTE — CONSULTS
I visited this patient per her request, assessed need, and offered spiritual support, including active listening support and prayer planned for later today (patient needed to speak to other team members). The visit seemed helpful for the patient. I will continue to follow, but please re-consult if requested or if another need arises.

## 2025-06-15 NOTE — PLAN OF CARE
Goal Outcome Evaluation:  Plan of Care Reviewed With: patient, spouse        Progress: improving  Outcome Evaluation: Patient ambulated 110'+15'+15' CGA with FWW. She declined stair training multiple times this afternoon despite encouragement. Gives good effort with knee flexion exercises, still quite limited. IPPT remains indicated to address current deficits. Continue to recommend D/C home with OPPT. She understands she needs to complete stair training next session.    Anticipated Discharge Disposition (PT): home with assist, home with outpatient therapy services
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
## 2025-06-15 NOTE — PROGRESS NOTES
"CHIEF COMPLAINT: Follow-up for revision left knee arthroplasty    SUBJECTIVE  Resting in bed.  Reports pain left knee well-controlled.  No acute events overnight.  No new complaints.    PHYSICAL THERAPY PROGRESS  Outcome Evaluation: pm trteatment inc gait 70 ft, OOB in chair LAQ, SLR AP OOB in chair.  Transfered with supervision. (25 1607)     OBJECTIVE  Temp (24hrs), Av.6 °F (37 °C), Min:98.1 °F (36.7 °C), Max:99.7 °F (37.6 °C)    Blood pressure 108/53, pulse 76, temperature 98.5 °F (36.9 °C), temperature source Oral, resp. rate 16, height 157.5 cm (62\"), weight 78.9 kg (174 lb), SpO2 93%, not currently breastfeeding.    Lab Results (last 24 hours)       Procedure Component Value Units Date/Time    Tissue / Bone Culture - Synovium, Knee, Left [[id number]] Collected: 25 1133    Specimen: Synovium from Knee, Left Updated: 06/15/25 0920     Tissue Culture No growth at 2 days     Gram Stain Moderate (3+) WBCs seen      No organisms seen    Tissue / Bone Culture - Synovium, Knee, Left [[id number]] Collected: 25 1134    Specimen: Synovium from Knee, Left Updated: 06/15/25 0742     Tissue Culture No growth at 2 days     Gram Stain No WBCs or organisms seen    Tissue / Bone Culture - Tissue, Knee, Left [[id number]] Collected: 25 1150    Specimen: Tissue from Knee, Left Updated: 06/15/25 0739     Tissue Culture No growth at 2 days     Gram Stain Moderate (3+) WBCs seen      No organisms seen    Body Fluid Culture - Synovial Fluid, Knee, Left [[id number]] Collected: 25 1131    Specimen: Synovial Fluid from Knee, Left Updated: 06/15/25 0736     Body Fluid Culture No growth at 2 days     Gram Stain Moderate (3+) WBCs seen      No organisms seen    STAT Lactic Acid, Reflex [[id number]]  (Abnormal) Collected: 25    Specimen: Blood Updated: 25     Lactate 2.1 mmol/L      Comment: Falsely depressed results may occur on samples drawn from patients receiving N-Acetylcysteine (NAC) " or Metamizole.       STAT Lactic Acid, Reflex [[id number]]  (Abnormal) Collected: 06/14/25 1650    Specimen: Blood Updated: 06/14/25 1820     Lactate 3.3 mmol/L      Comment: Falsely depressed results may occur on samples drawn from patients receiving N-Acetylcysteine (NAC) or Metamizole.       STAT Lactic Acid, Reflex [[id number]]  (Abnormal) Collected: 06/14/25 1348    Specimen: Blood Updated: 06/14/25 1432     Lactate 2.8 mmol/L      Comment: Falsely depressed results may occur on samples drawn from patients receiving N-Acetylcysteine (NAC) or Metamizole.       AFB Culture - Synovial Fluid, Knee, Left [[id number]] Collected: 06/13/25 1131    Specimen: Synovial Fluid from Knee, Left Updated: 06/14/25 1206     AFB Stain No acid fast bacilli seen on concentrated smear    AFB Culture - Tissue, Knee, Left [[id number]] Collected: 06/13/25 1150    Specimen: Tissue from Knee, Left Updated: 06/14/25 1206     AFB Stain No acid fast bacilli seen on concentrated smear    Lactic Acid, Plasma [[id number]]  (Abnormal) Collected: 06/14/25 0817    Specimen: Blood Updated: 06/14/25 0945     Lactate 2.2 mmol/L      Comment: Falsely depressed results may occur on samples drawn from patients receiving N-Acetylcysteine (NAC) or Metamizole.                 PHYSICAL EXAM  Left lower extremity: Dressing in place with no drainage or spotting.  Intact EHL, FHL, tibialis anterior, and gastrocsoleus. Sensation intact to light touch in all distributions about the foot.  Palpable DP pulse in the foot.  Foot warm well-perfused.      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, due to infection    Thrombocytopenia    Liver cirrhosis secondary to DARBY    Essential hypertension    GERD without esophagitis    Generalized anxiety disorder    Depression    HTN (hypertension), benign    Arthritis of knee    Infection of prosthetic left knee joint      PLAN / DISPOSITION:  2 Days Post-Op revision left knee arthroplasty    Protected weight bearing  as tolerated left lower extremity, knee range of motion as tolerated  Pain control  Dressing CDI, please change if saturated  PT/OT for post op mobilization and medical equipment needs   Antibiotics directed by infectious disease.  Dr. Ovalle following.  Follow-up intraoperative cultures.  Cultures in prgress. NGTD.  SCD's bilateral lower extremities   Aspirin for DVT prophylaxis.  Will hold initiation of DVT prophylaxis until 6/16/2025 due to thrombocytopenia and wound drainage.  Social work for discharge planning.   Dressing to remain in place for 7 days. May remove on POD#7. If no drainage, may shower on POD#10. No submerging wound in water. If drainage is noted, sterile dressing should be placed and wound checked daily. No showering until wound has remained dry for 72 consecutive hours.   Follow up in 3 weeks for re-assessment.      Future Appointments   Date Time Provider Department Center   7/3/2025  1:50 PM Milagros Cuello PA-C [facility]   7/28/2025 10:30 AM Cameron Emerson MD [facility]   8/28/2025  3:15 PM David Bustillos MD [facility]      rounding for Dr. Heber Villeda MD  06/15/25  09:43 EDT
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
## 2025-06-15 NOTE — PLAN OF CARE
Problem: Adult Inpatient Plan of Care  Goal: Plan of Care Review  Outcome: Progressing  Flowsheets (Taken 6/15/2025 0546)  Progress: improving  Plan of Care Reviewed With: patient  Goal: Patient-Specific Goal (Individualized)  Outcome: Progressing  Goal: Absence of Hospital-Acquired Illness or Injury  Outcome: Progressing  Intervention: Identify and Manage Fall Risk  Recent Flowsheet Documentation  Taken 6/15/2025 0137 by Ross Jones RN  Safety Promotion/Fall Prevention: safety round/check completed  Taken 6/14/2025 2015 by Ross Jones RN  Safety Promotion/Fall Prevention: safety round/check completed  Intervention: Prevent Skin Injury  Recent Flowsheet Documentation  Taken 6/14/2025 2015 by Ross Jones RN  Body Position: legs elevated  Goal: Optimal Comfort and Wellbeing  Outcome: Progressing  Goal: Readiness for Transition of Care  Outcome: Progressing     Problem: Fall Injury Risk  Goal: Absence of Fall and Fall-Related Injury  Outcome: Progressing  Intervention: Promote Injury-Free Environment  Recent Flowsheet Documentation  Taken 6/15/2025 0137 by Ross Jones RN  Safety Promotion/Fall Prevention: safety round/check completed  Taken 6/14/2025 2015 by Ross Jones RN  Safety Promotion/Fall Prevention: safety round/check completed     Problem: Skin Injury Risk Increased  Goal: Skin Health and Integrity  Outcome: Progressing  Intervention: Optimize Skin Protection  Recent Flowsheet Documentation  Taken 6/14/2025 2015 by Ross Jones RN  Activity Management: up in chair  Head of Bed (HOB) Positioning: HOB elevated   Goal Outcome Evaluation:  Plan of Care Reviewed With: patient        Progress: improving

## 2025-06-16 ENCOUNTER — READMISSION MANAGEMENT (OUTPATIENT)
Dept: CALL CENTER | Facility: HOSPITAL | Age: 65
End: 2025-06-16
Payer: MEDICARE

## 2025-06-16 VITALS
DIASTOLIC BLOOD PRESSURE: 55 MMHG | HEART RATE: 83 BPM | TEMPERATURE: 98.4 F | SYSTOLIC BLOOD PRESSURE: 110 MMHG | RESPIRATION RATE: 16 BRPM | BODY MASS INDEX: 32.02 KG/M2 | WEIGHT: 174 LBS | OXYGEN SATURATION: 91 % | HEIGHT: 62 IN

## 2025-06-16 LAB
ALBUMIN SERPL-MCNC: 2.5 G/DL (ref 3.5–5.2)
ALBUMIN/GLOB SERPL: 0.8 G/DL
ALP SERPL-CCNC: 51 U/L (ref 39–117)
ALT SERPL W P-5'-P-CCNC: 7 U/L (ref 1–33)
ANION GAP SERPL CALCULATED.3IONS-SCNC: 7 MMOL/L (ref 5–15)
AST SERPL-CCNC: 24 U/L (ref 1–32)
BACTERIA SPEC AEROBE CULT: NORMAL
BACTERIA SPEC AEROBE CULT: NORMAL
BASOPHILS # BLD AUTO: 0.02 10*3/MM3 (ref 0–0.2)
BASOPHILS NFR BLD AUTO: 0.5 % (ref 0–1.5)
BILIRUB SERPL-MCNC: 1.2 MG/DL (ref 0–1.2)
BUN SERPL-MCNC: 14.4 MG/DL (ref 8–23)
BUN/CREAT SERPL: 18.7 (ref 7–25)
CALCIUM SPEC-SCNC: 7.8 MG/DL (ref 8.6–10.5)
CHLORIDE SERPL-SCNC: 104 MMOL/L (ref 98–107)
CK SERPL-CCNC: 40 U/L (ref 20–180)
CO2 SERPL-SCNC: 26 MMOL/L (ref 22–29)
CREAT SERPL-MCNC: 0.77 MG/DL (ref 0.57–1)
CRP SERPL-MCNC: 2.84 MG/DL (ref 0–0.5)
DEPRECATED RDW RBC AUTO: 59.4 FL (ref 37–54)
EGFRCR SERPLBLD CKD-EPI 2021: 87.3 ML/MIN/1.73
EOSINOPHIL # BLD AUTO: 0.13 10*3/MM3 (ref 0–0.4)
EOSINOPHIL NFR BLD AUTO: 3.3 % (ref 0.3–6.2)
ERYTHROCYTE [DISTWIDTH] IN BLOOD BY AUTOMATED COUNT: 15.9 % (ref 12.3–15.4)
GLOBULIN UR ELPH-MCNC: 3 GM/DL
GLUCOSE SERPL-MCNC: 111 MG/DL (ref 65–99)
GRAM STN SPEC: NORMAL
HCT VFR BLD AUTO: 32.8 % (ref 34–46.6)
HGB BLD-MCNC: 10.4 G/DL (ref 12–15.9)
IMM GRANULOCYTES # BLD AUTO: 0.01 10*3/MM3 (ref 0–0.05)
IMM GRANULOCYTES NFR BLD AUTO: 0.3 % (ref 0–0.5)
LYMPHOCYTES # BLD AUTO: 1.08 10*3/MM3 (ref 0.7–3.1)
LYMPHOCYTES NFR BLD AUTO: 27.3 % (ref 19.6–45.3)
MCH RBC QN AUTO: 32.4 PG (ref 26.6–33)
MCHC RBC AUTO-ENTMCNC: 31.7 G/DL (ref 31.5–35.7)
MCV RBC AUTO: 102.2 FL (ref 79–97)
MONOCYTES # BLD AUTO: 0.52 10*3/MM3 (ref 0.1–0.9)
MONOCYTES NFR BLD AUTO: 13.2 % (ref 5–12)
NEUTROPHILS NFR BLD AUTO: 2.19 10*3/MM3 (ref 1.7–7)
NEUTROPHILS NFR BLD AUTO: 55.4 % (ref 42.7–76)
NRBC BLD AUTO-RTO: 0 /100 WBC (ref 0–0.2)
PLATELET # BLD AUTO: 55 10*3/MM3 (ref 140–450)
PMV BLD AUTO: 10.1 FL (ref 6–12)
POTASSIUM SERPL-SCNC: 4.3 MMOL/L (ref 3.5–5.2)
PROT SERPL-MCNC: 5.5 G/DL (ref 6–8.5)
RBC # BLD AUTO: 3.21 10*6/MM3 (ref 3.77–5.28)
SODIUM SERPL-SCNC: 137 MMOL/L (ref 136–145)
WBC NRBC COR # BLD AUTO: 3.95 10*3/MM3 (ref 3.4–10.8)

## 2025-06-16 PROCEDURE — 85025 COMPLETE CBC W/AUTO DIFF WBC: CPT | Performed by: INTERNAL MEDICINE

## 2025-06-16 PROCEDURE — 63710000001 ONDANSETRON ODT 4 MG TABLET DISPERSIBLE: Performed by: ORTHOPAEDIC SURGERY

## 2025-06-16 PROCEDURE — 97110 THERAPEUTIC EXERCISES: CPT

## 2025-06-16 PROCEDURE — C1894 INTRO/SHEATH, NON-LASER: HCPCS

## 2025-06-16 PROCEDURE — 82550 ASSAY OF CK (CPK): CPT | Performed by: INTERNAL MEDICINE

## 2025-06-16 PROCEDURE — 86140 C-REACTIVE PROTEIN: CPT | Performed by: INTERNAL MEDICINE

## 2025-06-16 PROCEDURE — 99024 POSTOP FOLLOW-UP VISIT: CPT | Performed by: ORTHOPAEDIC SURGERY

## 2025-06-16 PROCEDURE — 25010000002 DAPTOMYCIN PER 1 MG: Performed by: INTERNAL MEDICINE

## 2025-06-16 PROCEDURE — 25010000002 CEFTRIAXONE PER 250 MG: Performed by: INTERNAL MEDICINE

## 2025-06-16 PROCEDURE — C1751 CATH, INF, PER/CENT/MIDLINE: HCPCS

## 2025-06-16 PROCEDURE — 97116 GAIT TRAINING THERAPY: CPT

## 2025-06-16 PROCEDURE — 02HV33Z INSERTION OF INFUSION DEVICE INTO SUPERIOR VENA CAVA, PERCUTANEOUS APPROACH: ICD-10-PCS | Performed by: ORTHOPAEDIC SURGERY

## 2025-06-16 PROCEDURE — 80053 COMPREHEN METABOLIC PANEL: CPT | Performed by: INTERNAL MEDICINE

## 2025-06-16 RX ORDER — SODIUM CHLORIDE 9 MG/ML
40 INJECTION, SOLUTION INTRAVENOUS AS NEEDED
Status: DISCONTINUED | OUTPATIENT
Start: 2025-06-16 | End: 2025-06-16 | Stop reason: HOSPADM

## 2025-06-16 RX ORDER — SODIUM CHLORIDE 0.9 % (FLUSH) 0.9 %
10 SYRINGE (ML) INJECTION AS NEEDED
Status: DISCONTINUED | OUTPATIENT
Start: 2025-06-16 | End: 2025-06-16 | Stop reason: HOSPADM

## 2025-06-16 RX ORDER — ROPIVACAINE HYDROCHLORIDE 2 MG/ML
2 INJECTION, SOLUTION EPIDURAL; INFILTRATION; PERINEURAL CONTINUOUS
Start: 2025-06-16

## 2025-06-16 RX ORDER — SODIUM CHLORIDE 0.9 % (FLUSH) 0.9 %
20 SYRINGE (ML) INJECTION AS NEEDED
Status: DISCONTINUED | OUTPATIENT
Start: 2025-06-16 | End: 2025-06-16 | Stop reason: HOSPADM

## 2025-06-16 RX ORDER — ASPIRIN 81 MG/1
81 TABLET ORAL EVERY 12 HOURS SCHEDULED
Qty: 60 TABLET | Refills: 0 | Status: SHIPPED | OUTPATIENT
Start: 2025-06-16

## 2025-06-16 RX ORDER — OXYCODONE HYDROCHLORIDE 5 MG/1
5 TABLET ORAL EVERY 4 HOURS PRN
Qty: 30 TABLET | Refills: 0 | Status: SHIPPED | OUTPATIENT
Start: 2025-06-16 | End: 2025-06-23

## 2025-06-16 RX ORDER — SODIUM CHLORIDE 0.9 % (FLUSH) 0.9 %
10 SYRINGE (ML) INJECTION EVERY 12 HOURS SCHEDULED
Status: DISCONTINUED | OUTPATIENT
Start: 2025-06-16 | End: 2025-06-16 | Stop reason: HOSPADM

## 2025-06-16 RX ADMIN — DAPTOMYCIN 500 MG: 500 INJECTION, POWDER, LYOPHILIZED, FOR SOLUTION INTRAVENOUS at 11:25

## 2025-06-16 RX ADMIN — ONDANSETRON 4 MG: 4 TABLET, ORALLY DISINTEGRATING ORAL at 11:25

## 2025-06-16 RX ADMIN — OXYCODONE 5 MG: 5 TABLET ORAL at 16:15

## 2025-06-16 RX ADMIN — LACTULOSE 10 G: 20 SOLUTION ORAL at 09:11

## 2025-06-16 RX ADMIN — METOCLOPRAMIDE 5 MG: 5 TABLET ORAL at 09:11

## 2025-06-16 RX ADMIN — Medication 10 ML: at 16:15

## 2025-06-16 RX ADMIN — PANTOPRAZOLE SODIUM 40 MG: 40 TABLET, DELAYED RELEASE ORAL at 09:11

## 2025-06-16 RX ADMIN — SODIUM CHLORIDE 2000 MG: 900 INJECTION INTRAVENOUS at 16:14

## 2025-06-16 RX ADMIN — ASPIRIN 81 MG: 81 TABLET, COATED ORAL at 09:11

## 2025-06-16 RX ADMIN — Medication 3 ML: at 09:51

## 2025-06-16 RX ADMIN — ALPRAZOLAM 0.25 MG: 0.25 TABLET ORAL at 12:49

## 2025-06-16 NOTE — PLAN OF CARE
Goal Outcome Evaluation:              Outcome Evaluation: Patient discharged home with spouse. PICC placed and dressing had small amount of dried drainage noted at time of d/c. Rocephin infused prior to d/c. Ace wrap surgical dressing C/D/I. Ambulated to bathroom with 1 assist, gait belt, and walker and voiding spontaneously. PRN PO pain medication administered and effective per pt. Infu block removed prior to d/c at patient's request

## 2025-06-16 NOTE — PROGRESS NOTES
[facility]    Acute pain service Inpatient Progress Note    Patient Name: Jeevan Cardoso  :  1960  MRN:  [id number]        Acute Pain  Service Inpatient Progress Note:    Analgesia:Good  Pain Score:2/10  LOC: alert and awake  Resp Status: room air  Cardiac: VS stable  Side Effects:None  Catheter Site:clean, dressing intact and dry  Cath type: peripheral nerve cath(InfuSystem)  Volume: 1mL,8ml, 8ml InfuSystem Pump.  Catheter Plan:Catheter to remain Insitu and Continue catheter infusion rate unchanged  Comments:

## 2025-06-16 NOTE — DISCHARGE PLACEMENT REQUEST
"Noe Cardoso \"GREG\" (64 y.o. Female)       Date of Birth   1960    Social Security Number       Address   [address] [address]    Home Phone   [phone]    MRN   [id number]       Yazdanism   Yarsanism    Marital Status                               Admission Date   6/13/2025    Admission Type   Elective    Admitting Provider   Olaf Buck MD    Attending Provider   Olaf Buck MD    Department, Room/Bed   [address], S385/1       Discharge Date       Discharge Disposition   Home or Self Care    Discharge Destination                                 Attending Provider: Olaf Buck MD    Allergies: Penicillins, Cephalosporins, Morphine, Vancomycin, Hydromorphone, Tramadol, Acetaminophen, Cefaclor, Fentanyl, Levofloxacin, Omnicef [Cefdinir], Oxycodone-acetaminophen, Pentazocine    Isolation: None   Infection: None   Code Status: CPR    Ht: 157.5 cm (62\")   Wt: 78.9 kg (174 lb)    Admission Cmt: None   Principal Problem: 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, due to infection [Z96.652]                   Active Insurance as of 6/13/2025       Primary Coverage       Payor Plan Insurance Group Employer/Plan Group    AETNA MEDICARE REPLACEMENT AETNA MEDICARE ADVANTAGE PPO [id number]       Payor Plan Address Payor Plan Phone Number Payor Plan Fax Number Effective Dates    [address] [phone]  10/1/2023 - None Entered    [facility] [id number]         Subscriber Name Subscriber Birth Date Member ID       NOE CARDOSO 1960 [id number]                     Emergency Contacts        (Rel.) Home Phone Work Phone Mobile Phone    Conrad Cardoso (Spouse) [phone]    Vale soni (Daughter) [phone] -- [phone]             [address]  [address] [facility] [address]  Phone:  [phone]  Fax:  [phone]        Patient: ROOM: " S385-1   Jeevan Cardoso MRN:  [id number]   [address] [place] [address] :  1960  SSN:    Phone: [phone] Sex:  F   PCP: Carl Mcnamara                Emergency Contact Information      Name Relation Home Work Mobile     Conrad Cardoso Spouse [phone]     DoVale soni Daughter [phone]       Other Contacts    None on File         INSURANCE PAYOR PLAN GROUP # SUBSCRIBER ID   Primary:    [employer] MEDICARE REPLACEMENT [id number]   Admitting Diagnosis: 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  Check CBC, CMP, CRP, CPK weekly while on IV antibiotics.  Fax orders to [phone], call [phone] with final arrangements.  Arrange for follow-up with me in 1 week postdischarge.  Weekly PICC dressing changes.  Discussed with Dr. Buck.  Reason for consult? Other (see comments)  Comments: abx        Authorizing Provider:Russ Ovalle MD  Authorizing Provider's NPI: [id number]  Order Entered By: Russ Ovalle MD 2025 10:22 AM     Electronically signed by: Russ Ovalle MD 2025 10:22 AM          Physician Progress Notes (last 24 hours)        Olaf Buck MD at 25 0710          CHIEF COMPLAINT: Follow-up for revision left knee arthroplasty    SUBJECTIVE  Patient resting comfortably.  Pain well-controlled.  No events overnight.    PHYSICAL THERAPY PROGRESS  Outcome Evaluation: Patient ambulated 110'+15'+15'  "CGA with FWW. She declined stair training multiple times this afternoon despite encouragement. Gives good effort with knee flexion exercises, still quite limited. IPPT remains indicated to address current deficits. Continue to recommend D/C home with OPPT. She understands she needs to complete stair training next session. (06/15/25 0576)     OBJECTIVE  Temp (24hrs), Av.7 °F (37.1 °C), Min:98.5 °F (36.9 °C), Max:98.9 °F (37.2 °C)    Blood pressure 106/49, pulse 88, temperature 98.6 °F (37 °C), temperature source Oral, resp. rate 16, height 157.5 cm (62\"), weight 78.9 kg (174 lb), SpO2 93%, not currently breastfeeding.    Lab Results (last 24 hours)       Procedure Component Value Units Date/Time    CBC & Differential [[id number]] Collected: 25    Specimen: Blood Updated: 25    Narrative:      The following orders were created for panel order CBC & Differential.  Procedure                               Abnormality         Status                     ---------                               -----------         ------                     CBC Auto Differential[[id number]]                            In process                   Please view results for these tests on the individual orders.    CBC Auto Differential [[id number]] Collected: 25    Specimen: Blood Updated: 2545    CK [[id number]] Collected: 25    Specimen: Blood Updated: 25    Comprehensive Metabolic Panel [[id number]] Collected: 25    Specimen: Blood Updated: 25    C-reactive Protein [[id number]] Collected: 25    Specimen: Blood Updated: 25    Basic Metabolic Panel [[id number]]  (Abnormal) Collected: 06/15/25 1424    Specimen: Blood Updated: 06/15/25 1501     Glucose 150 mg/dL      BUN 15.6 mg/dL      Creatinine 0.97 mg/dL      Sodium 138 mmol/L      Potassium 4.4 mmol/L      Chloride 104 mmol/L      CO2 28.0 mmol/L      Calcium 8.0 mg/dL      BUN/Creatinine " Ratio 16.1     Anion Gap 6.0 mmol/L      eGFR 66.2 mL/min/1.73     Narrative:      GFR Categories in Chronic Kidney Disease (CKD)              GFR Category          GFR (mL/min/1.73)    Interpretation  G1                    90 or greater        Normal or high (1)  G2                    60-89                Mild decrease (1)  G3a                   45-59                Mild to moderate decrease  G3b                   30-44                Moderate to severe decrease  G4                    15-29                Severe decrease  G5                    14 or less           Kidney failure    (1)In the absence of evidence of kidney disease, neither GFR category G1 or G2 fulfill the criteria for CKD.    eGFR calculation 2021 CKD-EPI creatinine equation, which does not include race as a factor    Hemoglobin & Hematocrit, Blood [[id number]]  (Abnormal) Collected: 06/15/25 1424    Specimen: Blood Updated: 06/15/25 1442     Hemoglobin 11.8 g/dL      Hematocrit 37.3 %     Tissue / Bone Culture - Synovium, Knee, Left [[id number]] Collected: 06/13/25 1133    Specimen: Synovium from Knee, Left Updated: 06/15/25 0920     Tissue Culture No growth at 2 days     Gram Stain Moderate (3+) WBCs seen      No organisms seen    Tissue / Bone Culture - Synovium, Knee, Left [[id number]] Collected: 06/13/25 1134    Specimen: Synovium from Knee, Left Updated: 06/15/25 0742     Tissue Culture No growth at 2 days     Gram Stain No WBCs or organisms seen    Tissue / Bone Culture - Tissue, Knee, Left [[id number]] Collected: 06/13/25 1150    Specimen: Tissue from Knee, Left Updated: 06/15/25 0739     Tissue Culture No growth at 2 days     Gram Stain Moderate (3+) WBCs seen      No organisms seen    Body Fluid Culture - Synovial Fluid, Knee, Left [[id number]] Collected: 06/13/25 1131    Specimen: Synovial Fluid from Knee, Left Updated: 06/15/25 0736     Body Fluid Culture No growth at 2 days     Gram Stain Moderate (3+) WBCs seen      No organisms seen               PHYSICAL EXAM  Left lower extremity: Dressing clean, dry and intact.  Able to form straight leg raise without assist.  Intact EHL, FHL, tibialis anterior, and gastrocsoleus. Sensation intact to light touch to deep peroneal, superficial peroneal, sural, saphenous, tibial nerves. 2+ palpable DP and PT pulses.         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  PT/OT for post op mobilization and medical equipment needs   Antibiotics as directed by infectious disease.  Dr. Ovalle following.  Follow-up intraoperative cultures.  Cultures pending-no growth to date.  SCD's bilateral lower extremities   Aspirin for DVT prophylaxis.  Will hold initiation of DVT prophylaxis until 6/16/2025 due to thrombocytopenia and wound drainage.  Social work for discharge planning.   Dressing to remain in place for 7 days. May remove on POD#7. If no drainage, may shower on POD#10. No submerging wound in water. If drainage is noted, sterile dressing should be placed and wound checked daily. No showering until wound has remained dry for 72 consecutive hours.   Follow up in 3 weeks for re-assessment.      Future Appointments   Date Time Provider Department Center   7/3/2025  1:50 PM Milagros Cuello PA-C [facility]   7/28/2025 10:30 AM Cameron Emerson MD [facility]   8/28/2025  3:15 PM David Bustillos MD [facility]       Olaf Buck MD  06/16/25  07:10 EDT  "          Electronically signed by Olaf Buck MD at 25 0711       Joe Castano MD at 06/15/25 1530          IM progress note      Jeevan Cardoso  [id number]  1960     LOS: 2 days     Attending: Olaf Buck MD    Primary Care Provider: Carl Mcnamara MD      Chief Complaint/Reason for visit:  No chief complaint on file.      Subjective   A bit overwhelmed by the plan for home antibiotics where she has to self infuse.  Aside from that she is progressing well.  She had some difficulty with PT today because she \"overdid it yesterday\"  Objective        Visit Vitals  /53 (BP Location: Left arm, Patient Position: Lying)   Pulse 76   Temp 98.5 °F (36.9 °C) (Oral)   Resp 16   Ht 157.5 cm (62\")   Wt 78.9 kg (174 lb)   LMP  (LMP Unknown)   SpO2 93%   BMI 31.83 kg/m²     Temp (24hrs), Av.4 °F (36.9 °C), Min:98.2 °F (36.8 °C), Max:98.5 °F (36.9 °C)      Intake/Output:    Intake/Output Summary (Last 24 hours) at 6/15/2025 1530  Last data filed at 6/15/2025 0900  Gross per 24 hour   Intake 360 ml   Output 400 ml   Net -40 ml        Physical Therapy:    Goal Outcome Evaluation:  Plan of Care Reviewed With: patient  Progress: improving  Outcome Evaluation: Patient able to ambulate 100' CGA with FWW and complete HEP with increased time and effort. She demos good quad control with no LOB or buckling with mobility. Will plan to complete stair training in PM session. Continue to recommend D/C home with OPPT after stair training.     Anticipated Discharge Disposition (PT): home with assist, home with outpatient therapy services          Physical Exam:     General Appearance:    Alert, cooperative, in no acute distress   Head:    Normocephalic, without obvious abnormality, atraumatic    Lungs:     Normal effort, symmetric chest rise,  clear to      auscultation bilaterally              Heart:    Regular rhythm and normal rate, normal S1 and S2    Abdomen:     Normal bowel sounds, no masses, " no organomegaly, soft        nontender, nondistended, no guarding, no rebound                tenderness   Extremities: Clean dry and intact ace on left knee.  Peripheral nerve block catheter present.  Intact flexion and dorsiflexion bilateral feet.  No clubbing, cyanosis or edema.  No deformities.    Pulses:   Pulses palpable and equal bilaterally   Skin:   No bleeding, bruising or rash          Results Review:     I reviewed the patient's new clinical results.   Results from last 7 days   Lab Units 06/15/25  1424 06/14/25  0817 06/10/25  0913   WBC 10*3/mm3  --  8.35 4.35   HEMOGLOBIN g/dL 11.8* 11.5* 13.0   HEMATOCRIT % 37.3 34.8 40.3   PLATELETS 10*3/mm3  --  78* 91*     Results from last 7 days   Lab Units 06/15/25  1424 06/14/25  0817 06/13/25  1658 06/10/25  0913   SODIUM mmol/L 138 137  --  136   POTASSIUM mmol/L 4.4 4.1 4.2 4.0   CHLORIDE mmol/L 104 105  --  102   CO2 mmol/L 28.0 25.0  --  29.0   BUN mg/dL 15.6 16.0  --  13.8   CREATININE mg/dL 0.97 0.86  --  0.91   CALCIUM mg/dL 8.0* 8.1*  --  9.0   BILIRUBIN mg/dL  --  1.3*  --   --    ALK PHOS U/L  --  57  --   --    ALT (SGPT) U/L  --  8  --   --    AST (SGOT) U/L  --  24  --   --    GLUCOSE mg/dL 150* 196*  --  206*     I reviewed the patient's new imaging including images and reports.    All medications reviewed.   [START ON 6/16/2025] aspirin, 81 mg, Oral, Q12H  cefTRIAXone, 2,000 mg, Intravenous, Q24H  DAPTOmycin, 8 mg/kg (Adjusted), Intravenous, Q24H  furosemide, 20 mg, Oral, Daily  lactulose, 10 g, Oral, BID  metoclopramide, 5 mg, Oral, BID  nadolol, 40 mg, Oral, Nightly  pantoprazole, 40 mg, Oral, BID  sodium chloride, 3 mL, Intravenous, Q12H  spironolactone, 50 mg, Oral, Daily      albuterol, 2.5 mg, Q6H PRN  ALPRAZolam, 0.25 mg, TID PRN  HYDROmorphone, 0.5 mg, Q2H PRN   And  naloxone, 0.1 mg, Q5 Min PRN  labetalol, 10 mg, Q4H PRN  methocarbamol, 500 mg, Q8H PRN  ondansetron ODT, 4 mg, Q6H PRN   Or  ondansetron, 4 mg, Q6H PRN  oxyCODONE, 10 mg,  Q4H PRN  oxyCODONE, 5 mg, Q4H PRN  sodium chloride, 500 mL, TID PRN  sodium chloride, 3-10 mL, PRN        Assessment & Plan       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 PT/OT, protected  Weight bearing as tolerated LLE  2. Pain control-prns, ACB cath with ropivacaine infusion. Multimodal approach  3. IS-encourage  4. DVT proph-Mechanicals and asa  5. Bowel regimen  6. Resume home medications as appropriate  7. Monitor post-op labs  8. DC planning      - Hypertension:  Resume home medications as appropriate, formulary substitution when indicated.  Holding parameters.  PRN medications for elevated blood pressure.      -GERD:  Resume PPI.  Formulary substitution when indicated.     - Anxiety and depression: Maintained on home regimen     -Monitor operative cultures, perioperative antibiotics will be managed by Dr. Russ Ovalle with [facility].  Expect a PICC line and several weeks of IV antibiotics following discharge     -Cirrhosis: Maintained on regimen including lactulose and diuretics and beta-blocker.    Joe Castano MD  06/15/25  15:30 EDT      Electronically signed by Joe Castano MD at 06/15/25 1531          Consult Notes (last 48 hours)        Geena Ludwig at 06/15/25 1430        Consult Orders    1. Inpatient Spiritual Care Consult [[id number]] ordered by Joe Castano MD at 06/13/25 1703              Summary:Spiritual Care Visit                 I visited this patient per her request, assessed need, and offered spiritual support, including active listening support and prayer planned for later today (patient needed to speak to other team members). The visit seemed  helpful for the patient. I will continue to follow, but please re-consult if requested or if another need arises.     Electronically signed by eGena Ludwig at 06/15/25 4821
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
## 2025-06-16 NOTE — PLAN OF CARE
Goal Outcome Evaluation:  Plan of Care Reviewed With: patient        Progress: improving  Outcome Evaluation: Pt ambulated 90 ft w/ FWW, CGA, and navigated 3 steps w/ L handrail use and R HHA, CGA. No LOB or knee buckling noted. Pt would continue to benefit from IP PT services while hospitalized to address deficits. Pt is functionally cleared by PT after completion of stair training. Recommend d/c home w/ assist and OP PT once medically appropriate.    Anticipated Discharge Disposition (PT): home with assist, home with outpatient therapy services

## 2025-06-16 NOTE — PROGRESS NOTES
"CHIEF COMPLAINT: Follow-up for revision left knee arthroplasty    SUBJECTIVE  Patient resting comfortably.  Pain well-controlled.  No events overnight.    PHYSICAL THERAPY PROGRESS  Outcome Evaluation: Patient ambulated 110'+15'+15' CGA with FWW. She declined stair training multiple times this afternoon despite encouragement. Gives good effort with knee flexion exercises, still quite limited. IPPT remains indicated to address current deficits. Continue to recommend D/C home with OPPT. She understands she needs to complete stair training next session. (06/15/25 1607)     OBJECTIVE  Temp (24hrs), Av.7 °F (37.1 °C), Min:98.5 °F (36.9 °C), Max:98.9 °F (37.2 °C)    Blood pressure 106/49, pulse 88, temperature 98.6 °F (37 °C), temperature source Oral, resp. rate 16, height 157.5 cm (62\"), weight 78.9 kg (174 lb), SpO2 93%, not currently breastfeeding.    Lab Results (last 24 hours)       Procedure Component Value Units Date/Time    CBC & Differential [[id number]] Collected: 25    Specimen: Blood Updated: 25    Narrative:      The following orders were created for panel order CBC & Differential.  Procedure                               Abnormality         Status                     ---------                               -----------         ------                     CBC Auto Differential[[id number]]                            In process                   Please view results for these tests on the individual orders.    CBC Auto Differential [[id number]] Collected: 25    Specimen: Blood Updated: 2545    CK [[id number]] Collected: 25    Specimen: Blood Updated: 25    Comprehensive Metabolic Panel [[id number]] Collected: 25    Specimen: Blood Updated: 25    C-reactive Protein [[id number]] Collected: 25    Specimen: Blood Updated: 25    Basic Metabolic Panel [[id number]]  (Abnormal) Collected: 06/15/25 1424    Specimen: " Blood Updated: 06/15/25 1501     Glucose 150 mg/dL      BUN 15.6 mg/dL      Creatinine 0.97 mg/dL      Sodium 138 mmol/L      Potassium 4.4 mmol/L      Chloride 104 mmol/L      CO2 28.0 mmol/L      Calcium 8.0 mg/dL      BUN/Creatinine Ratio 16.1     Anion Gap 6.0 mmol/L      eGFR 66.2 mL/min/1.73     Narrative:      GFR Categories in Chronic Kidney Disease (CKD)              GFR Category          GFR (mL/min/1.73)    Interpretation  G1                    90 or greater        Normal or high (1)  G2                    60-89                Mild decrease (1)  G3a                   45-59                Mild to moderate decrease  G3b                   30-44                Moderate to severe decrease  G4                    15-29                Severe decrease  G5                    14 or less           Kidney failure    (1)In the absence of evidence of kidney disease, neither GFR category G1 or G2 fulfill the criteria for CKD.    eGFR calculation 2021 CKD-EPI creatinine equation, which does not include race as a factor    Hemoglobin & Hematocrit, Blood [[id number]]  (Abnormal) Collected: 06/15/25 1424    Specimen: Blood Updated: 06/15/25 1442     Hemoglobin 11.8 g/dL      Hematocrit 37.3 %     Tissue / Bone Culture - Synovium, Knee, Left [[id number]] Collected: 06/13/25 1133    Specimen: Synovium from Knee, Left Updated: 06/15/25 0920     Tissue Culture No growth at 2 days     Gram Stain Moderate (3+) WBCs seen      No organisms seen    Tissue / Bone Culture - Synovium, Knee, Left [[id number]] Collected: 06/13/25 1134    Specimen: Synovium from Knee, Left Updated: 06/15/25 0742     Tissue Culture No growth at 2 days     Gram Stain No WBCs or organisms seen    Tissue / Bone Culture - Tissue, Knee, Left [[id number]] Collected: 06/13/25 1150    Specimen: Tissue from Knee, Left Updated: 06/15/25 0739     Tissue Culture No growth at 2 days     Gram Stain Moderate (3+) WBCs seen      No organisms seen    Body Fluid Culture -  Synovial Fluid, Knee, Left [[id number]] Collected: 06/13/25 1131    Specimen: Synovial Fluid from Knee, Left Updated: 06/15/25 0736     Body Fluid Culture No growth at 2 days     Gram Stain Moderate (3+) WBCs seen      No organisms seen              PHYSICAL EXAM  Left lower extremity: Dressing clean, dry and intact.  Able to form straight leg raise without assist.  Intact EHL, FHL, tibialis anterior, and gastrocsoleus. Sensation intact to light touch to deep peroneal, superficial peroneal, sural, saphenous, tibial nerves. 2+ palpable DP and PT pulses.         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  PT/OT for post op mobilization and medical equipment needs   Antibiotics as directed by infectious disease.  Dr. Ovalle following.  Follow-up intraoperative cultures.  Cultures pending-no growth to date.  SCD's bilateral lower extremities   Aspirin for DVT prophylaxis.  Will hold initiation of DVT prophylaxis until 6/16/2025 due to thrombocytopenia and wound drainage.  Social work for discharge planning.   Dressing to remain in place for 7 days. May remove on POD#7. If no drainage, may shower on POD#10. No submerging wound in water. If drainage is noted, sterile dressing should be placed and wound checked daily. No showering until wound has remained dry for 72 consecutive hours.   Follow up in 3 weeks for re-assessment.      Future Appointments   Date Time Provider Department Center    7/3/2025  1:50 PM Milagros Cuello PA-C [facility]   7/28/2025 10:30 AM Cameron Emerson MD [facility]   8/28/2025  3:15 PM David Bustillos MD [facility]       Olaf Buck MD  06/16/25  07:10 EDT

## 2025-06-16 NOTE — CASE MANAGEMENT/SOCIAL WORK
Continued Stay Note  [facility]     Patient Name: Jeevan Cardoso  MRN: [id number]  Today's Date: 6/16/2025    Admit Date: 6/13/2025    Plan: Home   Discharge Plan       Row Name 06/16/25 1322       Plan    Plan Home    Plan Comments Confirmed with lorena she will do IV abx infusions daily at [facility] office;she has an appointment on June 17 at 1330.  Discussed with patient that she cannot have home health while doing outpatient visits with [facility].  IMM given.   Patient denies other needs.    Final Discharge Disposition Code 01 - home or self-care                   Discharge Codes    No documentation.                       Imelda Anand RN

## 2025-06-16 NOTE — PLAN OF CARE
Problem: Adult Inpatient Plan of Care  Goal: Absence of Hospital-Acquired Illness or Injury  Outcome: Progressing  Intervention: Identify and Manage Fall Risk  Recent Flowsheet Documentation  Taken 6/16/2025 0030 by Honey Guzman RN  Safety Promotion/Fall Prevention: safety round/check completed  Taken 6/15/2025 2000 by Honey Guzman RN  Safety Promotion/Fall Prevention:   assistive device/personal items within reach   clutter free environment maintained   fall prevention program maintained   nonskid shoes/slippers when out of bed   room organization consistent   safety round/check completed  Intervention: Prevent Skin Injury  Recent Flowsheet Documentation  Taken 6/16/2025 0100 by Honey Guzman RN  Body Position:   left   turned  Taken 6/16/2025 0030 by Honey Guzman RN  Body Position: position maintained  Taken 6/15/2025 2000 by Honey Guzman RN  Body Position: position maintained  Intervention: Prevent and Manage VTE (Venous Thromboembolism) Risk  Recent Flowsheet Documentation  Taken 6/16/2025 0030 by Honey Guzman RN  VTE Prevention/Management:   right   SCDs (sequential compression devices) on  Taken 6/15/2025 2000 by Honey Guzman RN  VTE Prevention/Management:   right   SCDs (sequential compression devices) on  Intervention: Prevent Infection  Recent Flowsheet Documentation  Taken 6/15/2025 2000 by Honey Guzman RN  Infection Prevention:   environmental surveillance performed   rest/sleep promoted   single patient room provided   Goal Outcome Evaluation:

## 2025-06-17 LAB
BACTERIA SPEC AEROBE CULT: ABNORMAL
GRAM STN SPEC: ABNORMAL
GRAM STN SPEC: ABNORMAL

## 2025-06-17 NOTE — PAYOR COMM NOTE
"Ref# [id number]   Discharge Summary    SUSHILA Alejandre, RN  Utilization Review  Phone [phone]  Fax [phone]    [facility]  [address] [place] [address]       Noe Cardoso \"GREG\" (64 y.o. Female)       Date of Birth   1960    Social Security Number       Address   [address] [id number]    Home Phone   [phone]    MRN   [id number]       Restorationism   Latter day    Marital Status                               Admission Date   6/13/2025    Admission Type   Elective    Admitting Provider   Olaf Buck MD    Attending Provider       Department, Room/Bed   [facility] 3H, S385/1       Discharge Date   6/16/2025    Discharge Disposition   Home or Self Care    Discharge Destination                                 Attending Provider: (none)   Allergies: Penicillins, Cephalosporins, Morphine, Vancomycin, Hydromorphone, Tramadol, Acetaminophen, Cefaclor, Fentanyl, Levofloxacin, Omnicef [Cefdinir], Oxycodone-acetaminophen, Pentazocine    Isolation: None   Infection: None   Code Status: Prior    Ht: 157.5 cm (62\")   Wt: 78.9 kg (174 lb)    Admission Cmt: None   Principal Problem: 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, due to infection [Z96.652]                   Active Insurance as of 6/13/2025       Primary Coverage       Payor Plan Insurance Group Employer/Plan Group    AETNA MEDICARE REPLACEMENT AETNA MEDICARE ADVANTAGE PPO [id number]       Payor Plan Address Payor Plan Phone Number Payor Plan Fax Number Effective Dates    [address] [phone]  10/1/2023 - None Entered    [facility] [id number]         Subscriber Name Subscriber Birth Date Member ID       NOE CARDOSO 1960 [id number]                     Emergency Contacts        (Rel.) Home Phone Work Phone Mobile Phone    Conrad Cardoso (Spouse) [phone]    CésarVale raya (Daughter) [phone] " -- [phone]                 Discharge Summary        Joe Castano MD at 25 1820          Patient Name: Jeevan Cardoso  MRN: [id number]  : 1960  DOS: 2025    Attending: No att. providers found    Primary Care Provider: Carl Mcnamara MD    Date of Admission:.2025  7:27 AM    Date of Discharge:  2025    Discharge Diagnosis:   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, due to infection    Thrombocytopenia    Liver cirrhosis secondary to DARBY    Essential hypertension    GERD without esophagitis    Generalized anxiety disorder    Depression    HTN (hypertension), benign    Arthritis of knee    Infection of prosthetic left knee joint  Status post PICC placement    Consults: Infectious disease, Russ Ovalle MD    Hospital Course    At admit:  Patient is a pleasant 64 y.o. female presented for scheduled surgery by Dr. Buck.     Per his note: (The patient had an uneventful postoperative course after the original left knee arthroplasty and no evidence of wound issues or problems. Early recovery proceeded as expected. Patient was doing well at a year postop. However approximately a month later she developed a wound at the distal lateral aspect of the knee overlying Norma's tubercle. Debridement was performed by primary care followed by dermatologist. She subsequently was seen by me without my knowledge of the prior interventions. Based on the location of the wound relative to the knee joint, there was concern of communication with this wound to the knee joint. A specific clinical evaluation protocol was initiated to evaluate the painful arthroplasty for the possibility of prosthetic joint/implant infection. Acute phase reactant tests were performed and included a CBC, ESR, and CRP. These acute phase reactants were abnormal and elevated, suggestive of an infection. A knee joint aspiration was then performed. The fluid was cloudy/purulent with the cell count and  differential abnormally elevated and indicative of an ongoing infection. Cultures from the aspiration were sent, and revealed staph species. 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 Based on the laboratory evidence and the patient history, a chronic infection was favored. The recommendation was for a complete revision of the left knee arthroplasty and placement of an antibiotic eluting revision total knee arthroplasty to treat the deep infection. The need to remove the entire implant and any necrotic bone and/or tissue significantly infiltrated by the bacteria was also discussed. The need for possible weight bearing restrictions after surgery depending on host bone quality was outlined following the procedure to allow for appropriate wound and tissue healing. The 2-stage re-implantation process and multidisciplinary team approach to treatment was also described with physician teams identified. We discussed that joint reconstruction would only occur if/when there is adequate clinical verification the infection has been eradicated. We then had a long discussion about the extensive risks, benefits, and complications related to the hardware removal; specifically degradation of femoral and tibial bone after cement removal. Other potential risks of the surgical treatment were outlined and included but were not limited to femur fracture, tibia fracture, persistent infection, bone loss, bleeding, anesthesia risks, nerve injury, vessel injury, instability/dislocation, pain, blood clots, possible need for blood transfusion, possible need for further procedures, myocardial infarction, stroke, and death. Understanding of all topics was conveyed to me by the patient pre-operatively, and patient consent was given to proceed with the infected knee arthroplasty removal, radical debridement, placement of an antibiotic eluting revision left total knee arthroplasty. )      Patient underwent complex revision left total knee arthroplasty under general anesthesia, tolerated surgery well.       Adductor canal nerve block catheter was placed by acute pain service, and patient was admitted for further management.     Seen postoperatively, doing well with good pain control, no complaints of nausea, vomiting, or shortness of breath.  Patient has no history of DVT or PE.     Reviewed with patient past medical history and home medications she has history of Raphael cirrhosis.      After admit:    Patient was provided pain medications as needed for pain control, along with adductor canal nerve block infusion of Ropivacaine.    Adjustments were made to pain medications to optimize postop pain management. Risks and benefits of opiate medications discussed with patient. HECTOR report was reviewed.    She was seen by PT and OT and has progressed well over her stay.    She used an IS for atelectasis prophylaxis and aspirin along with mechanicals for DVT prophylaxis.  Aspirin was held until 6/16/2025 due to low platelets and wound oozing.      She received IV antibiotics under the direction of Dr. Ovalle with [facility].  Cultures remain negative to date.  PICC line was placed in expectation of 6 weeks of IV antibiotics following discharge.  She tolerated antibiotics without side effects.    Home medications were resumed as appropriate, and labs were monitored and remained fairly stable.  Noting patient has baseline thrombocytopenia.    With the progress she has made, patient is ready for DC home today.      She will have an Infupump ( instructed on it during this admit).    Discussed with patient regarding plan and she shows understanding and agreement.    Patient will have PT following discharge.        Procedures Performed  Procedure(s):  TOTAL KNEE ARTHROPLASTY REVISION       Pertinent Test Results:    I reviewed the patient's new clinical results.   Results from last 7 days   Lab Units  "06/16/25  0536 06/15/25  1424 06/14/25  0817 06/10/25  0913   WBC 10*3/mm3 3.95  --  8.35 4.35   HEMOGLOBIN g/dL 10.4* 11.8* 11.5* 13.0   HEMATOCRIT % 32.8* 37.3 34.8 40.3   PLATELETS 10*3/mm3 55*  --  78* 91*     Results from last 7 days   Lab Units 06/16/25  0536 06/15/25  1424 06/14/25  0817   SODIUM mmol/L 137 138 137   POTASSIUM mmol/L 4.3 4.4 4.1   CHLORIDE mmol/L 104 104 105   CO2 mmol/L 26.0 28.0 25.0   BUN mg/dL 14.4 15.6 16.0   CREATININE mg/dL 0.77 0.97 0.86   CALCIUM mg/dL 7.8* 8.0* 8.1*   BILIRUBIN mg/dL 1.2  --  1.3*   ALK PHOS U/L 51  --  57   ALT (SGPT) U/L 7  --  8   AST (SGOT) U/L 24  --  24   GLUCOSE mg/dL 111* 150* 196*     I reviewed the patient's new imaging including images and reports.      Physical therapy  Alice Iqbal, PT   Physical Therapist  Physical Therapy     Plan of Care     Signed     Date of Service: 06/16/25 0908  Creation Time: 06/16/25 1055     Signed         Goal Outcome Evaluation:  Plan of Care Reviewed With: patient  Progress: improving  Outcome Evaluation: Pt ambulated 90 ft w/ FWW, CGA, and navigated 3 steps w/ L handrail use and R HHA, CGA. No LOB or knee buckling noted. Pt would continue to benefit from IP PT services while hospitalized to address deficits. Pt is functionally cleared by PT after completion of stair training. Recommend d/c home w/ assist and OP PT once medically appropriate.     Anticipated Discharge Disposition (PT): home with assist, home with outpatient therapy services                 Discharge Assessment:       Visit Vitals  /55 (BP Location: Right arm, Patient Position: Lying)   Pulse 83   Temp 98.4 °F (36.9 °C) (Oral)   Resp 16   Ht 157.5 cm (62\")   Wt 78.9 kg (174 lb)   LMP  (LMP Unknown)   SpO2 91%   BMI 31.83 kg/m²     No data recorded.      General Appearance:    Alert, cooperative, in no acute distress   Lungs:     Clear to auscultation,respirations regular, even and                   unlabored    Heart:    Regular rhythm and normal " rate, normal S1 and S2   Abdomen:     Normal bowel sounds, no masses, no organomegaly, soft        non-tender, non-distended, no guarding, no rebound                 tenderness   Extremities:   CDI dressing surgical knee . ACB cath present, infupump.   Pulses:   Pulses palpable and equal bilaterally   Skin:   No bleeding, bruising or rash   Neurologic:   Cranial nerves 2 - 12 grossly intact, sensation intact, Flexion and dorsiflexion intact bilateral feet.         Discharge Disposition: Home         Discharge Medications        New Medications        Instructions Start Date   aspirin 81 MG EC tablet   81 mg, Oral, Every 12 Hours Scheduled      cefTRIAXone 2,000 mg in sodium chloride 0.9 % 100 mL IVPB   2,000 mg, Intravenous, Every 24 Hours      DAPTOmycin 500 mg in sodium chloride 0.9 % 50 mL   8 mg/kg (500 mg), Intravenous, Every 24 Hours      oxyCODONE 5 MG immediate release tablet  Commonly known as: ROXICODONE   5 mg, Oral, Every 4 Hours PRN      ropivacaine 0.2 % infusion (INFUSYSTEM)  Commonly known as: NAROPIN   2 mL/hr (4 mg/hr), Peripheral Nerve, Continuous             Continue These Medications        Instructions Start Date   albuterol sulfate  (90 Base) MCG/ACT inhaler  Commonly known as: PROVENTIL HFA;VENTOLIN HFA;PROAIR HFA   Inhale 1 puff Every 4 (Four) Hours As Needed for Wheezing.      ALPRAZolam 0.25 MG tablet  Commonly known as: XANAX       ferrous sulfate 325 (65 FE) MG tablet   325 mg, Daily With Breakfast      furosemide 20 MG tablet  Commonly known as: LASIX       lactulose 20 GM/30ML solution  Commonly known as: CHRONULAC   20 g      levocetirizine 5 MG tablet  Commonly known as: XYZAL   5 mg, As Needed      magnesium gluconate 500 MG tablet  Commonly known as: MAGONATE   500 mg, 2 Times Daily      nadolol 40 MG tablet  Commonly known as: CORGARD   40 mg, Daily      olopatadine 0.1 % ophthalmic solution  Commonly known as: PATANOL   1 drop, As Needed      ondansetron ODT 8 MG  disintegrating tablet  Commonly known as: ZOFRAN-ODT   8 mg, Every 8 Hours PRN      pantoprazole 40 MG EC tablet  Commonly known as: PROTONIX   40 mg, 2 Times Daily      Reglan 5 MG tablet  Generic drug: metoclopramide   5 mg, 2 Times Daily      riFAXIMin 550 MG tablet  Commonly known as: XIFAXAN   550 mg, Oral, 2 Times Daily      rizatriptan 10 MG tablet  Commonly known as: MAXALT   10 mg, Once As Needed      spironolactone 50 MG tablet  Commonly known as: ALDACTONE   50 mg, Daily      traMADol 50 MG tablet  Commonly known as: ULTRAM   50 mg, Every 6 Hours PRN             Stop These Medications      mupirocin 2 % ointment  Commonly known as: BACTROBAN              Discharge Diet: Resume prior    Activity at Discharge:     Protected weight bearing as tolerated left lower extremity, knee range of motion as tolerated     Future Appointments   Date Time Provider Department Center   7/3/2025  1:50 PM Milagros Cuello PA-C [facility]   7/28/2025 10:30 AM Cameron Emerson MD [facility]   8/28/2025  3:15 PM David Bustillos MD [facility]     Per Dr. Ovalle's note(Case management orders: Please arrange for office infusion at [facility] with daptomycin 500 mg IV daily, ceftriaxone 2 g IV daily to continue until 8/1/2025 for left prosthetic knee infection with Staphylococcus. Check CBC, CMP, CRP, CPK weekly while on IV antibiotics. Fax orders to [phone], call [phone] with final arrangements. Arrange for follow-up with me in 1 week postdischarge. Weekly PICC dressing changes. Discussed with Dr. Buck. )    Discharge took over 30 min      Dragon disclaimer:  Part of this encounter note is an electronic transcription/translation of spoken language to printed text. The electronic translation of spoken language may permit erroneous, or at times, nonsensical words or phrases to be inadvertently transcribed; Although I have reviewed the note for such errors, some may still  exist.       Joe Castano MD  06/17/25  07:27 EDT              Electronically signed by Joe Castano MD at 06/17/25 0733       Discharge Order (From admission, onward)       Start     Ordered    06/16/25 1330  Discharge patient  Once        Comments: Dc home after Ceftriaxone dose .   Expected Discharge Date: 06/16/25   Expected Discharge Time: Afternoon   Discharge Disposition: Home or Self Care   Physician of Record for Attribution - Please select from Treatment Team: BRAD LANDAVERDE [[id number]]   Review needed by CMO to determine Physician of Record: No      Question Answer Comment   Physician of Record for Attribution - Please select from Treatment Team BRAD LANDAVERDE    Review needed by CMO to determine Physician of Record No        06/16/25 1339

## 2025-06-17 NOTE — DISCHARGE SUMMARY
Patient Name: Jeevan Cardoso  MRN: [id number]  : 1960  DOS: 2025    Attending: No att. providers found    Primary Care Provider: Carl Mcnamara MD    Date of Admission:.2025  7:27 AM    Date of Discharge:  2025    Discharge Diagnosis:   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, due to infection    Thrombocytopenia    Liver cirrhosis secondary to DARBY    Essential hypertension    GERD without esophagitis    Generalized anxiety disorder    Depression    HTN (hypertension), benign    Arthritis of knee    Infection of prosthetic left knee joint  Status post PICC placement    Consults: Infectious disease, Russ Ovalle MD    Hospital Course    At admit:  Patient is a pleasant 64 y.o. female presented for scheduled surgery by Dr. Buck.     Per his note: (The patient had an uneventful postoperative course after the original left knee arthroplasty and no evidence of wound issues or problems. Early recovery proceeded as expected. Patient was doing well at a year postop. However approximately a month later she developed a wound at the distal lateral aspect of the knee overlying Norma's tubercle. Debridement was performed by primary care followed by dermatologist. She subsequently was seen by me without my knowledge of the prior interventions. Based on the location of the wound relative to the knee joint, there was concern of communication with this wound to the knee joint. A specific clinical evaluation protocol was initiated to evaluate the painful arthroplasty for the possibility of prosthetic joint/implant infection. Acute phase reactant tests were performed and included a CBC, ESR, and CRP. These acute phase reactants were abnormal and elevated, suggestive of an infection. A knee joint aspiration was then performed. The fluid was cloudy/purulent with the cell count and differential abnormally elevated and indicative of an ongoing infection. Cultures from the aspiration were  sent, and revealed staph species. 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 Based on the laboratory evidence and the patient history, a chronic infection was favored. The recommendation was for a complete revision of the left knee arthroplasty and placement of an antibiotic eluting revision total knee arthroplasty to treat the deep infection. The need to remove the entire implant and any necrotic bone and/or tissue significantly infiltrated by the bacteria was also discussed. The need for possible weight bearing restrictions after surgery depending on host bone quality was outlined following the procedure to allow for appropriate wound and tissue healing. The 2-stage re-implantation process and multidisciplinary team approach to treatment was also described with physician teams identified. We discussed that joint reconstruction would only occur if/when there is adequate clinical verification the infection has been eradicated. We then had a long discussion about the extensive risks, benefits, and complications related to the hardware removal; specifically degradation of femoral and tibial bone after cement removal. Other potential risks of the surgical treatment were outlined and included but were not limited to femur fracture, tibia fracture, persistent infection, bone loss, bleeding, anesthesia risks, nerve injury, vessel injury, instability/dislocation, pain, blood clots, possible need for blood transfusion, possible need for further procedures, myocardial infarction, stroke, and death. Understanding of all topics was conveyed to me by the patient pre-operatively, and patient consent was given to proceed with the infected knee arthroplasty removal, radical debridement, placement of an antibiotic eluting revision left total knee arthroplasty. )     Patient underwent complex revision left total knee arthroplasty under general anesthesia, tolerated  surgery well.       Adductor canal nerve block catheter was placed by acute pain service, and patient was admitted for further management.     Seen postoperatively, doing well with good pain control, no complaints of nausea, vomiting, or shortness of breath.  Patient has no history of DVT or PE.     Reviewed with patient past medical history and home medications she has history of Raphael cirrhosis.      After admit:    Patient was provided pain medications as needed for pain control, along with adductor canal nerve block infusion of Ropivacaine.    Adjustments were made to pain medications to optimize postop pain management. Risks and benefits of opiate medications discussed with patient. HECTOR report was reviewed.    She was seen by PT and OT and has progressed well over her stay.    She used an IS for atelectasis prophylaxis and aspirin along with mechanicals for DVT prophylaxis.  Aspirin was held until 6/16/2025 due to low platelets and wound oozing.      She received IV antibiotics under the direction of Dr. Ovalle with [facility].  Cultures remain negative to date.  PICC line was placed in expectation of 6 weeks of IV antibiotics following discharge.  She tolerated antibiotics without side effects.    Home medications were resumed as appropriate, and labs were monitored and remained fairly stable.  Noting patient has baseline thrombocytopenia.    With the progress she has made, patient is ready for DC home today.      She will have an Infupump ( instructed on it during this admit).    Discussed with patient regarding plan and she shows understanding and agreement.    Patient will have PT following discharge.        Procedures Performed  Procedure(s):  TOTAL KNEE ARTHROPLASTY REVISION       Pertinent Test Results:    I reviewed the patient's new clinical results.   Results from last 7 days   Lab Units 06/16/25  0536 06/15/25  1424 06/14/25  0817 06/10/25  0913   WBC 10*3/mm3 3.95  --  8.35 4.35   HEMOGLOBIN g/dL  "10.4* 11.8* 11.5* 13.0   HEMATOCRIT % 32.8* 37.3 34.8 40.3   PLATELETS 10*3/mm3 55*  --  78* 91*     Results from last 7 days   Lab Units 06/16/25  0536 06/15/25  1424 06/14/25  0817   SODIUM mmol/L 137 138 137   POTASSIUM mmol/L 4.3 4.4 4.1   CHLORIDE mmol/L 104 104 105   CO2 mmol/L 26.0 28.0 25.0   BUN mg/dL 14.4 15.6 16.0   CREATININE mg/dL 0.77 0.97 0.86   CALCIUM mg/dL 7.8* 8.0* 8.1*   BILIRUBIN mg/dL 1.2  --  1.3*   ALK PHOS U/L 51  --  57   ALT (SGPT) U/L 7  --  8   AST (SGOT) U/L 24  --  24   GLUCOSE mg/dL 111* 150* 196*     I reviewed the patient's new imaging including images and reports.      Physical therapy  Alice Iqbal, PT   Physical Therapist  Physical Therapy     Plan of Care     Signed     Date of Service: 06/16/25 0908  Creation Time: 06/16/25 1055     Signed         Goal Outcome Evaluation:  Plan of Care Reviewed With: patient  Progress: improving  Outcome Evaluation: Pt ambulated 90 ft w/ FWW, CGA, and navigated 3 steps w/ L handrail use and R HHA, CGA. No LOB or knee buckling noted. Pt would continue to benefit from IP PT services while hospitalized to address deficits. Pt is functionally cleared by PT after completion of stair training. Recommend d/c home w/ assist and OP PT once medically appropriate.     Anticipated Discharge Disposition (PT): home with assist, home with outpatient therapy services                 Discharge Assessment:       Visit Vitals  /55 (BP Location: Right arm, Patient Position: Lying)   Pulse 83   Temp 98.4 °F (36.9 °C) (Oral)   Resp 16   Ht 157.5 cm (62\")   Wt 78.9 kg (174 lb)   LMP  (LMP Unknown)   SpO2 91%   BMI 31.83 kg/m²     No data recorded.      General Appearance:    Alert, cooperative, in no acute distress   Lungs:     Clear to auscultation,respirations regular, even and                   unlabored    Heart:    Regular rhythm and normal rate, normal S1 and S2   Abdomen:     Normal bowel sounds, no masses, no organomegaly, soft        non-tender, " non-distended, no guarding, no rebound                 tenderness   Extremities:   CDI dressing surgical knee . ACB cath present, infupump.   Pulses:   Pulses palpable and equal bilaterally   Skin:   No bleeding, bruising or rash   Neurologic:   Cranial nerves 2 - 12 grossly intact, sensation intact, Flexion and dorsiflexion intact bilateral feet.         Discharge Disposition: Home         Discharge Medications        New Medications        Instructions Start Date   aspirin 81 MG EC tablet   81 mg, Oral, Every 12 Hours Scheduled      cefTRIAXone 2,000 mg in sodium chloride 0.9 % 100 mL IVPB   2,000 mg, Intravenous, Every 24 Hours      DAPTOmycin 500 mg in sodium chloride 0.9 % 50 mL   8 mg/kg (500 mg), Intravenous, Every 24 Hours      oxyCODONE 5 MG immediate release tablet  Commonly known as: ROXICODONE   5 mg, Oral, Every 4 Hours PRN      ropivacaine 0.2 % infusion (INFUSYSTEM)  Commonly known as: NAROPIN   2 mL/hr (4 mg/hr), Peripheral Nerve, Continuous             Continue These Medications        Instructions Start Date   albuterol sulfate  (90 Base) MCG/ACT inhaler  Commonly known as: PROVENTIL HFA;VENTOLIN HFA;PROAIR HFA   Inhale 1 puff Every 4 (Four) Hours As Needed for Wheezing.      ALPRAZolam 0.25 MG tablet  Commonly known as: XANAX       ferrous sulfate 325 (65 FE) MG tablet   325 mg, Daily With Breakfast      furosemide 20 MG tablet  Commonly known as: LASIX       lactulose 20 GM/30ML solution  Commonly known as: CHRONULAC   20 g      levocetirizine 5 MG tablet  Commonly known as: XYZAL   5 mg, As Needed      magnesium gluconate 500 MG tablet  Commonly known as: MAGONATE   500 mg, 2 Times Daily      nadolol 40 MG tablet  Commonly known as: CORGARD   40 mg, Daily      olopatadine 0.1 % ophthalmic solution  Commonly known as: PATANOL   1 drop, As Needed      ondansetron ODT 8 MG disintegrating tablet  Commonly known as: ZOFRAN-ODT   8 mg, Every 8 Hours PRN      pantoprazole 40 MG EC  tablet  Commonly known as: PROTONIX   40 mg, 2 Times Daily      Reglan 5 MG tablet  Generic drug: metoclopramide   5 mg, 2 Times Daily      riFAXIMin 550 MG tablet  Commonly known as: XIFAXAN   550 mg, Oral, 2 Times Daily      rizatriptan 10 MG tablet  Commonly known as: MAXALT   10 mg, Once As Needed      spironolactone 50 MG tablet  Commonly known as: ALDACTONE   50 mg, Daily      traMADol 50 MG tablet  Commonly known as: ULTRAM   50 mg, Every 6 Hours PRN             Stop These Medications      mupirocin 2 % ointment  Commonly known as: BACTROBAN              Discharge Diet: Resume prior    Activity at Discharge:     Protected weight bearing as tolerated left lower extremity, knee range of motion as tolerated     Future Appointments   Date Time Provider Department Center   7/3/2025  1:50 PM Milagros Cuello PA-C [facility]   7/28/2025 10:30 AM Cameron Emerson MD [facility]   8/28/2025  3:15 PM David Bustillos MD [facility]     Per Dr. Ovalle's note(Case management orders: Please arrange for office infusion at [facility] with daptomycin 500 mg IV daily, ceftriaxone 2 g IV daily to continue until 8/1/2025 for left prosthetic knee infection with Staphylococcus. Check CBC, CMP, CRP, CPK weekly while on IV antibiotics. Fax orders to [phone], call [phone] with final arrangements. Arrange for follow-up with me in 1 week postdischarge. Weekly PICC dressing changes. Discussed with Dr. Buck. )    Discharge took over 30 min      Dragon disclaimer:  Part of this encounter note is an electronic transcription/translation of spoken language to printed text. The electronic translation of spoken language may permit erroneous, or at times, nonsensical words or phrases to be inadvertently transcribed; Although I have reviewed the note for such errors, some may still exist.       Joe Castano MD  06/17/25  07:27 EDT

## 2025-06-17 NOTE — OUTREACH NOTE
Prep Survey      Flowsheet Row Responses   [facility]rianism facility patient discharged from? [facility]   Is LACE score < 7 ? No   Eligibility Readm Mgmt   Discharge diagnosis Failed infected left total knee arthroplasty, TOTAL KNEE ARTHROPLASTY REVISION   Does the patient have one of the following disease processes/diagnoses(primary or secondary)? Total Joint Replacement   Does the patient have Home health ordered? No   Is there a DME ordered? No   Comments regarding appointments daily [facility] abx at [facility]   Prep survey completed? Yes            Vicki SANCHES - Registered Nurse

## 2025-06-18 LAB
BACTERIA FLD CULT: NORMAL
GRAM STN SPEC: NORMAL
GRAM STN SPEC: NORMAL

## 2025-06-19 ENCOUNTER — READMISSION MANAGEMENT (OUTPATIENT)
Dept: CALL CENTER | Facility: HOSPITAL | Age: 65
End: 2025-06-19
Payer: MEDICARE

## 2025-06-19 NOTE — OUTREACH NOTE
Total Joint Week 1 Survey      Flowsheet Row Responses   Sabianist facility patient discharged from? [facility]   Does the patient have one of the following disease processes/diagnoses(primary or secondary)? Total Joint Replacement   Joint surgery performed? Knee   Week 1 attempt successful? No   Unsuccessful attempts Attempt 1            Lamar SANCHES - Registered Nurse

## 2025-06-20 ENCOUNTER — TELEPHONE (OUTPATIENT)
Dept: ORTHOPEDIC SURGERY | Facility: CLINIC | Age: 65
End: 2025-06-20
Payer: MEDICARE

## 2025-06-20 ENCOUNTER — READMISSION MANAGEMENT (OUTPATIENT)
Dept: CALL CENTER | Facility: HOSPITAL | Age: 65
End: 2025-06-20
Payer: MEDICARE

## 2025-06-20 LAB
FUNGUS WND CULT: NORMAL
MYCOBACTERIUM SPEC CULT: NORMAL
MYCOBACTERIUM SPEC CULT: NORMAL
NIGHT BLUE STAIN TISS: NORMAL
NIGHT BLUE STAIN TISS: NORMAL

## 2025-06-20 NOTE — TELEPHONE ENCOUNTER
I called and spoke to the patient and advised her that after speaking to Dr. Buck, he is not overly concerned with her incision or the drainage. He advised her to continue the way that she is dressing it currently and to keep a close eye for any profuse bleeding or signs of infection.     Patient verbalized understanding, expressed thanks and had no further questions or concerns.    Jaqueline Diaz MA.

## 2025-06-20 NOTE — OUTREACH NOTE
Total Joint Week 1 Survey      Flowsheet Row Responses   [facility] facility patient discharged from? [place]   Does the patient have one of the following disease processes/diagnoses(primary or secondary)? Total Joint Replacement   Joint surgery performed? Knee   Week 1 attempt successful? No   Unsuccessful attempts Attempt 2            Omer SILVA - Registered Nurse

## 2025-06-23 ENCOUNTER — LAB (OUTPATIENT)
Dept: LAB | Facility: HOSPITAL | Age: 65
End: 2025-06-23
Payer: MEDICARE

## 2025-06-23 ENCOUNTER — TRANSCRIBE ORDERS (OUTPATIENT)
Dept: LAB | Facility: HOSPITAL | Age: 65
End: 2025-06-23
Payer: MEDICARE

## 2025-06-23 DIAGNOSIS — T84.54XD INFECTION OF TOTAL LEFT KNEE REPLACEMENT, SUBSEQUENT ENCOUNTER: ICD-10-CM

## 2025-06-23 DIAGNOSIS — T84.54XD INFECTION OF TOTAL LEFT KNEE REPLACEMENT, SUBSEQUENT ENCOUNTER: Primary | ICD-10-CM

## 2025-06-23 LAB
ALBUMIN SERPL-MCNC: 2.9 G/DL (ref 3.5–5.2)
ALBUMIN/GLOB SERPL: 0.8 G/DL
ALP SERPL-CCNC: 82 U/L (ref 39–117)
ALT SERPL W P-5'-P-CCNC: 8 U/L (ref 1–33)
ANION GAP SERPL CALCULATED.3IONS-SCNC: 9 MMOL/L (ref 5–15)
AST SERPL-CCNC: 26 U/L (ref 1–32)
BACTERIA SPEC ANAEROBE CULT: NORMAL
BASOPHILS # BLD AUTO: 0.03 10*3/MM3 (ref 0–0.2)
BASOPHILS NFR BLD AUTO: 0.7 % (ref 0–1.5)
BILIRUB SERPL-MCNC: 0.8 MG/DL (ref 0–1.2)
BUN SERPL-MCNC: 18.7 MG/DL (ref 8–23)
BUN/CREAT SERPL: 22 (ref 7–25)
CALCIUM SPEC-SCNC: 8.1 MG/DL (ref 8.6–10.5)
CHLORIDE SERPL-SCNC: 101 MMOL/L (ref 98–107)
CK SERPL-CCNC: 38 U/L (ref 20–180)
CO2 SERPL-SCNC: 28 MMOL/L (ref 22–29)
CREAT SERPL-MCNC: 0.85 MG/DL (ref 0.57–1)
CRP SERPL-MCNC: 2.12 MG/DL (ref 0–0.5)
DEPRECATED RDW RBC AUTO: 60.2 FL (ref 37–54)
EGFRCR SERPLBLD CKD-EPI 2021: 77.6 ML/MIN/1.73
EOSINOPHIL # BLD AUTO: 0.18 10*3/MM3 (ref 0–0.4)
EOSINOPHIL NFR BLD AUTO: 4.3 % (ref 0.3–6.2)
ERYTHROCYTE [DISTWIDTH] IN BLOOD BY AUTOMATED COUNT: 16.2 % (ref 12.3–15.4)
ERYTHROCYTE [SEDIMENTATION RATE] IN BLOOD: 29 MM/HR (ref 0–30)
GLOBULIN UR ELPH-MCNC: 3.7 GM/DL
GLUCOSE SERPL-MCNC: 145 MG/DL (ref 65–99)
HCT VFR BLD AUTO: 33.7 % (ref 34–46.6)
HGB BLD-MCNC: 10.9 G/DL (ref 12–15.9)
IMM GRANULOCYTES # BLD AUTO: 0.02 10*3/MM3 (ref 0–0.05)
IMM GRANULOCYTES NFR BLD AUTO: 0.5 % (ref 0–0.5)
LYMPHOCYTES # BLD AUTO: 0.75 10*3/MM3 (ref 0.7–3.1)
LYMPHOCYTES NFR BLD AUTO: 17.7 % (ref 19.6–45.3)
MCH RBC QN AUTO: 32.7 PG (ref 26.6–33)
MCHC RBC AUTO-ENTMCNC: 32.3 G/DL (ref 31.5–35.7)
MCV RBC AUTO: 101.2 FL (ref 79–97)
MONOCYTES # BLD AUTO: 0.58 10*3/MM3 (ref 0.1–0.9)
MONOCYTES NFR BLD AUTO: 13.7 % (ref 5–12)
NEUTROPHILS NFR BLD AUTO: 2.67 10*3/MM3 (ref 1.7–7)
NEUTROPHILS NFR BLD AUTO: 63.1 % (ref 42.7–76)
NRBC BLD AUTO-RTO: 0 /100 WBC (ref 0–0.2)
PLATELET # BLD AUTO: 99 10*3/MM3 (ref 140–450)
PMV BLD AUTO: 10.1 FL (ref 6–12)
POTASSIUM SERPL-SCNC: 4.2 MMOL/L (ref 3.5–5.2)
PROT SERPL-MCNC: 6.6 G/DL (ref 6–8.5)
RBC # BLD AUTO: 3.33 10*6/MM3 (ref 3.77–5.28)
SODIUM SERPL-SCNC: 138 MMOL/L (ref 136–145)
WBC NRBC COR # BLD AUTO: 4.23 10*3/MM3 (ref 3.4–10.8)

## 2025-06-23 PROCEDURE — 86140 C-REACTIVE PROTEIN: CPT

## 2025-06-23 PROCEDURE — 82550 ASSAY OF CK (CPK): CPT

## 2025-06-23 PROCEDURE — 80053 COMPREHEN METABOLIC PANEL: CPT

## 2025-06-23 PROCEDURE — 85025 COMPLETE CBC W/AUTO DIFF WBC: CPT

## 2025-06-23 PROCEDURE — 36415 COLL VENOUS BLD VENIPUNCTURE: CPT

## 2025-06-23 PROCEDURE — 85652 RBC SED RATE AUTOMATED: CPT

## 2025-06-25 ENCOUNTER — READMISSION MANAGEMENT (OUTPATIENT)
Dept: CALL CENTER | Facility: HOSPITAL | Age: 65
End: 2025-06-25
Payer: MEDICARE

## 2025-06-25 NOTE — OUTREACH NOTE
Total Joint Week 1 Survey      Flowsheet Row Responses   [facility]op[facility] facility patient discharged from? [facility]   Does the patient have one of the following disease processes/diagnoses(primary or secondary)? Total Joint Replacement   Joint surgery performed? Knee   Week 1 attempt successful? No   Unsuccessful attempts Attempt 3            Chasity HANNON - Registered Nurse

## 2025-06-30 ENCOUNTER — LAB (OUTPATIENT)
Dept: LAB | Facility: HOSPITAL | Age: 65
End: 2025-06-30
Payer: MEDICARE

## 2025-06-30 ENCOUNTER — TRANSCRIBE ORDERS (OUTPATIENT)
Dept: LAB | Facility: HOSPITAL | Age: 65
End: 2025-06-30
Payer: MEDICARE

## 2025-06-30 DIAGNOSIS — T84.54XD INFECTION AND INFLAMMATORY REACTION DUE TO INTERNAL LEFT KNEE PROSTHESIS, SUBSEQUENT ENCOUNTER: Primary | ICD-10-CM

## 2025-06-30 DIAGNOSIS — T84.54XD INFECTION AND INFLAMMATORY REACTION DUE TO INTERNAL LEFT KNEE PROSTHESIS, SUBSEQUENT ENCOUNTER: ICD-10-CM

## 2025-06-30 LAB
ALBUMIN SERPL-MCNC: 2.9 G/DL (ref 3.5–5.2)
ALBUMIN/GLOB SERPL: 0.7 G/DL
ALP SERPL-CCNC: 114 U/L (ref 39–117)
ALT SERPL W P-5'-P-CCNC: 10 U/L (ref 1–33)
ANION GAP SERPL CALCULATED.3IONS-SCNC: 6 MMOL/L (ref 5–15)
AST SERPL-CCNC: 28 U/L (ref 1–32)
BASOPHILS # BLD AUTO: 0.03 10*3/MM3 (ref 0–0.2)
BASOPHILS NFR BLD AUTO: 0.6 % (ref 0–1.5)
BILIRUB SERPL-MCNC: 1.1 MG/DL (ref 0–1.2)
BUN SERPL-MCNC: 21.5 MG/DL (ref 8–23)
BUN/CREAT SERPL: 23.1 (ref 7–25)
CALCIUM SPEC-SCNC: 8.3 MG/DL (ref 8.6–10.5)
CHLORIDE SERPL-SCNC: 101 MMOL/L (ref 98–107)
CK SERPL-CCNC: 48 U/L (ref 20–180)
CO2 SERPL-SCNC: 28 MMOL/L (ref 22–29)
CREAT SERPL-MCNC: 0.93 MG/DL (ref 0.57–1)
CRP SERPL-MCNC: 1.01 MG/DL (ref 0–0.5)
DEPRECATED RDW RBC AUTO: 59.8 FL (ref 37–54)
EGFRCR SERPLBLD CKD-EPI 2021: 69.6 ML/MIN/1.73
EOSINOPHIL # BLD AUTO: 0.19 10*3/MM3 (ref 0–0.4)
EOSINOPHIL NFR BLD AUTO: 3.8 % (ref 0.3–6.2)
ERYTHROCYTE [DISTWIDTH] IN BLOOD BY AUTOMATED COUNT: 15.6 % (ref 12.3–15.4)
ERYTHROCYTE [SEDIMENTATION RATE] IN BLOOD: 43 MM/HR (ref 0–30)
GLOBULIN UR ELPH-MCNC: 3.9 GM/DL
GLUCOSE SERPL-MCNC: 197 MG/DL (ref 65–99)
HCT VFR BLD AUTO: 35.4 % (ref 34–46.6)
HGB BLD-MCNC: 11.4 G/DL (ref 12–15.9)
IMM GRANULOCYTES # BLD AUTO: 0.02 10*3/MM3 (ref 0–0.05)
IMM GRANULOCYTES NFR BLD AUTO: 0.4 % (ref 0–0.5)
LYMPHOCYTES # BLD AUTO: 0.65 10*3/MM3 (ref 0.7–3.1)
LYMPHOCYTES NFR BLD AUTO: 12.9 % (ref 19.6–45.3)
MCH RBC QN AUTO: 33.6 PG (ref 26.6–33)
MCHC RBC AUTO-ENTMCNC: 32.2 G/DL (ref 31.5–35.7)
MCV RBC AUTO: 104.4 FL (ref 79–97)
MONOCYTES # BLD AUTO: 0.49 10*3/MM3 (ref 0.1–0.9)
MONOCYTES NFR BLD AUTO: 9.8 % (ref 5–12)
NEUTROPHILS NFR BLD AUTO: 3.64 10*3/MM3 (ref 1.7–7)
NEUTROPHILS NFR BLD AUTO: 72.5 % (ref 42.7–76)
NRBC BLD AUTO-RTO: 0 /100 WBC (ref 0–0.2)
PLATELET # BLD AUTO: 124 10*3/MM3 (ref 140–450)
PMV BLD AUTO: 9.5 FL (ref 6–12)
POTASSIUM SERPL-SCNC: 4.7 MMOL/L (ref 3.5–5.2)
PROT SERPL-MCNC: 6.8 G/DL (ref 6–8.5)
RBC # BLD AUTO: 3.39 10*6/MM3 (ref 3.77–5.28)
SODIUM SERPL-SCNC: 135 MMOL/L (ref 136–145)
WBC NRBC COR # BLD AUTO: 5.02 10*3/MM3 (ref 3.4–10.8)

## 2025-06-30 PROCEDURE — 85652 RBC SED RATE AUTOMATED: CPT

## 2025-06-30 PROCEDURE — 85025 COMPLETE CBC W/AUTO DIFF WBC: CPT

## 2025-06-30 PROCEDURE — 86140 C-REACTIVE PROTEIN: CPT

## 2025-06-30 PROCEDURE — 36415 COLL VENOUS BLD VENIPUNCTURE: CPT

## 2025-06-30 PROCEDURE — 82550 ASSAY OF CK (CPK): CPT

## 2025-06-30 PROCEDURE — 80053 COMPREHEN METABOLIC PANEL: CPT

## 2025-07-02 ENCOUNTER — TRANSCRIBE ORDERS (OUTPATIENT)
Dept: LAB | Facility: HOSPITAL | Age: 65
End: 2025-07-02
Payer: MEDICARE

## 2025-07-02 ENCOUNTER — LAB (OUTPATIENT)
Dept: LAB | Facility: HOSPITAL | Age: 65
End: 2025-07-02
Payer: MEDICARE

## 2025-07-02 DIAGNOSIS — R19.7 ACUTE DIARRHEA: ICD-10-CM

## 2025-07-02 DIAGNOSIS — R19.7 ACUTE DIARRHEA: Primary | ICD-10-CM

## 2025-07-02 LAB
ADV 40+41 DNA STL QL NAA+NON-PROBE: NOT DETECTED
ASTRO TYP 1-8 RNA STL QL NAA+NON-PROBE: NOT DETECTED
C CAYETANENSIS DNA STL QL NAA+NON-PROBE: NOT DETECTED
C COLI+JEJ+UPSA DNA STL QL NAA+NON-PROBE: NOT DETECTED
C DIFF TOX GENS STL QL NAA+PROBE: NOT DETECTED
CRYPTOSP DNA STL QL NAA+NON-PROBE: NOT DETECTED
E HISTOLYT DNA STL QL NAA+NON-PROBE: NOT DETECTED
EAEC PAA PLAS AGGR+AATA ST NAA+NON-PRB: NOT DETECTED
EC STX1+STX2 GENES STL QL NAA+NON-PROBE: NOT DETECTED
EPEC EAE GENE STL QL NAA+NON-PROBE: NOT DETECTED
ETEC LTA+ST1A+ST1B TOX ST NAA+NON-PROBE: NOT DETECTED
G LAMBLIA DNA STL QL NAA+NON-PROBE: NOT DETECTED
NOROVIRUS GI+II RNA STL QL NAA+NON-PROBE: NOT DETECTED
P SHIGELLOIDES DNA STL QL NAA+NON-PROBE: NOT DETECTED
RVA RNA STL QL NAA+NON-PROBE: NOT DETECTED
S ENT+BONG DNA STL QL NAA+NON-PROBE: NOT DETECTED
SAPO I+II+IV+V RNA STL QL NAA+NON-PROBE: NOT DETECTED
SHIGELLA SP+EIEC IPAH ST NAA+NON-PROBE: NOT DETECTED
V CHOL+PARA+VUL DNA STL QL NAA+NON-PROBE: NOT DETECTED
V CHOLERAE DNA STL QL NAA+NON-PROBE: NOT DETECTED
Y ENTEROCOL DNA STL QL NAA+NON-PROBE: NOT DETECTED

## 2025-07-02 PROCEDURE — 87493 C DIFF AMPLIFIED PROBE: CPT

## 2025-07-02 PROCEDURE — 87507 IADNA-DNA/RNA PROBE TQ 12-25: CPT

## 2025-07-03 ENCOUNTER — OFFICE VISIT (OUTPATIENT)
Dept: ORTHOPEDIC SURGERY | Facility: CLINIC | Age: 65
End: 2025-07-03
Payer: MEDICARE

## 2025-07-03 VITALS — TEMPERATURE: 97.8 F

## 2025-07-03 DIAGNOSIS — Z96.652 STATUS POST REVISION OF TOTAL KNEE REPLACEMENT, LEFT: Primary | ICD-10-CM

## 2025-07-03 PROCEDURE — 99024 POSTOP FOLLOW-UP VISIT: CPT | Performed by: PHYSICIAN ASSISTANT

## 2025-07-03 PROCEDURE — 1159F MED LIST DOCD IN RCRD: CPT | Performed by: PHYSICIAN ASSISTANT

## 2025-07-03 PROCEDURE — 1160F RVW MEDS BY RX/DR IN RCRD: CPT | Performed by: PHYSICIAN ASSISTANT

## 2025-07-03 RX ORDER — CEFTRIAXONE 2 G/1
INJECTION, POWDER, FOR SOLUTION INTRAMUSCULAR; INTRAVENOUS
COMMUNITY

## 2025-07-03 RX ORDER — DAPTOMYCIN 50 MG/ML
INJECTION, POWDER, LYOPHILIZED, FOR SOLUTION INTRAVENOUS
COMMUNITY

## 2025-07-03 NOTE — PROGRESS NOTES
[facility] [place] Orthopaedic Surgery Clinic Note        Subjective     Post-op Follow-up (3 weeks S/P Total Knee Arthroplasty Revision - Left (DOS: 6/13/25))       FARRAH Cardoso is a 64 y.o. female.  Patient presents for their initial postop visit following complex revision left TKA with removal of implants, irrigation and excisional debridement, revision knee arthroplasty with antibiotic eluding TKA construct performed on the above date by Dr. Buck.    Pain scale: 3/10. Associated symptoms pain, swelling, stiffness. Pain with rising from a seated position. Pain eased by resting. Using walker to assist with ambulation.  Overall she reports her knee feels good.    Patient denies any fever, chills, night sweats or other constitutional symptoms.          Objective      Physical Exam  Temp 97.8 °F (36.6 °C)   LMP  (LMP Unknown)     There is no height or weight on file to calculate BMI.        Ortho Exam  Integument:   Left knee: Both incisions are healing.  The TKA incision is doing quite well and sutures will be removed today.  The lateral incision from previous I&D also healing with questionable area of central aspect of the incision with eschar present.  No active drainage or purulence noted on today's exam.    Lower Extremities:   Left knee:    Tenderness:  Generalized tenderness following TKA.    Effusion:  1+    Swelling: Positive with soft calf    Crepitus:  None    Range of motion:  Extension: 0°       Flexion: 90°  Instability:  No varus laxity, no valgus laxity, negative anterior drawer  Deformities:  None      Imaging Reviewed and Interpreted:  Ordered left knee plain films.  Imaging read/interpreted by Dr. Buck.    Imaging Results (Last 24 Hours)       Procedure Component Value Units Date/Time    XR Knee 3+ View With Alder Left [[id number]] Resulted: 07/03/25 1328     Updated: 07/03/25 1328    Narrative:      Indication: Status post revision left knee arthroplasty    Comparison: Todays xrays were  compared to previous xrays from 6/13/2025      Impression:           Left Knee: Demonstrate well positioned revision total knee arthroplasty   components in satisfactory alignment without evidence of wear, loosening,   subsidence, fracture, or osteolysis and No significant changes compared to   prior radiographs.              Assessment:  1. Status post revision of total knee replacement, left        Plan:  Status post revision left TKA, stable.  Reviewed imaging with patient.  All sutures removed from the TKA incision today.  From lateral knee incision 3 of the 4 sutures were left in place.  Continue with HHPT.  [facility] to continue managing patient's antibiotics.  Recommend OTC pain medication as needed.  Continue with ASA as directed for DVT prophylaxis.  Follow-up next week to have remaining sutures removed.  Questions and concerns answered.    Patient was also examined by Dr. Buck, who agrees with the above assessment and plan.      Answers submitted by the patient for this visit:  Post Operative Visit (Submitted on 7/1/2025)  Chief Complaint: Follow-up  Pain Control: controlled  Fever: 99 - 100 F  Diet: poor intake, lack of appetite  Activity: returning to normal  Operative Site Issues: No  Additional information: Slight spotting      Milagros Cuello PA-C  07/03/25  23:36 EDT      Dictated Utilizing Dragon Dictation.

## 2025-07-07 ENCOUNTER — TRANSCRIBE ORDERS (OUTPATIENT)
Dept: LAB | Facility: HOSPITAL | Age: 65
End: 2025-07-07
Payer: MEDICARE

## 2025-07-07 ENCOUNTER — LAB (OUTPATIENT)
Dept: LAB | Facility: HOSPITAL | Age: 65
End: 2025-07-07
Payer: MEDICARE

## 2025-07-07 DIAGNOSIS — T84.54XD INFECTION OF TOTAL LEFT KNEE REPLACEMENT, SUBSEQUENT ENCOUNTER: Primary | ICD-10-CM

## 2025-07-07 LAB
ALBUMIN SERPL-MCNC: 3 G/DL (ref 3.5–5.2)
ALBUMIN/GLOB SERPL: 0.8 G/DL
ALP SERPL-CCNC: 104 U/L (ref 39–117)
ALT SERPL W P-5'-P-CCNC: 10 U/L (ref 1–33)
ANION GAP SERPL CALCULATED.3IONS-SCNC: 7 MMOL/L (ref 5–15)
AST SERPL-CCNC: 26 U/L (ref 1–32)
BASOPHILS # BLD AUTO: 0.02 10*3/MM3 (ref 0–0.2)
BASOPHILS NFR BLD AUTO: 0.6 % (ref 0–1.5)
BILIRUB SERPL-MCNC: 0.8 MG/DL (ref 0–1.2)
BUN SERPL-MCNC: 18.3 MG/DL (ref 8–23)
BUN/CREAT SERPL: 19.3 (ref 7–25)
CALCIUM SPEC-SCNC: 8.3 MG/DL (ref 8.6–10.5)
CHLORIDE SERPL-SCNC: 102 MMOL/L (ref 98–107)
CK SERPL-CCNC: 38 U/L (ref 20–180)
CO2 SERPL-SCNC: 28 MMOL/L (ref 22–29)
CREAT SERPL-MCNC: 0.95 MG/DL (ref 0.57–1)
CRP SERPL-MCNC: 0.85 MG/DL (ref 0–0.5)
DEPRECATED RDW RBC AUTO: 59.7 FL (ref 37–54)
EGFRCR SERPLBLD CKD-EPI 2021: 67.9 ML/MIN/1.73
EOSINOPHIL # BLD AUTO: 0.1 10*3/MM3 (ref 0–0.4)
EOSINOPHIL NFR BLD AUTO: 3.1 % (ref 0.3–6.2)
ERYTHROCYTE [DISTWIDTH] IN BLOOD BY AUTOMATED COUNT: 15.5 % (ref 12.3–15.4)
ERYTHROCYTE [SEDIMENTATION RATE] IN BLOOD: 34 MM/HR (ref 0–30)
GLOBULIN UR ELPH-MCNC: 3.6 GM/DL
GLUCOSE SERPL-MCNC: 173 MG/DL (ref 65–99)
HCT VFR BLD AUTO: 34.2 % (ref 34–46.6)
HGB BLD-MCNC: 10.8 G/DL (ref 12–15.9)
IMM GRANULOCYTES # BLD AUTO: 0.01 10*3/MM3 (ref 0–0.05)
IMM GRANULOCYTES NFR BLD AUTO: 0.3 % (ref 0–0.5)
LYMPHOCYTES # BLD AUTO: 0.56 10*3/MM3 (ref 0.7–3.1)
LYMPHOCYTES NFR BLD AUTO: 17.1 % (ref 19.6–45.3)
MCH RBC QN AUTO: 32.5 PG (ref 26.6–33)
MCHC RBC AUTO-ENTMCNC: 31.6 G/DL (ref 31.5–35.7)
MCV RBC AUTO: 103 FL (ref 79–97)
MONOCYTES # BLD AUTO: 0.44 10*3/MM3 (ref 0.1–0.9)
MONOCYTES NFR BLD AUTO: 13.5 % (ref 5–12)
NEUTROPHILS NFR BLD AUTO: 2.14 10*3/MM3 (ref 1.7–7)
NEUTROPHILS NFR BLD AUTO: 65.4 % (ref 42.7–76)
NRBC BLD AUTO-RTO: 0 /100 WBC (ref 0–0.2)
PLATELET # BLD AUTO: 76 10*3/MM3 (ref 140–450)
PMV BLD AUTO: 10.2 FL (ref 6–12)
POTASSIUM SERPL-SCNC: 4.2 MMOL/L (ref 3.5–5.2)
PROT SERPL-MCNC: 6.6 G/DL (ref 6–8.5)
RBC # BLD AUTO: 3.32 10*6/MM3 (ref 3.77–5.28)
SODIUM SERPL-SCNC: 137 MMOL/L (ref 136–145)
WBC NRBC COR # BLD AUTO: 3.27 10*3/MM3 (ref 3.4–10.8)

## 2025-07-07 PROCEDURE — 82550 ASSAY OF CK (CPK): CPT

## 2025-07-07 PROCEDURE — 85652 RBC SED RATE AUTOMATED: CPT

## 2025-07-07 PROCEDURE — 86140 C-REACTIVE PROTEIN: CPT

## 2025-07-07 PROCEDURE — 36415 COLL VENOUS BLD VENIPUNCTURE: CPT

## 2025-07-07 PROCEDURE — 80053 COMPREHEN METABOLIC PANEL: CPT

## 2025-07-07 PROCEDURE — 85025 COMPLETE CBC W/AUTO DIFF WBC: CPT

## 2025-07-10 ENCOUNTER — OFFICE VISIT (OUTPATIENT)
Dept: ORTHOPEDIC SURGERY | Facility: CLINIC | Age: 65
End: 2025-07-10
Payer: MEDICARE

## 2025-07-10 DIAGNOSIS — Z96.652 STATUS POST REVISION OF TOTAL KNEE REPLACEMENT, LEFT: Primary | ICD-10-CM

## 2025-07-10 PROCEDURE — 1160F RVW MEDS BY RX/DR IN RCRD: CPT | Performed by: PHYSICIAN ASSISTANT

## 2025-07-10 PROCEDURE — 1159F MED LIST DOCD IN RCRD: CPT | Performed by: PHYSICIAN ASSISTANT

## 2025-07-10 PROCEDURE — 99024 POSTOP FOLLOW-UP VISIT: CPT | Performed by: PHYSICIAN ASSISTANT

## 2025-07-10 NOTE — PROGRESS NOTES
[facility] [place]        Subjective     Post-op (1 week follow up -- 1 month S/P Total Knee Arthroplasty Revision - Left (DOS: 6/13/25))       FARRAH Cardoso is a 64 y.o. female.  Patient was brought back in today for removal of the remaining sutures to the lateral incision from prior I&D.    Pain scale: 3/10.  Associated symptoms pain, stiffness.  She is doing home exercise program.  She does report the pain is a little worse over the last couple of days but states she has been trying to do more.  Currently in wheelchair but uses a walker to assist with ambulation at home.    No reported fever, chills, night sweats or other constitutional symptoms.          Objective      Physical Exam  LMP  (LMP Unknown)     There is no height or weight on file to calculate BMI.        Ortho Exam  Left knee  Surgical incision sites are healing well without redness, warmth or drainage.  Remaining sutures to lateral incision were removed.  Straight leg raise: Intact.  Range of motion: 0-90.      Imaging Reviewed and Interpreted:  No new imaging today.      Assessment:  1. Status post revision of total knee replacement, left        Plan:  Status post revision left TKA, stable.  Remaining sutures were removed.  Continue with HHPT.  [facility] to continue managing patient's antibiotics.  Recommend OTC pain medication as needed.  Follow-up with Dr. Buck in 3 weeks.  To include repeat imaging of left knee.    Questions and concerns answered.    History, exam and imaging discussed with Dr. Buck, who agrees with the above assessment and plan.      Milagros Cuello PA-C  07/11/25  08:38 EDT      Dictated Utilizing Dragon Dictation.

## 2025-07-14 ENCOUNTER — LAB (OUTPATIENT)
Dept: LAB | Facility: HOSPITAL | Age: 65
End: 2025-07-14
Payer: MEDICARE

## 2025-07-14 ENCOUNTER — TRANSCRIBE ORDERS (OUTPATIENT)
Dept: LAB | Facility: HOSPITAL | Age: 65
End: 2025-07-14
Payer: MEDICARE

## 2025-07-14 DIAGNOSIS — T84.54XD INFECTION OF TOTAL LEFT KNEE REPLACEMENT, SUBSEQUENT ENCOUNTER: ICD-10-CM

## 2025-07-14 DIAGNOSIS — T84.54XD INFECTION OF TOTAL LEFT KNEE REPLACEMENT, SUBSEQUENT ENCOUNTER: Primary | ICD-10-CM

## 2025-07-14 LAB
ALBUMIN SERPL-MCNC: 3.1 G/DL (ref 3.5–5.2)
ALBUMIN/GLOB SERPL: 0.8 G/DL
ALP SERPL-CCNC: 105 U/L (ref 39–117)
ALT SERPL W P-5'-P-CCNC: 12 U/L (ref 1–33)
ANION GAP SERPL CALCULATED.3IONS-SCNC: 7 MMOL/L (ref 5–15)
AST SERPL-CCNC: 28 U/L (ref 1–32)
BASOPHILS # BLD AUTO: 0.02 10*3/MM3 (ref 0–0.2)
BASOPHILS NFR BLD AUTO: 0.6 % (ref 0–1.5)
BILIRUB SERPL-MCNC: 1 MG/DL (ref 0–1.2)
BUN SERPL-MCNC: 23.1 MG/DL (ref 8–23)
BUN/CREAT SERPL: 23.6 (ref 7–25)
CALCIUM SPEC-SCNC: 8.8 MG/DL (ref 8.6–10.5)
CHLORIDE SERPL-SCNC: 100 MMOL/L (ref 98–107)
CK SERPL-CCNC: 37 U/L (ref 20–180)
CO2 SERPL-SCNC: 29 MMOL/L (ref 22–29)
CREAT SERPL-MCNC: 0.98 MG/DL (ref 0.57–1)
CRP SERPL-MCNC: 1.29 MG/DL (ref 0–0.5)
DEPRECATED RDW RBC AUTO: 55.7 FL (ref 37–54)
EGFRCR SERPLBLD CKD-EPI 2021: 65.4 ML/MIN/1.73
EOSINOPHIL # BLD AUTO: 0.17 10*3/MM3 (ref 0–0.4)
EOSINOPHIL NFR BLD AUTO: 5.2 % (ref 0.3–6.2)
ERYTHROCYTE [DISTWIDTH] IN BLOOD BY AUTOMATED COUNT: 14.8 % (ref 12.3–15.4)
ERYTHROCYTE [SEDIMENTATION RATE] IN BLOOD: 42 MM/HR (ref 0–30)
GLOBULIN UR ELPH-MCNC: 3.8 GM/DL
GLUCOSE SERPL-MCNC: 120 MG/DL (ref 65–99)
HCT VFR BLD AUTO: 35.9 % (ref 34–46.6)
HGB BLD-MCNC: 11.5 G/DL (ref 12–15.9)
IMM GRANULOCYTES # BLD AUTO: 0.01 10*3/MM3 (ref 0–0.05)
IMM GRANULOCYTES NFR BLD AUTO: 0.3 % (ref 0–0.5)
LYMPHOCYTES # BLD AUTO: 0.65 10*3/MM3 (ref 0.7–3.1)
LYMPHOCYTES NFR BLD AUTO: 19.9 % (ref 19.6–45.3)
MCH RBC QN AUTO: 32.5 PG (ref 26.6–33)
MCHC RBC AUTO-ENTMCNC: 32 G/DL (ref 31.5–35.7)
MCV RBC AUTO: 101.4 FL (ref 79–97)
MONOCYTES # BLD AUTO: 0.47 10*3/MM3 (ref 0.1–0.9)
MONOCYTES NFR BLD AUTO: 14.4 % (ref 5–12)
NEUTROPHILS NFR BLD AUTO: 1.94 10*3/MM3 (ref 1.7–7)
NEUTROPHILS NFR BLD AUTO: 59.6 % (ref 42.7–76)
NRBC BLD AUTO-RTO: 0 /100 WBC (ref 0–0.2)
PLATELET # BLD AUTO: 81 10*3/MM3 (ref 140–450)
PMV BLD AUTO: 10.2 FL (ref 6–12)
POTASSIUM SERPL-SCNC: 5 MMOL/L (ref 3.5–5.2)
PROT SERPL-MCNC: 6.9 G/DL (ref 6–8.5)
RBC # BLD AUTO: 3.54 10*6/MM3 (ref 3.77–5.28)
SODIUM SERPL-SCNC: 136 MMOL/L (ref 136–145)
WBC NRBC COR # BLD AUTO: 3.26 10*3/MM3 (ref 3.4–10.8)

## 2025-07-14 PROCEDURE — 86140 C-REACTIVE PROTEIN: CPT

## 2025-07-14 PROCEDURE — 80053 COMPREHEN METABOLIC PANEL: CPT

## 2025-07-14 PROCEDURE — 36415 COLL VENOUS BLD VENIPUNCTURE: CPT

## 2025-07-14 PROCEDURE — 82550 ASSAY OF CK (CPK): CPT

## 2025-07-14 PROCEDURE — 85652 RBC SED RATE AUTOMATED: CPT

## 2025-07-14 PROCEDURE — 85025 COMPLETE CBC W/AUTO DIFF WBC: CPT

## 2025-07-21 ENCOUNTER — TRANSCRIBE ORDERS (OUTPATIENT)
Dept: LAB | Facility: HOSPITAL | Age: 65
End: 2025-07-21
Payer: MEDICARE

## 2025-07-21 ENCOUNTER — LAB (OUTPATIENT)
Dept: LAB | Facility: HOSPITAL | Age: 65
End: 2025-07-21
Payer: MEDICARE

## 2025-07-21 DIAGNOSIS — T84.54XD INFECTION OF TOTAL LEFT KNEE REPLACEMENT, SUBSEQUENT ENCOUNTER: ICD-10-CM

## 2025-07-21 DIAGNOSIS — T84.54XD INFECTION OF TOTAL LEFT KNEE REPLACEMENT, SUBSEQUENT ENCOUNTER: Primary | ICD-10-CM

## 2025-07-21 LAB
ALBUMIN SERPL-MCNC: 3 G/DL (ref 3.5–5.2)
ALBUMIN/GLOB SERPL: 0.8 G/DL
ALP SERPL-CCNC: 110 U/L (ref 39–117)
ALT SERPL W P-5'-P-CCNC: 14 U/L (ref 1–33)
ANION GAP SERPL CALCULATED.3IONS-SCNC: 6 MMOL/L (ref 5–15)
AST SERPL-CCNC: 33 U/L (ref 1–32)
BASOPHILS # BLD AUTO: 0.01 10*3/MM3 (ref 0–0.2)
BASOPHILS NFR BLD AUTO: 0.3 % (ref 0–1.5)
BILIRUB SERPL-MCNC: 1 MG/DL (ref 0–1.2)
BUN SERPL-MCNC: 20.1 MG/DL (ref 8–23)
BUN/CREAT SERPL: 18.1 (ref 7–25)
CALCIUM SPEC-SCNC: 8.5 MG/DL (ref 8.6–10.5)
CHLORIDE SERPL-SCNC: 98 MMOL/L (ref 98–107)
CK SERPL-CCNC: 37 U/L (ref 20–180)
CO2 SERPL-SCNC: 27 MMOL/L (ref 22–29)
CREAT SERPL-MCNC: 1.11 MG/DL (ref 0.57–1)
CRP SERPL-MCNC: 1.47 MG/DL (ref 0–0.5)
DEPRECATED RDW RBC AUTO: 52.7 FL (ref 37–54)
EGFRCR SERPLBLD CKD-EPI 2021: 56.3 ML/MIN/1.73
EOSINOPHIL # BLD AUTO: 0.11 10*3/MM3 (ref 0–0.4)
EOSINOPHIL NFR BLD AUTO: 3.8 % (ref 0.3–6.2)
ERYTHROCYTE [DISTWIDTH] IN BLOOD BY AUTOMATED COUNT: 14.4 % (ref 12.3–15.4)
ERYTHROCYTE [SEDIMENTATION RATE] IN BLOOD: 25 MM/HR (ref 0–30)
GLOBULIN UR ELPH-MCNC: 3.9 GM/DL
GLUCOSE SERPL-MCNC: 118 MG/DL (ref 65–99)
HCT VFR BLD AUTO: 34.8 % (ref 34–46.6)
HGB BLD-MCNC: 11.6 G/DL (ref 12–15.9)
IMM GRANULOCYTES # BLD AUTO: 0.01 10*3/MM3 (ref 0–0.05)
IMM GRANULOCYTES NFR BLD AUTO: 0.3 % (ref 0–0.5)
LYMPHOCYTES # BLD AUTO: 0.47 10*3/MM3 (ref 0.7–3.1)
LYMPHOCYTES NFR BLD AUTO: 16.1 % (ref 19.6–45.3)
MCH RBC QN AUTO: 33.1 PG (ref 26.6–33)
MCHC RBC AUTO-ENTMCNC: 33.3 G/DL (ref 31.5–35.7)
MCV RBC AUTO: 99.4 FL (ref 79–97)
MONOCYTES # BLD AUTO: 0.49 10*3/MM3 (ref 0.1–0.9)
MONOCYTES NFR BLD AUTO: 16.8 % (ref 5–12)
NEUTROPHILS NFR BLD AUTO: 1.83 10*3/MM3 (ref 1.7–7)
NEUTROPHILS NFR BLD AUTO: 62.7 % (ref 42.7–76)
NRBC BLD AUTO-RTO: 0 /100 WBC (ref 0–0.2)
PLATELET # BLD AUTO: 70 10*3/MM3 (ref 140–450)
PMV BLD AUTO: 10.3 FL (ref 6–12)
POTASSIUM SERPL-SCNC: 4.8 MMOL/L (ref 3.5–5.2)
PROT SERPL-MCNC: 6.9 G/DL (ref 6–8.5)
RBC # BLD AUTO: 3.5 10*6/MM3 (ref 3.77–5.28)
SODIUM SERPL-SCNC: 131 MMOL/L (ref 136–145)
WBC NRBC COR # BLD AUTO: 2.92 10*3/MM3 (ref 3.4–10.8)

## 2025-07-21 PROCEDURE — 85652 RBC SED RATE AUTOMATED: CPT

## 2025-07-21 PROCEDURE — 86140 C-REACTIVE PROTEIN: CPT

## 2025-07-21 PROCEDURE — 36415 COLL VENOUS BLD VENIPUNCTURE: CPT

## 2025-07-21 PROCEDURE — 82550 ASSAY OF CK (CPK): CPT

## 2025-07-21 PROCEDURE — 80053 COMPREHEN METABOLIC PANEL: CPT

## 2025-07-21 PROCEDURE — 85025 COMPLETE CBC W/AUTO DIFF WBC: CPT

## 2025-07-28 ENCOUNTER — TRANSCRIBE ORDERS (OUTPATIENT)
Dept: LAB | Facility: HOSPITAL | Age: 65
End: 2025-07-28
Payer: MEDICARE

## 2025-07-28 ENCOUNTER — LAB (OUTPATIENT)
Dept: LAB | Facility: HOSPITAL | Age: 65
End: 2025-07-28
Payer: MEDICARE

## 2025-07-28 DIAGNOSIS — T84.54XD INFECTION OF TOTAL LEFT KNEE REPLACEMENT, SUBSEQUENT ENCOUNTER: Primary | ICD-10-CM

## 2025-07-28 DIAGNOSIS — T84.54XD INFECTION OF TOTAL LEFT KNEE REPLACEMENT, SUBSEQUENT ENCOUNTER: ICD-10-CM

## 2025-07-28 LAB
ALBUMIN SERPL-MCNC: 2.9 G/DL (ref 3.5–5.2)
ALBUMIN/GLOB SERPL: 0.8 G/DL
ALP SERPL-CCNC: 112 U/L (ref 39–117)
ALT SERPL W P-5'-P-CCNC: 13 U/L (ref 1–33)
ANION GAP SERPL CALCULATED.3IONS-SCNC: 6.3 MMOL/L (ref 5–15)
AST SERPL-CCNC: 33 U/L (ref 1–32)
BASOPHILS # BLD AUTO: 0.01 10*3/MM3 (ref 0–0.2)
BASOPHILS NFR BLD AUTO: 0.4 % (ref 0–1.5)
BILIRUB SERPL-MCNC: 0.8 MG/DL (ref 0–1.2)
BUN SERPL-MCNC: 25.9 MG/DL (ref 8–23)
BUN/CREAT SERPL: 16.4 (ref 7–25)
CALCIUM SPEC-SCNC: 8.6 MG/DL (ref 8.6–10.5)
CHLORIDE SERPL-SCNC: 101 MMOL/L (ref 98–107)
CK SERPL-CCNC: 36 U/L (ref 20–180)
CO2 SERPL-SCNC: 26.7 MMOL/L (ref 22–29)
CREAT SERPL-MCNC: 1.58 MG/DL (ref 0.57–1)
CRP SERPL-MCNC: 1.51 MG/DL (ref 0–0.5)
DEPRECATED RDW RBC AUTO: 53.5 FL (ref 37–54)
EGFRCR SERPLBLD CKD-EPI 2021: 36.9 ML/MIN/1.73
EOSINOPHIL # BLD AUTO: 0.11 10*3/MM3 (ref 0–0.4)
EOSINOPHIL NFR BLD AUTO: 3.9 % (ref 0.3–6.2)
ERYTHROCYTE [DISTWIDTH] IN BLOOD BY AUTOMATED COUNT: 14.4 % (ref 12.3–15.4)
ERYTHROCYTE [SEDIMENTATION RATE] IN BLOOD: 34 MM/HR (ref 0–30)
GLOBULIN UR ELPH-MCNC: 3.8 GM/DL
GLUCOSE SERPL-MCNC: 215 MG/DL (ref 65–99)
HCT VFR BLD AUTO: 34.3 % (ref 34–46.6)
HGB BLD-MCNC: 11 G/DL (ref 12–15.9)
IMM GRANULOCYTES # BLD AUTO: 0.01 10*3/MM3 (ref 0–0.05)
IMM GRANULOCYTES NFR BLD AUTO: 0.4 % (ref 0–0.5)
LYMPHOCYTES # BLD AUTO: 0.45 10*3/MM3 (ref 0.7–3.1)
LYMPHOCYTES NFR BLD AUTO: 15.9 % (ref 19.6–45.3)
MCH RBC QN AUTO: 32.4 PG (ref 26.6–33)
MCHC RBC AUTO-ENTMCNC: 32.1 G/DL (ref 31.5–35.7)
MCV RBC AUTO: 100.9 FL (ref 79–97)
MONOCYTES # BLD AUTO: 0.37 10*3/MM3 (ref 0.1–0.9)
MONOCYTES NFR BLD AUTO: 13.1 % (ref 5–12)
NEUTROPHILS NFR BLD AUTO: 1.88 10*3/MM3 (ref 1.7–7)
NEUTROPHILS NFR BLD AUTO: 66.3 % (ref 42.7–76)
NRBC BLD AUTO-RTO: 0 /100 WBC (ref 0–0.2)
PLATELET # BLD AUTO: 72 10*3/MM3 (ref 140–450)
PMV BLD AUTO: 10.3 FL (ref 6–12)
POTASSIUM SERPL-SCNC: 5.1 MMOL/L (ref 3.5–5.2)
PROT SERPL-MCNC: 6.7 G/DL (ref 6–8.5)
RBC # BLD AUTO: 3.4 10*6/MM3 (ref 3.77–5.28)
SODIUM SERPL-SCNC: 134 MMOL/L (ref 136–145)
WBC NRBC COR # BLD AUTO: 2.83 10*3/MM3 (ref 3.4–10.8)

## 2025-07-28 PROCEDURE — 80053 COMPREHEN METABOLIC PANEL: CPT

## 2025-07-28 PROCEDURE — 82550 ASSAY OF CK (CPK): CPT

## 2025-07-28 PROCEDURE — 85025 COMPLETE CBC W/AUTO DIFF WBC: CPT

## 2025-07-28 PROCEDURE — 86140 C-REACTIVE PROTEIN: CPT

## 2025-07-28 PROCEDURE — 36415 COLL VENOUS BLD VENIPUNCTURE: CPT

## 2025-07-28 PROCEDURE — 85652 RBC SED RATE AUTOMATED: CPT

## 2025-07-30 ENCOUNTER — LAB (OUTPATIENT)
Dept: LAB | Facility: HOSPITAL | Age: 65
End: 2025-07-30
Payer: MEDICARE

## 2025-07-30 ENCOUNTER — TRANSCRIBE ORDERS (OUTPATIENT)
Dept: LAB | Facility: HOSPITAL | Age: 65
End: 2025-07-30
Payer: MEDICARE

## 2025-07-30 DIAGNOSIS — T84.54XD INFECTION AND INFLAMMATORY REACTION DUE TO INTERNAL LEFT KNEE PROSTHESIS, SUBSEQUENT ENCOUNTER: Primary | ICD-10-CM

## 2025-07-30 DIAGNOSIS — T84.54XD INFECTION AND INFLAMMATORY REACTION DUE TO INTERNAL LEFT KNEE PROSTHESIS, SUBSEQUENT ENCOUNTER: ICD-10-CM

## 2025-07-30 LAB
ANION GAP SERPL CALCULATED.3IONS-SCNC: 8.9 MMOL/L (ref 5–15)
BACTERIA UR QL AUTO: ABNORMAL /HPF
BILIRUB UR QL STRIP: NEGATIVE
BUN SERPL-MCNC: 25.3 MG/DL (ref 8–23)
BUN/CREAT SERPL: 15.7 (ref 7–25)
CALCIUM SPEC-SCNC: 8.4 MG/DL (ref 8.6–10.5)
CHLORIDE SERPL-SCNC: 98 MMOL/L (ref 98–107)
CLARITY UR: CLEAR
CO2 SERPL-SCNC: 24.1 MMOL/L (ref 22–29)
COLOR UR: YELLOW
CREAT SERPL-MCNC: 1.61 MG/DL (ref 0.57–1)
EGFRCR SERPLBLD CKD-EPI 2021: 36 ML/MIN/1.73
GLUCOSE SERPL-MCNC: 189 MG/DL (ref 65–99)
GLUCOSE UR STRIP-MCNC: NEGATIVE MG/DL
HGB UR QL STRIP.AUTO: NEGATIVE
HYALINE CASTS UR QL AUTO: ABNORMAL /LPF
KETONES UR QL STRIP: NEGATIVE
LEUKOCYTE ESTERASE UR QL STRIP.AUTO: NEGATIVE
NITRITE UR QL STRIP: NEGATIVE
PH UR STRIP.AUTO: 5.5 [PH] (ref 5–8)
POTASSIUM SERPL-SCNC: 4.5 MMOL/L (ref 3.5–5.2)
PROT UR QL STRIP: NEGATIVE
RBC # UR STRIP: ABNORMAL /HPF
REF LAB TEST METHOD: ABNORMAL
SODIUM SERPL-SCNC: 131 MMOL/L (ref 136–145)
SP GR UR STRIP: 1.01 (ref 1–1.03)
SQUAMOUS #/AREA URNS HPF: ABNORMAL /HPF
UROBILINOGEN UR QL STRIP: NORMAL
WBC # UR STRIP: ABNORMAL /HPF

## 2025-07-30 PROCEDURE — 36415 COLL VENOUS BLD VENIPUNCTURE: CPT

## 2025-07-30 PROCEDURE — 80048 BASIC METABOLIC PNL TOTAL CA: CPT

## 2025-07-30 PROCEDURE — 81001 URINALYSIS AUTO W/SCOPE: CPT | Performed by: INTERNAL MEDICINE

## 2025-07-31 ENCOUNTER — OFFICE VISIT (OUTPATIENT)
Dept: ORTHOPEDIC SURGERY | Facility: CLINIC | Age: 65
End: 2025-07-31
Payer: MEDICARE

## 2025-07-31 ENCOUNTER — TELEPHONE (OUTPATIENT)
Dept: ORTHOPEDIC SURGERY | Facility: CLINIC | Age: 65
End: 2025-07-31

## 2025-07-31 DIAGNOSIS — Z96.652 STATUS POST REVISION OF TOTAL KNEE REPLACEMENT, LEFT: Primary | ICD-10-CM

## 2025-07-31 PROCEDURE — 99024 POSTOP FOLLOW-UP VISIT: CPT | Performed by: ORTHOPAEDIC SURGERY

## 2025-07-31 PROCEDURE — 1159F MED LIST DOCD IN RCRD: CPT | Performed by: ORTHOPAEDIC SURGERY

## 2025-07-31 PROCEDURE — 1160F RVW MEDS BY RX/DR IN RCRD: CPT | Performed by: ORTHOPAEDIC SURGERY

## 2025-07-31 NOTE — TELEPHONE ENCOUNTER
"  Caller: CardosoJeevan \"GREG\"    Relationship: Self    Best call back number: [phone]     What is the best time to reach you: ANYTIME    Who are you requesting to speak with (clinical staff, provider,  specific staff member): CLINICAL    What was the call regarding: PATIENT CALLING BECAUSE SHE FORGOT TO ASK TODAY AT APPOINTMENT, IS IT OKAY FOR THE PATIENT TO SHOWER NOW? SHE HAD A RESTRICTION FOR SPONGE BATHS. BUT WANTS TO KNOW IF SHE CAN SHOWER NOW PLEASE ADVISE, OKAY TO LVM    Is it okay if the provider responds through MyChart: CALL BACK   "
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
## 2025-08-01 NOTE — TELEPHONE ENCOUNTER
Called patient back. Let her know that Dr Buck is fine with her showering at this point as long as wounds are scabbing over. Reminded her that she should not soak the area in a tub, pool, etc. She understood. She will call back with any further problems or questions.     Mary Grace

## 2025-08-18 ENCOUNTER — LAB (OUTPATIENT)
Dept: LAB | Facility: HOSPITAL | Age: 65
End: 2025-08-18
Payer: MEDICARE

## 2025-08-18 ENCOUNTER — OFFICE VISIT (OUTPATIENT)
Dept: GASTROENTEROLOGY | Facility: CLINIC | Age: 65
End: 2025-08-18
Payer: MEDICARE

## 2025-08-18 VITALS
TEMPERATURE: 98.1 F | DIASTOLIC BLOOD PRESSURE: 76 MMHG | WEIGHT: 167 LBS | HEART RATE: 57 BPM | BODY MASS INDEX: 30.73 KG/M2 | OXYGEN SATURATION: 97 % | SYSTOLIC BLOOD PRESSURE: 122 MMHG | HEIGHT: 62 IN

## 2025-08-18 DIAGNOSIS — K75.81 LIVER CIRRHOSIS SECONDARY TO NASH: ICD-10-CM

## 2025-08-18 DIAGNOSIS — K74.60 LIVER CIRRHOSIS SECONDARY TO NASH: Primary | ICD-10-CM

## 2025-08-18 DIAGNOSIS — K75.81 LIVER CIRRHOSIS SECONDARY TO NASH: Primary | ICD-10-CM

## 2025-08-18 DIAGNOSIS — K76.82 HEPATIC ENCEPHALOPATHY: ICD-10-CM

## 2025-08-18 DIAGNOSIS — K74.60 LIVER CIRRHOSIS SECONDARY TO NASH: ICD-10-CM

## 2025-08-18 LAB
INR PPP: 1.37 (ref 0.89–1.12)
PROTHROMBIN TIME: 17.8 SECONDS (ref 12.2–15.3)

## 2025-08-18 PROCEDURE — 80053 COMPREHEN METABOLIC PANEL: CPT

## 2025-08-18 PROCEDURE — 3074F SYST BP LT 130 MM HG: CPT | Performed by: INTERNAL MEDICINE

## 2025-08-18 PROCEDURE — 99214 OFFICE O/P EST MOD 30 MIN: CPT | Performed by: INTERNAL MEDICINE

## 2025-08-18 PROCEDURE — 85610 PROTHROMBIN TIME: CPT

## 2025-08-18 PROCEDURE — 3078F DIAST BP <80 MM HG: CPT | Performed by: INTERNAL MEDICINE

## 2025-08-18 PROCEDURE — G2211 COMPLEX E/M VISIT ADD ON: HCPCS | Performed by: INTERNAL MEDICINE

## 2025-08-18 PROCEDURE — 1160F RVW MEDS BY RX/DR IN RCRD: CPT | Performed by: INTERNAL MEDICINE

## 2025-08-18 PROCEDURE — 85027 COMPLETE CBC AUTOMATED: CPT

## 2025-08-18 PROCEDURE — 1159F MED LIST DOCD IN RCRD: CPT | Performed by: INTERNAL MEDICINE

## 2025-08-19 ENCOUNTER — TELEPHONE (OUTPATIENT)
Dept: ORTHOPEDIC SURGERY | Facility: CLINIC | Age: 65
End: 2025-08-19
Payer: MEDICARE

## 2025-08-19 LAB
ALBUMIN SERPL-MCNC: 3.2 G/DL (ref 3.5–5.2)
ALBUMIN/GLOB SERPL: 0.7 G/DL
ALP SERPL-CCNC: 88 U/L (ref 39–117)
ALT SERPL W P-5'-P-CCNC: 12 U/L (ref 1–33)
ANION GAP SERPL CALCULATED.3IONS-SCNC: 12.4 MMOL/L (ref 5–15)
AST SERPL-CCNC: 29 U/L (ref 1–32)
BILIRUB SERPL-MCNC: 1.5 MG/DL (ref 0–1.2)
BUN SERPL-MCNC: 31 MG/DL (ref 8–23)
BUN/CREAT SERPL: 18.6 (ref 7–25)
CALCIUM SPEC-SCNC: 9.4 MG/DL (ref 8.6–10.5)
CHLORIDE SERPL-SCNC: 97 MMOL/L (ref 98–107)
CO2 SERPL-SCNC: 23.6 MMOL/L (ref 22–29)
CREAT SERPL-MCNC: 1.67 MG/DL (ref 0.57–1)
DEPRECATED RDW RBC AUTO: 43.5 FL (ref 37–54)
EGFRCR SERPLBLD CKD-EPI 2021: 34.5 ML/MIN/1.73
ERYTHROCYTE [DISTWIDTH] IN BLOOD BY AUTOMATED COUNT: 12.5 % (ref 12.3–15.4)
GLOBULIN UR ELPH-MCNC: 4.7 GM/DL
GLUCOSE SERPL-MCNC: 107 MG/DL (ref 65–99)
HCT VFR BLD AUTO: 35.2 % (ref 34–46.6)
HGB BLD-MCNC: 12.1 G/DL (ref 12–15.9)
MCH RBC QN AUTO: 33 PG (ref 26.6–33)
MCHC RBC AUTO-ENTMCNC: 34.4 G/DL (ref 31.5–35.7)
MCV RBC AUTO: 95.9 FL (ref 79–97)
PLATELET # BLD AUTO: 78 10*3/MM3 (ref 140–450)
PMV BLD AUTO: 11 FL (ref 6–12)
POTASSIUM SERPL-SCNC: 4.7 MMOL/L (ref 3.5–5.2)
PROT SERPL-MCNC: 7.9 G/DL (ref 6–8.5)
RBC # BLD AUTO: 3.67 10*6/MM3 (ref 3.77–5.28)
SODIUM SERPL-SCNC: 133 MMOL/L (ref 136–145)
WBC NRBC COR # BLD AUTO: 3.76 10*3/MM3 (ref 3.4–10.8)

## 2025-08-20 ENCOUNTER — TELEPHONE (OUTPATIENT)
Dept: GASTROENTEROLOGY | Facility: CLINIC | Age: 65
End: 2025-08-20
Payer: MEDICARE

## (undated) DEVICE — KT ORCA ORCAPOD DISP STRL

## (undated) DEVICE — PIN HOLD TEMP NOHEAD FLUT 1/8X3.5IN

## (undated) DEVICE — 3 BONE CEMENT MIXER: Brand: MIXEVAC

## (undated) DEVICE — PCH INST SURG INVISISHIELD 2PCKT

## (undated) DEVICE — SPNG LAP PREWSH SFTPK 18X18IN STRL PK/5

## (undated) DEVICE — BLAD SAGITTAL MAKO STD

## (undated) DEVICE — YANKAUER,BULB TIP,W/O VENT,RIGID,STERILE: Brand: MEDLINE

## (undated) DEVICE — TRAP FLD MINIVAC MEGADYNE 100ML

## (undated) DEVICE — PIN BONE MAKO PT 4X110MM SS 1P/U STRL

## (undated) DEVICE — CATH URETH REDRUBBER SMOTH 22F 16IN

## (undated) DEVICE — SUT PDS 2/0 CT2 27IN VIL PDP333H

## (undated) DEVICE — SUT MONOCRYL PLS ANTIB UND 3/0  PS1 27IN

## (undated) DEVICE — BNDG ELAS CO-FLEX SLF ADHR 6IN 5YD LF STRL

## (undated) DEVICE — DRAPE,TOP,102X53,STERILE: Brand: MEDLINE

## (undated) DEVICE — DRSNG WND BORDR/ADHS NONADHR/GZ LF 4X4IN STRL

## (undated) DEVICE — APPL CHLORAPREP TINTED 26ML TEAL

## (undated) DEVICE — SINGLE-USE BIOPSY FORCEPS: Brand: RADIAL JAW 4

## (undated) DEVICE — HYBRID CO2 TUBING/CAP SET FOR OLYMPUS® SCOPES & CO2 SOURCE: Brand: ERBE

## (undated) DEVICE — SPNG GZ WOVN 4X4IN 12PLY 10/BX STRL

## (undated) DEVICE — BLANKT WARM UPPR/BDY ARM/OUT 57X196CM

## (undated) DEVICE — SPNG ENDO BEDSIDE TUB ENZYM

## (undated) DEVICE — CONTAINER,SPECIMEN,OR STERILE,4OZ: Brand: MEDLINE

## (undated) DEVICE — ANTIBACTERIAL UNDYED BRAIDED (POLYGLACTIN 910), SYNTHETIC ABSORBABLE SUTURE: Brand: COATED VICRYL

## (undated) DEVICE — SUT VIC 2/0 CT1 36IN UD VCP945H

## (undated) DEVICE — SWABSTK SKINPREP PVP 2/8IN/SPNG STRL

## (undated) DEVICE — PIN BONE MAKO PT 4X140MM SS 1P/U STRL

## (undated) DEVICE — GLV SURG SENSICARE PI ORTHO SZ8 LF STRL

## (undated) DEVICE — TUBING, SUCTION, 1/4" X 10', STRAIGHT: Brand: MEDLINE

## (undated) DEVICE — CONTN GRAD MEAS TRIANG 32OZ BLK

## (undated) DEVICE — PK KN TOTL 10

## (undated) DEVICE — SYR LUERLOK 50ML

## (undated) DEVICE — PAD GRND E/S MEGADYNE MONOPLR 2/PLT W/CORD A/ DISP

## (undated) DEVICE — SKN PREP SPNG STKS PVP PNT STR: Brand: MEDLINE INDUSTRIES, INC.

## (undated) DEVICE — CUFF TOURNI 1BLDR 2PRT 4X34IN PRP

## (undated) DEVICE — ELECTRD BLD EZ CLN STD 2.5IN

## (undated) DEVICE — SUT ETHLN 3/0 PSLX 30IN 1683H

## (undated) DEVICE — KT PROC KN MAKO VIZADISC TRACK 1P/U STRL

## (undated) DEVICE — Device

## (undated) DEVICE — THE BITE BLOCK MAXI, LATEX FREE STRAP IS USED TO PROTECT THE ENDOSCOPE INSERTION TUBE FROM BEING BITTEN BY THE PATIENT.

## (undated) DEVICE — PENCL ROCKRSWCH MEGADYNE W/HOLSTR 10FT SS

## (undated) DEVICE — SOL IRR H2O BTL 1000ML STRL

## (undated) DEVICE — KT TRAK CHECKPOINT 1FEM/1TIB MAKO SS 1P/U STRL

## (undated) DEVICE — LUBE GEL ENDOGLIDE 1.1OZ

## (undated) DEVICE — INTRO ACCSR BLNT TP

## (undated) DEVICE — PATIENT RETURN ELECTRODE, SINGLE-USE, CONTACT QUALITY MONITORING, ADULT, WITH 9FT CORD, FOR PATIENTS WEIGING OVER 33LBS. (15KG): Brand: MEGADYNE

## (undated) DEVICE — GLV SURG SENSICARE PI MIC PF SZ9 LF STRL

## (undated) DEVICE — BNDG ELAS W/CLIP 6IN 10YD LF STRL

## (undated) DEVICE — PAD,ARMBOARD,CONV,FOAM,2X8X20",12PR/CS: Brand: MEDLINE

## (undated) DEVICE — T-DRAPE,EXTREMITY,STERILE: Brand: MEDLINE

## (undated) DEVICE — PAD CAST SOF ROL STRL 6IN LF

## (undated) DEVICE — UNDERGLV SURG BIOGEL INDICAT PI SZ8 BLU

## (undated) DEVICE — TB SXN FRAZIER 12F STRL

## (undated) DEVICE — SUT VIC 1 CTX 36IN OBGYN VCP977H

## (undated) DEVICE — TP SILK DURAPORE 3IN

## (undated) DEVICE — STOCKINETTE,IMPERVIOUS,12X48,STERILE: Brand: MEDLINE

## (undated) DEVICE — SYR LUERLOK 30CC

## (undated) DEVICE — FIAPC® PROBE W/ FILTER 3000 A OD 2.3MM/6.9FR; L 3M/9.8FT: Brand: ERBE

## (undated) DEVICE — UNDERCAST PADDING: Brand: DEROYAL

## (undated) DEVICE — BOWL UTIL STRL 32OZ

## (undated) DEVICE — ADHS SKIN PREMIERPRO EXOFIN TOPICAL HI/VISC .5ML

## (undated) DEVICE — KT PUMP INFUBLOCK MDL 2100 PMKITSOLIS

## (undated) DEVICE — SYS IRR SURGIPHOR A/MIC RTU BO PVPI 450ML STRL

## (undated) DEVICE — DRSNG SURG AQUACEL AG/ADVNTGE 9X25CM 3.5X10IN

## (undated) DEVICE — GRADUATE CONTN 1000ML

## (undated) DEVICE — BLD SAG SYSTEM6 25X1.27X90MM

## (undated) DEVICE — CVR HNDL LIGHT RIGID

## (undated) DEVICE — SOL HYDROGEN PEROX 3PCT 4OZ

## (undated) DEVICE — GOWN,SIRUS,NONRNF,SETINSLV,XL,20/CS: Brand: MEDLINE

## (undated) DEVICE — DRSNG GZ PETROLTM XEROFORM CURAD 1X8IN STRL

## (undated) DEVICE — PAD ARMBRD SURG CONVOL 7.5X20X2IN

## (undated) DEVICE — 3M™ IOBAN™ 2 ANTIMICROBIAL INCISE DRAPE 6650EZ: Brand: IOBAN™ 2

## (undated) DEVICE — NDL SPINE QUINCKE SHRP/BVL 22G 3.5IN BLK